# Patient Record
Sex: FEMALE | Race: WHITE | NOT HISPANIC OR LATINO | Employment: OTHER | URBAN - NONMETROPOLITAN AREA
[De-identification: names, ages, dates, MRNs, and addresses within clinical notes are randomized per-mention and may not be internally consistent; named-entity substitution may affect disease eponyms.]

---

## 2018-01-22 ENCOUNTER — OFFICE VISIT CONVERTED (OUTPATIENT)
Dept: FAMILY MEDICINE CLINIC | Age: 68
End: 2018-01-22
Attending: NURSE PRACTITIONER

## 2018-02-20 ENCOUNTER — OFFICE VISIT CONVERTED (OUTPATIENT)
Dept: FAMILY MEDICINE CLINIC | Age: 68
End: 2018-02-20
Attending: NURSE PRACTITIONER

## 2018-02-27 ENCOUNTER — OFFICE VISIT CONVERTED (OUTPATIENT)
Dept: FAMILY MEDICINE CLINIC | Age: 68
End: 2018-02-27
Attending: NURSE PRACTITIONER

## 2018-03-20 ENCOUNTER — OFFICE VISIT CONVERTED (OUTPATIENT)
Dept: FAMILY MEDICINE CLINIC | Age: 68
End: 2018-03-20
Attending: NURSE PRACTITIONER

## 2018-06-11 ENCOUNTER — OFFICE VISIT CONVERTED (OUTPATIENT)
Dept: FAMILY MEDICINE CLINIC | Age: 68
End: 2018-06-11
Attending: FAMILY MEDICINE

## 2018-09-12 ENCOUNTER — OFFICE VISIT CONVERTED (OUTPATIENT)
Dept: FAMILY MEDICINE CLINIC | Age: 68
End: 2018-09-12
Attending: NURSE PRACTITIONER

## 2018-10-01 ENCOUNTER — OFFICE VISIT CONVERTED (OUTPATIENT)
Dept: FAMILY MEDICINE CLINIC | Age: 68
End: 2018-10-01
Attending: NURSE PRACTITIONER

## 2018-10-16 ENCOUNTER — CONVERSION ENCOUNTER (OUTPATIENT)
Dept: CARDIOLOGY | Facility: CLINIC | Age: 68
End: 2018-10-16

## 2018-10-16 ENCOUNTER — OFFICE VISIT CONVERTED (OUTPATIENT)
Dept: CARDIOLOGY | Facility: CLINIC | Age: 68
End: 2018-10-16
Attending: INTERNAL MEDICINE

## 2018-11-05 ENCOUNTER — CONVERSION ENCOUNTER (OUTPATIENT)
Dept: CARDIOLOGY | Facility: CLINIC | Age: 68
End: 2018-11-05
Attending: INTERNAL MEDICINE

## 2018-12-11 ENCOUNTER — CONVERSION ENCOUNTER (OUTPATIENT)
Dept: CARDIOLOGY | Facility: CLINIC | Age: 68
End: 2018-12-11

## 2018-12-11 ENCOUNTER — OFFICE VISIT CONVERTED (OUTPATIENT)
Dept: CARDIOLOGY | Facility: CLINIC | Age: 68
End: 2018-12-11
Attending: INTERNAL MEDICINE

## 2019-03-06 ENCOUNTER — HOSPITAL ENCOUNTER (OUTPATIENT)
Dept: OTHER | Facility: HOSPITAL | Age: 69
Discharge: HOME | End: 2019-03-06
Attending: NURSE PRACTITIONER

## 2019-03-06 LAB
APPEARANCE UR: CLEAR
BACTERIA UR QL AUTO: ABNORMAL
BILIRUB UR QL: NEGATIVE
CASTS URNS QL MICRO: ABNORMAL /[LPF]
COLOR UR: YELLOW
CONV LEUKOCYTE ESTERASE: NEGATIVE
CONV UROBILINOGEN IN URINE BY AUTOMATED TEST STRIP: 0.2 {EHRLICHU}/DL (ref 0.1–1)
EPI CELLS #/AREA URNS HPF: ABNORMAL /[HPF]
ERYTHROCYTE [DISTWIDTH] IN BLOOD BY AUTOMATED COUNT: 13.3 % (ref 11.5–14.5)
GLUCOSE 24H UR-MCNC: NEGATIVE MG/DL
HBA1C MFR BLD: 15.5 G/DL (ref 12–16)
HCT VFR BLD AUTO: 47.4 % (ref 37–47)
HGB UR QL STRIP: ABNORMAL
KETONES UR QL STRIP: NEGATIVE MG/DL
MCH RBC QN AUTO: 28.4 PG (ref 27–31)
MCHC RBC AUTO-ENTMCNC: 32.7 G/DL (ref 33–37)
MCV RBC AUTO: 86.8 FL (ref 81–99)
MUCOUS THREADS URNS QL MICRO: ABNORMAL
NITRITE UR-MCNC: NEGATIVE MG/ML
PH UR STRIP.AUTO: 7 [PH] (ref 5–8)
PLATELET # BLD AUTO: 250 10*3/UL (ref 130–400)
PMV BLD AUTO: 10 FL (ref 7.4–10.4)
PROT UR-MCNC: NEGATIVE MG/DL
RBC # BLD AUTO: 5.46 10*6/UL (ref 4.2–5.4)
RBC # BLD AUTO: ABNORMAL /[HPF]
SP GR UR STRIP: 1.02 (ref 1–1.03)
SPECIMEN SOURCE: ABNORMAL
TSH SERPL-ACNC: 2.84 M[IU]/L (ref 0.27–4.2)
UNIDENT CRYS URNS QL MICRO: ABNORMAL /[HPF]
WBC # BLD AUTO: 10.79 10*3/UL (ref 4.8–10.8)
WBC #/AREA URNS HPF: ABNORMAL /[HPF]

## 2019-03-07 LAB
ALBUMIN SERPL-MCNC: 4.1 G/DL (ref 3.5–5)
ALBUMIN/GLOB SERPL: 1.4 {RATIO} (ref 1.4–2.6)
ALP SERPL-CCNC: 97 U/L (ref 43–160)
ALT SERPL-CCNC: 12 U/L (ref 10–40)
ANION GAP SERPL CALC-SCNC: 18 MMOL/L (ref 8–19)
AST SERPL-CCNC: 10 U/L (ref 15–50)
BILIRUB SERPL-MCNC: 0.22 MG/DL (ref 0.2–1.3)
BUN SERPL-MCNC: 14 MG/DL (ref 5–25)
BUN/CREAT SERPL: 19 {RATIO} (ref 6–20)
CALCIUM SERPL-MCNC: 9.6 MG/DL (ref 8.7–10.4)
CHLORIDE SERPL-SCNC: 106 MMOL/L (ref 99–111)
CHOLEST SERPL-MCNC: 179 MG/DL (ref 107–200)
CHOLEST/HDLC SERPL: 4.4 {RATIO} (ref 3–6)
CONV CO2: 26 MMOL/L (ref 22–32)
CONV CREATININE URINE, RANDOM: 89.4 MG/DL (ref 10–300)
CONV MICROALBUM.,U,RANDOM: 37.9 MG/L (ref 0–20)
CONV TOTAL PROTEIN: 7 G/DL (ref 6.3–8.2)
CREAT UR-MCNC: 0.74 MG/DL (ref 0.5–0.9)
EST. AVERAGE GLUCOSE BLD GHB EST-MCNC: 160 MG/DL
GFR SERPLBLD BASED ON 1.73 SQ M-ARVRAT: >60 ML/MIN/{1.73_M2}
GLOBULIN UR ELPH-MCNC: 2.9 G/DL (ref 2–3.5)
GLUCOSE SERPL-MCNC: 127 MG/DL (ref 65–99)
HBA1C MFR BLD: 7.2 % (ref 3.5–5.7)
HDLC SERPL-MCNC: 41 MG/DL (ref 40–60)
LDLC SERPL CALC-MCNC: 101 MG/DL (ref 70–100)
MICROALBUMIN/CREAT UR: 42.4 MG/G{CRE} (ref 0–35)
OSMOLALITY SERPL CALC.SUM OF ELEC: 302 MOSM/KG (ref 273–304)
POTASSIUM SERPL-SCNC: 4.6 MMOL/L (ref 3.5–5.3)
SODIUM SERPL-SCNC: 145 MMOL/L (ref 135–147)
TRIGL SERPL-MCNC: 187 MG/DL (ref 40–150)
VLDLC SERPL-MCNC: 37 MG/DL (ref 5–37)

## 2019-03-08 ENCOUNTER — OFFICE VISIT CONVERTED (OUTPATIENT)
Dept: FAMILY MEDICINE CLINIC | Age: 69
End: 2019-03-08
Attending: NURSE PRACTITIONER

## 2019-04-01 ENCOUNTER — HOSPITAL ENCOUNTER (OUTPATIENT)
Dept: OTHER | Facility: HOSPITAL | Age: 69
Discharge: HOME | End: 2019-04-01
Attending: NURSE PRACTITIONER

## 2019-04-01 ENCOUNTER — OFFICE VISIT CONVERTED (OUTPATIENT)
Dept: FAMILY MEDICINE CLINIC | Age: 69
End: 2019-04-01
Attending: NURSE PRACTITIONER

## 2019-04-01 LAB
APPEARANCE UR: CLEAR
BACTERIA UR QL AUTO: ABNORMAL
BILIRUB UR QL: NEGATIVE
CASTS URNS QL MICRO: ABNORMAL /[LPF]
COLOR UR: YELLOW
CONV LEUKOCYTE ESTERASE: NEGATIVE
CONV UROBILINOGEN IN URINE BY AUTOMATED TEST STRIP: 0.2 {EHRLICHU}/DL (ref 0.1–1)
EPI CELLS #/AREA URNS HPF: ABNORMAL /[HPF]
GLUCOSE 24H UR-MCNC: NEGATIVE MG/DL
HGB UR QL STRIP: ABNORMAL
KETONES UR QL STRIP: NEGATIVE MG/DL
MUCOUS THREADS URNS QL MICRO: ABNORMAL
NITRITE UR-MCNC: NEGATIVE MG/ML
PH UR STRIP.AUTO: 7 [PH] (ref 5–8)
PROT UR-MCNC: NEGATIVE MG/DL
RBC # BLD AUTO: ABNORMAL /[HPF]
SP GR UR STRIP: 1.02 (ref 1–1.03)
SPECIMEN SOURCE: ABNORMAL
UNIDENT CRYS URNS QL MICRO: ABNORMAL /[HPF]
WBC #/AREA URNS HPF: ABNORMAL /[HPF]

## 2019-04-02 ENCOUNTER — OFFICE VISIT CONVERTED (OUTPATIENT)
Dept: CARDIOLOGY | Facility: CLINIC | Age: 69
End: 2019-04-02
Attending: INTERNAL MEDICINE

## 2019-04-03 LAB — BACTERIA UR CULT: NORMAL

## 2019-04-30 ENCOUNTER — OFFICE VISIT CONVERTED (OUTPATIENT)
Dept: FAMILY MEDICINE CLINIC | Age: 69
End: 2019-04-30
Attending: NURSE PRACTITIONER

## 2019-05-29 ENCOUNTER — OFFICE VISIT CONVERTED (OUTPATIENT)
Dept: FAMILY MEDICINE CLINIC | Age: 69
End: 2019-05-29
Attending: NURSE PRACTITIONER

## 2019-10-15 ENCOUNTER — OFFICE VISIT CONVERTED (OUTPATIENT)
Dept: CARDIOLOGY | Facility: CLINIC | Age: 69
End: 2019-10-15
Attending: INTERNAL MEDICINE

## 2020-03-10 ENCOUNTER — HOSPITAL ENCOUNTER (OUTPATIENT)
Dept: OTHER | Facility: HOSPITAL | Age: 70
Discharge: HOME | End: 2020-03-10
Attending: NURSE PRACTITIONER

## 2020-03-10 ENCOUNTER — OFFICE VISIT CONVERTED (OUTPATIENT)
Dept: FAMILY MEDICINE CLINIC | Age: 70
End: 2020-03-10
Attending: NURSE PRACTITIONER

## 2020-03-10 LAB
ALBUMIN SERPL-MCNC: 4 G/DL (ref 3.5–5)
ALBUMIN/GLOB SERPL: 1.4 {RATIO} (ref 1.4–2.6)
ALP SERPL-CCNC: 99 U/L (ref 43–160)
ALT SERPL-CCNC: 15 U/L (ref 10–40)
ANION GAP SERPL CALC-SCNC: 20 MMOL/L (ref 8–19)
APPEARANCE UR: CLEAR
AST SERPL-CCNC: 12 U/L (ref 15–50)
BACTERIA UR QL AUTO: ABNORMAL
BILIRUB SERPL-MCNC: 0.35 MG/DL (ref 0.2–1.3)
BILIRUB UR QL: NEGATIVE
BUN SERPL-MCNC: 13 MG/DL (ref 5–25)
BUN/CREAT SERPL: 18 {RATIO} (ref 6–20)
CALCIUM SERPL-MCNC: 9.5 MG/DL (ref 8.7–10.4)
CASTS URNS QL MICRO: ABNORMAL /[LPF]
CHLORIDE SERPL-SCNC: 97 MMOL/L (ref 99–111)
CHOLEST SERPL-MCNC: 213 MG/DL (ref 107–200)
CHOLEST/HDLC SERPL: 5.5 {RATIO} (ref 3–6)
COLOR UR: YELLOW
CONV CO2: 24 MMOL/L (ref 22–32)
CONV CREATININE URINE, RANDOM: 82 MG/DL (ref 10–300)
CONV LEUKOCYTE ESTERASE: NEGATIVE
CONV MICROALBUM.,U,RANDOM: 17.3 MG/L (ref 0–20)
CONV TOTAL PROTEIN: 6.8 G/DL (ref 6.3–8.2)
CONV UROBILINOGEN IN URINE BY AUTOMATED TEST STRIP: 0.2 {EHRLICHU}/DL (ref 0.1–1)
CREAT UR-MCNC: 0.73 MG/DL (ref 0.5–0.9)
EPI CELLS #/AREA URNS HPF: ABNORMAL /[HPF]
EST. AVERAGE GLUCOSE BLD GHB EST-MCNC: 309 MG/DL
GFR SERPLBLD BASED ON 1.73 SQ M-ARVRAT: >60 ML/MIN/{1.73_M2}
GLOBULIN UR ELPH-MCNC: 2.8 G/DL (ref 2–3.5)
GLUCOSE 24H UR-MCNC: 500 MG/DL
GLUCOSE SERPL-MCNC: 349 MG/DL (ref 65–99)
HBA1C MFR BLD: 12.4 % (ref 3.5–5.7)
HDLC SERPL-MCNC: 39 MG/DL (ref 40–60)
HGB UR QL STRIP: ABNORMAL
KETONES UR QL STRIP: NEGATIVE MG/DL
LDLC SERPL CALC-MCNC: 109 MG/DL (ref 70–100)
MICROALBUMIN/CREAT UR: 21.1 MG/G{CRE} (ref 0–35)
MUCOUS THREADS URNS QL MICRO: ABNORMAL
NITRITE UR-MCNC: NEGATIVE MG/ML
OSMOLALITY SERPL CALC.SUM OF ELEC: 298 MOSM/KG (ref 273–304)
PH UR STRIP.AUTO: 5.5 [PH] (ref 5–8)
POTASSIUM SERPL-SCNC: 3.9 MMOL/L (ref 3.5–5.3)
PROT UR-MCNC: NEGATIVE MG/DL
RBC # BLD AUTO: ABNORMAL /[HPF]
SODIUM SERPL-SCNC: 137 MMOL/L (ref 135–147)
SP GR UR STRIP: 1.02 (ref 1–1.03)
SPECIMEN SOURCE: ABNORMAL
TRIGL SERPL-MCNC: 323 MG/DL (ref 40–150)
UNIDENT CRYS URNS QL MICRO: ABNORMAL /[HPF]
VLDLC SERPL-MCNC: 65 MG/DL (ref 5–37)
WBC #/AREA URNS HPF: ABNORMAL /[HPF]

## 2020-03-24 ENCOUNTER — OFFICE VISIT CONVERTED (OUTPATIENT)
Dept: FAMILY MEDICINE CLINIC | Age: 70
End: 2020-03-24
Attending: NURSE PRACTITIONER

## 2020-04-27 ENCOUNTER — OFFICE VISIT CONVERTED (OUTPATIENT)
Dept: FAMILY MEDICINE CLINIC | Age: 70
End: 2020-04-27
Attending: NURSE PRACTITIONER

## 2020-07-08 ENCOUNTER — HOSPITAL ENCOUNTER (OUTPATIENT)
Dept: OTHER | Facility: HOSPITAL | Age: 70
Discharge: HOME | End: 2020-07-08
Attending: NURSE PRACTITIONER

## 2020-07-08 LAB
ALBUMIN SERPL-MCNC: 3.9 G/DL (ref 3.5–5)
ALBUMIN/GLOB SERPL: 1.4 {RATIO} (ref 1.4–2.6)
ALP SERPL-CCNC: 101 U/L (ref 43–160)
ALT SERPL-CCNC: 15 U/L (ref 10–40)
ANION GAP SERPL CALC-SCNC: 17 MMOL/L (ref 8–19)
APPEARANCE UR: CLEAR
AST SERPL-CCNC: 18 U/L (ref 15–50)
BACTERIA UR QL AUTO: ABNORMAL
BILIRUB SERPL-MCNC: 0.49 MG/DL (ref 0.2–1.3)
BILIRUB UR QL: ABNORMAL
BUN SERPL-MCNC: 17 MG/DL (ref 5–25)
BUN/CREAT SERPL: 21 {RATIO} (ref 6–20)
CALCIUM SERPL-MCNC: 9.2 MG/DL (ref 8.7–10.4)
CASTS URNS QL MICRO: ABNORMAL /[LPF]
CHLORIDE SERPL-SCNC: 98 MMOL/L (ref 99–111)
CHOLEST SERPL-MCNC: 134 MG/DL (ref 107–200)
CHOLEST/HDLC SERPL: 4.1 {RATIO} (ref 3–6)
COLOR UR: YELLOW
CONV CO2: 23 MMOL/L (ref 22–32)
CONV LEUKOCYTE ESTERASE: NEGATIVE
CONV TOTAL PROTEIN: 6.6 G/DL (ref 6.3–8.2)
CONV UROBILINOGEN IN URINE BY AUTOMATED TEST STRIP: 0.2 {EHRLICHU}/DL (ref 0.1–1)
CREAT UR-MCNC: 0.81 MG/DL (ref 0.5–0.9)
EPI CELLS #/AREA URNS HPF: ABNORMAL /[HPF]
EST. AVERAGE GLUCOSE BLD GHB EST-MCNC: 280 MG/DL
GFR SERPLBLD BASED ON 1.73 SQ M-ARVRAT: >60 ML/MIN/{1.73_M2}
GLOBULIN UR ELPH-MCNC: 2.7 G/DL (ref 2–3.5)
GLUCOSE 24H UR-MCNC: 500 MG/DL
GLUCOSE SERPL-MCNC: 284 MG/DL (ref 65–99)
HBA1C MFR BLD: 11.4 % (ref 3.5–5.7)
HDLC SERPL-MCNC: 33 MG/DL (ref 40–60)
HGB UR QL STRIP: ABNORMAL
KETONES UR QL STRIP: NEGATIVE MG/DL
LDLC SERPL CALC-MCNC: 50 MG/DL (ref 70–100)
MUCOUS THREADS URNS QL MICRO: ABNORMAL
NITRITE UR-MCNC: NEGATIVE MG/ML
OSMOLALITY SERPL CALC.SUM OF ELEC: 290 MOSM/KG (ref 273–304)
PH UR STRIP.AUTO: 5.5 [PH] (ref 5–8)
POTASSIUM SERPL-SCNC: 3.8 MMOL/L (ref 3.5–5.3)
PROT UR-MCNC: NEGATIVE MG/DL
RBC # BLD AUTO: ABNORMAL /[HPF]
SODIUM SERPL-SCNC: 134 MMOL/L (ref 135–147)
SP GR UR STRIP: 1.02 (ref 1–1.03)
SPECIMEN SOURCE: ABNORMAL
TRIGL SERPL-MCNC: 257 MG/DL (ref 40–150)
UNIDENT CRYS URNS QL MICRO: ABNORMAL /[HPF]
VLDLC SERPL-MCNC: 51 MG/DL (ref 5–37)
WBC #/AREA URNS HPF: ABNORMAL /[HPF]

## 2020-09-09 ENCOUNTER — OFFICE VISIT CONVERTED (OUTPATIENT)
Dept: CARDIOLOGY | Facility: CLINIC | Age: 70
End: 2020-09-09
Attending: INTERNAL MEDICINE

## 2020-09-30 ENCOUNTER — OFFICE VISIT CONVERTED (OUTPATIENT)
Dept: FAMILY MEDICINE CLINIC | Age: 70
End: 2020-09-30
Attending: NURSE PRACTITIONER

## 2020-09-30 ENCOUNTER — HOSPITAL ENCOUNTER (OUTPATIENT)
Dept: OTHER | Facility: HOSPITAL | Age: 70
Discharge: HOME | End: 2020-09-30
Attending: NURSE PRACTITIONER

## 2020-09-30 LAB
ALBUMIN SERPL-MCNC: 4.3 G/DL (ref 3.5–5)
ALBUMIN/GLOB SERPL: 1.4 {RATIO} (ref 1.4–2.6)
ALP SERPL-CCNC: 104 U/L (ref 43–160)
ALT SERPL-CCNC: 13 U/L (ref 10–40)
ANION GAP SERPL CALC-SCNC: 17 MMOL/L (ref 8–19)
APPEARANCE UR: ABNORMAL
AST SERPL-CCNC: 11 U/L (ref 15–50)
BACTERIA UR QL AUTO: ABNORMAL
BILIRUB SERPL-MCNC: 0.44 MG/DL (ref 0.2–1.3)
BILIRUB UR QL: ABNORMAL
BUN SERPL-MCNC: 17 MG/DL (ref 5–25)
BUN/CREAT SERPL: 20 {RATIO} (ref 6–20)
CALCIUM SERPL-MCNC: 9.6 MG/DL (ref 8.7–10.4)
CASTS URNS QL MICRO: ABNORMAL /[LPF]
CHLORIDE SERPL-SCNC: 95 MMOL/L (ref 99–111)
COLOR UR: ABNORMAL
CONV CO2: 28 MMOL/L (ref 22–32)
CONV LEUKOCYTE ESTERASE: NEGATIVE
CONV TOTAL PROTEIN: 7.3 G/DL (ref 6.3–8.2)
CONV UROBILINOGEN IN URINE BY AUTOMATED TEST STRIP: 0.2 {EHRLICHU}/DL (ref 0.1–1)
CREAT UR-MCNC: 0.83 MG/DL (ref 0.5–0.9)
EPI CELLS #/AREA URNS HPF: ABNORMAL /[HPF]
ERYTHROCYTE [DISTWIDTH] IN BLOOD BY AUTOMATED COUNT: 13.3 % (ref 11.7–14.4)
GFR SERPLBLD BASED ON 1.73 SQ M-ARVRAT: >60 ML/MIN/{1.73_M2}
GLOBULIN UR ELPH-MCNC: 3 G/DL (ref 2–3.5)
GLUCOSE 24H UR-MCNC: NEGATIVE MG/DL
GLUCOSE SERPL-MCNC: 216 MG/DL (ref 65–99)
HCT VFR BLD AUTO: 49.8 % (ref 37–47)
HGB BLD-MCNC: 16.4 G/DL (ref 12–16)
HGB UR QL STRIP: ABNORMAL
KETONES UR QL STRIP: NEGATIVE MG/DL
MCH RBC QN AUTO: 28.3 PG (ref 27–31)
MCHC RBC AUTO-ENTMCNC: 32.9 G/DL (ref 33–37)
MCV RBC AUTO: 86 FL (ref 81–99)
MUCOUS THREADS URNS QL MICRO: ABNORMAL
NITRITE UR-MCNC: NEGATIVE MG/ML
OSMOLALITY SERPL CALC.SUM OF ELEC: 290 MOSM/KG (ref 273–304)
PH UR STRIP.AUTO: 5.5 [PH] (ref 5–8)
PLATELET # BLD AUTO: 228 10*3/UL (ref 130–400)
PMV BLD AUTO: 11.9 FL (ref 9.4–12.3)
POTASSIUM SERPL-SCNC: 3.7 MMOL/L (ref 3.5–5.3)
PROT UR-MCNC: 15 MG/DL
RBC # BLD AUTO: 5.79 10*6/UL (ref 4.2–5.4)
RBC # BLD AUTO: ABNORMAL /[HPF]
SODIUM SERPL-SCNC: 136 MMOL/L (ref 135–147)
SP GR UR STRIP: >=1.03 (ref 1–1.03)
SPECIMEN SOURCE: ABNORMAL
UNIDENT CRYS URNS QL MICRO: ABNORMAL /[HPF]
WBC # BLD AUTO: 12.34 10*3/UL (ref 4.8–10.8)
WBC #/AREA URNS HPF: ABNORMAL /[HPF]

## 2020-10-02 LAB — BACTERIA UR CULT: NORMAL

## 2020-11-03 ENCOUNTER — HOSPITAL ENCOUNTER (OUTPATIENT)
Dept: OTHER | Facility: HOSPITAL | Age: 70
Discharge: HOME | End: 2020-11-03
Attending: NURSE PRACTITIONER

## 2020-12-01 ENCOUNTER — OFFICE VISIT CONVERTED (OUTPATIENT)
Dept: FAMILY MEDICINE CLINIC | Age: 70
End: 2020-12-01
Attending: NURSE PRACTITIONER

## 2020-12-01 ENCOUNTER — HOSPITAL ENCOUNTER (OUTPATIENT)
Dept: OTHER | Facility: HOSPITAL | Age: 70
Discharge: HOME | End: 2020-12-01
Attending: NURSE PRACTITIONER

## 2020-12-01 LAB
ALBUMIN SERPL-MCNC: 4.1 G/DL (ref 3.5–5)
ALBUMIN/GLOB SERPL: 1.3 {RATIO} (ref 1.4–2.6)
ALP SERPL-CCNC: 107 U/L (ref 43–160)
ALT SERPL-CCNC: 10 U/L (ref 10–40)
ANION GAP SERPL CALC-SCNC: 18 MMOL/L (ref 8–19)
APPEARANCE UR: CLEAR
AST SERPL-CCNC: 12 U/L (ref 15–50)
BACTERIA UR QL AUTO: ABNORMAL
BILIRUB SERPL-MCNC: 0.34 MG/DL (ref 0.2–1.3)
BILIRUB UR QL: NEGATIVE
BUN SERPL-MCNC: 19 MG/DL (ref 5–25)
BUN/CREAT SERPL: 23 {RATIO} (ref 6–20)
CALCIUM SERPL-MCNC: 9.8 MG/DL (ref 8.7–10.4)
CASTS URNS QL MICRO: ABNORMAL /[LPF]
CHLORIDE SERPL-SCNC: 98 MMOL/L (ref 99–111)
CHOLEST SERPL-MCNC: 140 MG/DL (ref 107–200)
CHOLEST/HDLC SERPL: 3.5 {RATIO} (ref 3–6)
COLOR UR: YELLOW
CONV CO2: 24 MMOL/L (ref 22–32)
CONV CREATININE URINE, RANDOM: 102.7 MG/DL (ref 10–300)
CONV LEUKOCYTE ESTERASE: NEGATIVE
CONV MICROALBUM.,U,RANDOM: <12 MG/L (ref 0–20)
CONV TOTAL PROTEIN: 7.2 G/DL (ref 6.3–8.2)
CONV UROBILINOGEN IN URINE BY AUTOMATED TEST STRIP: 0.2 {EHRLICHU}/DL (ref 0.1–1)
CREAT UR-MCNC: 0.82 MG/DL (ref 0.5–0.9)
EPI CELLS #/AREA URNS HPF: ABNORMAL /[HPF]
EST. AVERAGE GLUCOSE BLD GHB EST-MCNC: 223 MG/DL
GFR SERPLBLD BASED ON 1.73 SQ M-ARVRAT: >60 ML/MIN/{1.73_M2}
GLOBULIN UR ELPH-MCNC: 3.1 G/DL (ref 2–3.5)
GLUCOSE 24H UR-MCNC: NEGATIVE MG/DL
GLUCOSE SERPL-MCNC: 209 MG/DL (ref 65–99)
HBA1C MFR BLD: 9.4 % (ref 3.5–5.7)
HDLC SERPL-MCNC: 40 MG/DL (ref 40–60)
HGB UR QL STRIP: ABNORMAL
KETONES UR QL STRIP: NEGATIVE MG/DL
LDLC SERPL CALC-MCNC: 67 MG/DL (ref 70–100)
MICROALBUMIN/CREAT UR: 11.7 MG/G{CRE} (ref 0–35)
MUCOUS THREADS URNS QL MICRO: ABNORMAL
NITRITE UR-MCNC: NEGATIVE MG/ML
OSMOLALITY SERPL CALC.SUM OF ELEC: 290 MOSM/KG (ref 273–304)
PH UR STRIP.AUTO: 6 [PH] (ref 5–8)
POTASSIUM SERPL-SCNC: 3.9 MMOL/L (ref 3.5–5.3)
PROT UR-MCNC: NEGATIVE MG/DL
RBC # BLD AUTO: ABNORMAL /[HPF]
SODIUM SERPL-SCNC: 136 MMOL/L (ref 135–147)
SP GR UR STRIP: >=1.03 (ref 1–1.03)
SPECIMEN SOURCE: ABNORMAL
TRIGL SERPL-MCNC: 167 MG/DL (ref 40–150)
UNIDENT CRYS URNS QL MICRO: ABNORMAL /[HPF]
VLDLC SERPL-MCNC: 33 MG/DL (ref 5–37)
WBC #/AREA URNS HPF: ABNORMAL /[HPF]

## 2021-03-12 ENCOUNTER — OFFICE VISIT CONVERTED (OUTPATIENT)
Dept: FAMILY MEDICINE CLINIC | Age: 71
End: 2021-03-12
Attending: NURSE PRACTITIONER

## 2021-03-12 ENCOUNTER — HOSPITAL ENCOUNTER (OUTPATIENT)
Dept: OTHER | Facility: HOSPITAL | Age: 71
Discharge: HOME | End: 2021-03-12
Attending: NURSE PRACTITIONER

## 2021-03-12 LAB
ALBUMIN SERPL-MCNC: 4.2 G/DL (ref 3.5–5)
ALBUMIN/GLOB SERPL: 1.8 {RATIO} (ref 1.4–2.6)
ALP SERPL-CCNC: 88 U/L (ref 43–160)
ALT SERPL-CCNC: 9 U/L (ref 10–40)
ANION GAP SERPL CALC-SCNC: 16 MMOL/L (ref 8–19)
APPEARANCE UR: CLEAR
AST SERPL-CCNC: 11 U/L (ref 15–50)
BACTERIA UR QL AUTO: ABNORMAL
BILIRUB SERPL-MCNC: 0.54 MG/DL (ref 0.2–1.3)
BILIRUB UR QL: NEGATIVE
BUN SERPL-MCNC: 19 MG/DL (ref 5–25)
BUN/CREAT SERPL: 26 {RATIO} (ref 6–20)
CALCIUM SERPL-MCNC: 9.5 MG/DL (ref 8.7–10.4)
CASTS URNS QL MICRO: ABNORMAL /[LPF]
CHLORIDE SERPL-SCNC: 98 MMOL/L (ref 99–111)
CHOLEST SERPL-MCNC: 128 MG/DL (ref 107–200)
CHOLEST/HDLC SERPL: 3.2 {RATIO} (ref 3–6)
COLOR UR: YELLOW
CONV CO2: 27 MMOL/L (ref 22–32)
CONV CREATININE URINE, RANDOM: 145.1 MG/DL (ref 10–300)
CONV LEUKOCYTE ESTERASE: NEGATIVE
CONV MICROALBUM.,U,RANDOM: <12 MG/L (ref 0–20)
CONV TOTAL PROTEIN: 6.6 G/DL (ref 6.3–8.2)
CONV UROBILINOGEN IN URINE BY AUTOMATED TEST STRIP: 0.2 {EHRLICHU}/DL (ref 0.1–1)
CREAT UR-MCNC: 0.73 MG/DL (ref 0.5–0.9)
EPI CELLS #/AREA URNS HPF: ABNORMAL /[HPF]
ERYTHROCYTE [DISTWIDTH] IN BLOOD BY AUTOMATED COUNT: 13.3 % (ref 11.5–14.5)
EST. AVERAGE GLUCOSE BLD GHB EST-MCNC: 166 MG/DL
GFR SERPLBLD BASED ON 1.73 SQ M-ARVRAT: >60 ML/MIN/{1.73_M2}
GLOBULIN UR ELPH-MCNC: 2.4 G/DL (ref 2–3.5)
GLUCOSE 24H UR-MCNC: NEGATIVE MG/DL
GLUCOSE SERPL-MCNC: 149 MG/DL (ref 65–99)
HBA1C MFR BLD: 15.8 G/DL (ref 12–16)
HBA1C MFR BLD: 7.4 % (ref 3.5–5.7)
HCT VFR BLD AUTO: 47.8 % (ref 37–47)
HDLC SERPL-MCNC: 40 MG/DL (ref 40–60)
HGB UR QL STRIP: ABNORMAL
KETONES UR QL STRIP: NEGATIVE MG/DL
LDLC SERPL CALC-MCNC: 53 MG/DL (ref 70–100)
MCH RBC QN AUTO: 28.2 PG (ref 27–31)
MCHC RBC AUTO-ENTMCNC: 33.1 G/DL (ref 33–37)
MCV RBC AUTO: 85.4 FL (ref 81–99)
MICROALBUMIN/CREAT UR: 8.3 MG/G{CRE} (ref 0–35)
MUCOUS THREADS URNS QL MICRO: ABNORMAL
NITRITE UR-MCNC: NEGATIVE MG/ML
OSMOLALITY SERPL CALC.SUM OF ELEC: 289 MOSM/KG (ref 273–304)
PH UR STRIP.AUTO: 5.5 [PH] (ref 5–8)
PLATELET # BLD AUTO: 276 10*3/UL (ref 130–400)
PMV BLD AUTO: 9.8 FL (ref 7.4–10.4)
POTASSIUM SERPL-SCNC: 3.9 MMOL/L (ref 3.5–5.3)
PROT UR-MCNC: NEGATIVE MG/DL
RBC # BLD AUTO: 5.6 10*6/UL (ref 4.2–5.4)
RBC # BLD AUTO: ABNORMAL /[HPF]
SODIUM SERPL-SCNC: 137 MMOL/L (ref 135–147)
SP GR UR STRIP: 1.02 (ref 1–1.03)
SPECIMEN SOURCE: ABNORMAL
TRIGL SERPL-MCNC: 174 MG/DL (ref 40–150)
TSH SERPL-ACNC: 2.82 M[IU]/L (ref 0.27–4.2)
UNIDENT CRYS URNS QL MICRO: ABNORMAL /[HPF]
VLDLC SERPL-MCNC: 35 MG/DL (ref 5–37)
WBC # BLD AUTO: 12.95 10*3/UL (ref 4.8–10.8)
WBC #/AREA URNS HPF: ABNORMAL /[HPF]

## 2021-04-23 ENCOUNTER — OFFICE VISIT CONVERTED (OUTPATIENT)
Dept: FAMILY MEDICINE CLINIC | Age: 71
End: 2021-04-23
Attending: NURSE PRACTITIONER

## 2021-05-13 NOTE — PROGRESS NOTES
Progress Note      Patient Name: Zaria Howard   Patient ID: [id number]   Sex: Female   YOB: 1950    Primary Care Provider: Alex BERNAL   Referring Provider: Alex BERNAL    Visit Date: September 9, 2020    Provider: Jose Elias Calderon MD   Location: [facility]   Location Address: [address] [place] [address]  [address] [place] [address]  [id number]   Location Phone: [phone]          Chief Complaint  · Follow-up visit for chest pain and hypertension       History Of Present Illness  REFERRING CARE PROVIDER: Alex BERNAL   Zaria Howard is a 70-year-old female with hypertension, who was previously seen and evaluated for chest pain. Subsequent SPECT study showed a small apical infarct, makenna-infarct ischemia. Cardiac catheterization was recommended, but patient opted for medical management. Isosorbide Mononitrate and Metoprolol were added. Today the patient reports feeling fine. She denies having any chest pain episodes in the past one year. She denies having any shortness of breath, palpitations or dizziness. She was diagnosed with diabetes mellitus recently and was started on Metformin. Thyroid hormone supplementation was also started.   PAST MEDICAL HISTORY: 1) Hypertension; 2) Chest pain with a stress test showing small apical ischemia, opted for medical management; 3) Diabetes mellitus, on oral agents; 4) Hypothyroidism.   PSYCHOSOCIAL HISTORY: No history of mood changes or depression. She never used alcohol. She smokes 1 pack of cigarettes per day.   CURRENT MEDICATIONS: include ASA 81 mg daily; Metoprolol Succinate 25 mg daily; Isosorbide 30 mg daily; HCTZ 25 mg daily; Metformin 500 mg b.i.d.; Xyzal p.r.n.. The dosage and frequency of the medications were reviewed with the patient.       Review of Systems  · Cardiovascular  o Admits  o : swelling (feet, ankles, hands)  o Denies  o : palpitations (fast, fluttering, or skipping beats), shortness of breath  "while walking or lying flat, chest pain or angina pectoris   · Respiratory  o Denies  o : chronic or frequent cough, asthma or wheezing      Vitals  Date Time BP Position Site L\R Cuff Size HR RR TEMP (F) WT  HT  BMI kg/m2 BSA m2 O2 Sat HC       09/09/2020 09:29 /89 Sitting    85 - R   159lbs 0oz 5'  3\" 28.17 1.79           Physical Examination  · Respiratory  o Auscultation of Lungs  o : Clear to auscultation bilaterally. No crackles or rhonchi.  · Cardiovascular  o Heart  o : S1, S2 is normally heard. No S3. No murmur, rubs, or gallops.  · Gastrointestinal  o Abdominal Examination  o : Soft, nontender, nondistended. No free fluid. Bowel sounds heard in all four quadrants.  · Extremities  o Extremities  o : Warm and well perfused. No pitting pedal edema. Distal pulses present.     Labs done on 07/08/2020 show sodium 134, potassium 3.8, bicarb 23, BUN 17, creatinine 0.81.  Hemoglobin A1c is 11.4.  Calcium 9.2, bilirubin 0.49.  AST 18, ALT 15.  Alkaline phosphatase is 101.  Total protein 6.6, albumin 3.9, globulin 2.7.  , , LDL 50, HDL 33.           Assessment     ASSESSMENT AND PLAN:    1.  Hypertension:  Blood pressure very well controlled.  Continue Metoprolol and HCTZ at the current dose.  2.  Chest pain:  Currently asymptomatic and no chest pain spells for the past one year.  Left ventricular function       is preserved.  No further cardiac workup is needed at this point.  Continue Isosorbide Mononitrate and       aspirin at the current dose.  3.  Follow up in 9 months.  Patient was advised to call our office earlier for any worsening symptoms, especially       chest tightness or pain.    MD KIKE Zuniga/chente           This note was transcribed by Payton Mosquera.  chente/KIKE  The above service was transcribed by Pyaton Mosquera, and I attest to the accuracy of the note.  JV               Electronically Signed by: Payton Mosquera-, -Author on September 14, 2020 08:14:28 " AM  Electronically Co-signed by: Jose Elias Calderon MD -Reviewer on September 14, 2020 08:15:13 AM

## 2021-05-14 VITALS
SYSTOLIC BLOOD PRESSURE: 137 MMHG | DIASTOLIC BLOOD PRESSURE: 89 MMHG | HEART RATE: 85 BPM | WEIGHT: 159 LBS | HEIGHT: 63 IN | BODY MASS INDEX: 28.17 KG/M2

## 2021-05-15 VITALS
HEART RATE: 73 BPM | DIASTOLIC BLOOD PRESSURE: 63 MMHG | BODY MASS INDEX: 30.65 KG/M2 | HEIGHT: 63 IN | SYSTOLIC BLOOD PRESSURE: 147 MMHG | WEIGHT: 173 LBS

## 2021-05-15 VITALS
SYSTOLIC BLOOD PRESSURE: 143 MMHG | BODY MASS INDEX: 31.89 KG/M2 | DIASTOLIC BLOOD PRESSURE: 67 MMHG | HEIGHT: 63 IN | HEART RATE: 69 BPM | WEIGHT: 180 LBS

## 2021-05-15 VITALS
DIASTOLIC BLOOD PRESSURE: 71 MMHG | HEIGHT: 63 IN | WEIGHT: 180 LBS | SYSTOLIC BLOOD PRESSURE: 153 MMHG | BODY MASS INDEX: 31.89 KG/M2 | HEART RATE: 86 BPM

## 2021-05-16 VITALS
WEIGHT: 173 LBS | DIASTOLIC BLOOD PRESSURE: 60 MMHG | SYSTOLIC BLOOD PRESSURE: 146 MMHG | HEIGHT: 63 IN | HEART RATE: 66 BPM | BODY MASS INDEX: 30.65 KG/M2

## 2021-05-18 NOTE — PROGRESS NOTES
Zaria HowardBecca 1950     Office/Outpatient Visit    Visit Date: Tue, Feb 27, 2018 08:44 am    Provider: Alex Girard N.P. (Assistant: Kajal Hoover MA)    Location: [facility]        Electronically signed by Alex Girard N.P. on  02/27/2018 05:33:37 PM                             SUBJECTIVE:        CC:     DEANNE is a 67 year old White female.  DISCUSS LABS;         HPI:         Patient presents with type 2 diabetes.  It was first diagnosed 1 week ago.  This diagnosis was made during an office visit with another health care provider.  Treatment started at that time includes an 1800 calorie ADA diet.  Diabetes education was initiated and has included handouts and oral instruction from a doctor (regarding a definition of the disease, diabetic diet, treatments, and glycosolated hemoglobin measurement ).  Initial lab results include Estimated Avg Glucose:  154 (mg/dL) (02/20/2018), Hemoglobin A1c:  7.0 (%) (02/20/2018).  She denies experiencing any diabetes related symptoms.  Pertinent medical history includes obesity.      ROS:     CONSTITUTIONAL:  Negative for chills, fatigue, fever, and weight change.      CARDIOVASCULAR:  Negative for chest pain, orthopnea, paroxysmal nocturnal dyspnea and pedal edema.      RESPIRATORY:  Positive for current smoker, gets patches filled on Friday and is determined to quit smoking.   Negative for dyspnea.      GASTROINTESTINAL:  Negative for abdominal pain, constipation, diarrhea, nausea and vomiting.      ENDOCRINE:  Positive for New dx of DM, A1C 7.0, declines meds, wants to try 3mo of diet.      PSYCHIATRIC:  Positive for improved with trazodone.   Negative for anxiety or depression.          PMH/FMH/SH:     Last Reviewed on 2/27/2018 11:20 AM by Alex Girard    Past Medical History:             PREVENTIVE HEALTH MAINTENANCE             BONE DENSITY: has never been done will think about it in the future     COLORECTAL CANCER SCREENING:  declines colorectal cancer screening, understands reason for testing; never     MAMMOGRAM: declines, understands reason for testing has never been done     PAP SMEAR: was last done S/P OFE with BSO 2016 No longer indicated due to age and history         PAST MEDICAL HISTORY             ADVANCE DIRECTIVE Living will and Durable Power of  [facility]         Family History:     Father: Cerebrovascular Accident ( death in his fifties )     Mother: ;  Myocardial Infarction ( 70's )     Son(s): at [facility], is to be placed in nursing home         Social History:     Occupation:    . Retired     Marital Status:      Children: 3 children (ages 3 living 1 passed away )         Tobacco/Alcohol/Supplements:     Last Reviewed on 2018 11:20 AM by Alex Girard    Tobacco: Current Smoker: She currently smokes every day, 1/2 pack per day; (start age 31, smoked 2-3 ppd for many years pack-year history).          Alcohol:  Does not drink alcohol and never has.      Caffeine:  She admits to consuming caffeine via coffee ( 2 servings per day ).          Substance Abuse History:     Last Reviewed on 2018 11:20 AM by Alex Girard        Mental Health History:     Last Reviewed on 2018 11:20 AM by Alex Girard        Communicable Diseases (eg STDs):     Last Reviewed on 2018 11:20 AM by Alex Girard            Current Problems:     Last Reviewed on 2018 11:20 AM by Alex Girard    Type 2 diabetes     Dysthymic disorder     Hypertension     Cigarette smoking     Screening for depression     Insomnia     ETD     R otitis media     Pneumonia, NEC     Acute bronchitis         Immunizations:     None        Allergies:     Last Reviewed on 2018 11:20 AM by Alex Girard    Sudafed:        Current Medications:     Last Reviewed on 2018 11:20 AM by Alex Girard    [facility]     Albuterol  90mcg/1actuation Oral Inhaler 2 puff  QID  PRN     Fluticasone Propionate 50mcg/1actuation Nasal Spray 1 spray each nostil daily     Nicotine 14mg Transdermal Patch Apply 1 patch to hairless area on upper arm once daily, change every 24 hours for 6 weeks.     Nicotine 21mg Transdermal Patch Apply 1 patch(es) to hairless area on upper arms daily Remove after 24 hours for 6 weeks     Nicotine 7mg Transdermal Patch apply 1 patch to hairless area on under arms daily. Remove after 24 hours for 2 weeks.     Zyrtec 10mg Tablet Take 1 tablet(s) by mouth daily         OBJECTIVE:        Vitals:         Current: 2/27/2018 8:46:11 AM    Ht:  5 ft, 2.75 in;  Wt: 183.8 lbs;  BMI: 32.8    T: 98.5 F (oral);  BP: 145/71 mm Hg (left arm, sitting);  P: 81 bpm (left arm (BP Cuff), sitting);  sCr: 0.73 mg/dL;  GFR: 81.13        Repeat:     8:49:49 AM     BP:   150/82mm Hg (right arm, sitting)         Exams:     PHYSICAL EXAM:     GENERAL: vital signs recorded - well developed, well nourished;  no apparent distress;     E/N/T:  normal EACs, TMs, nasal/oral mucosa, teeth, gingiva, and oropharynx;     RESPIRATORY: normal respiratory rate and pattern with no distress; normal breath sounds with no rales, rhonchi, wheezes or rubs;     CARDIOVASCULAR: normal rate; rhythm is regular;  no systolic murmur; no edema;     PSYCHIATRIC:  appropriate affect and demeanor; normal speech pattern; grossly normal memory;         ASSESSMENT:           250.00   E11.9  Type 2 diabetes              DDx:     305.1   F17.200  Cigarette smoking              DDx:     780.52   G47.00  Insomnia              DDx:         ORDERS:         Other Orders:       4004F  Pt scrnd tobacco use rcvd tobacco cessation talk  (In-House)                   PLAN:          Type 2 diabetes         FOLLOW-UP: Schedule a follow-up visit in 3 months..  Nutrition Handouts Given: Portion control/Size matters, Dietary Guidelines, Healthy Snacks, Breakfast foods, Label reading, Healthy Fats,      FOLLOW-UP: Schedule a follow-up visit in 3 months.            Prescriptions: declines any meds at present dmt           Patient Education Handouts:       Diabetes (Foot and Skin Problems)        Non-Insulin Dependent Diabetes Mellitus (NIDDM)           Cigarette smoking         RECOMMENDATIONS given include: Counseled on smoking cessation and advised of the benefits to patient's health if she were to stop smoking. Counseling for less than 3 minutes and starting patches this week!!!.            Orders:       4004F  Pt scrnd tobacco use rcvd tobacco cessation talk  (In-House)            Insomnia           Prescriptions: Continue Trazodone             Patient Recommendations:        For  Type 2 diabetes:     Schedule a follow-up visit in 3 months.                APPOINTMENT INFORMATION:        Monday Tuesday Wednesday Thursday Friday Saturday Sunday            Time:___________________AM  PM   Date:_____________________ Schedule a follow-up visit in 3 months.          For  Cigarette smoking:         Stop smoking.              CHARGE CAPTURE:           Primary Diagnosis:     250.00 Type 2 diabetes            E11.9    Type 2 diabetes mellitus without complications              Orders:          02026   Office/outpatient visit; established patient, level 4  (In-House)           305.1 Cigarette smoking            F17.200    Nicotine dependence, unspecified, uncomplicated              Orders:          4004F   Pt scrnd tobacco use rcvd tobacco cessation talk  (In-House)           780.52 Insomnia            G47.00    Insomnia, unspecified

## 2021-05-18 NOTE — PROGRESS NOTES
"Zaria Howard  1950     Office/Outpatient Visit    Visit Date: Wed, Sep 30, 2020 09:26 am    Provider: Alex Girard N.P. (Assistant: Lakesha Cruz, )    Location: [facility]        Electronically signed by Alex Girard N.P. on  09/30/2020 09:52:52 AM                             Subjective:        CC: Noris is a 70 year old White female.  \"thinking im trying to pass a kidney stone\" DOXIMITY [phone];         HPI:       Telehealth visit for left flank pain x 4 days, some nausea no vomiting , no fever, has previous hx of kidney stones x15 different times, last episode was a few years ago . No fever, able to keep water down. Taking MOtrin without control of pain . No diarrhea, or constipation.    ROS:     CONSTITUTIONAL:  Negative for fatigue and fever.      CARDIOVASCULAR:  Negative for chest pain, palpitations, tachycardia, orthopnea, and edema.      RESPIRATORY:  Negative for recent cough, dyspnea and frequent wheezing.      GASTROINTESTINAL:  Positive for nausea and left flank pain around to left kidney area.   Negative for constipation, diarrhea or vomiting.      PSYCHIATRIC:  Negative for anxiety, depression, and sleep disturbances.          Past Medical History / Family History / Social History:         Last Reviewed on 9/30/2020 09:50 AM by Alex Girard    Past Medical History:             PREVENTIVE HEALTH MAINTENANCE             BONE DENSITY: has never been done will think about it in the future     COLORECTAL CANCER SCREENING: declines colorectal cancer screening, understands reason for testing; never     EYE EXAM: Diabetic Eye Exam during this calendar year and results are in chart was last done 8/2019     MAMMOGRAM: declines, understands reason for testing has never been done     PAP SMEAR: was last done S/P FOE with BSO 12/28/2016 No longer indicated due to age and history         PAST MEDICAL HISTORY             ADVANCE DIRECTIVE Living will " and Durable Power of  [facility]             PAST MEDICAL HISTORY         ECHO- 2019-ECHO , borderline hypertrophy, mild mitral regurgitation, possible mitral valve prolapse.          Surgical History:         Hysterectomy: ; and bladder repair;         Family History:     Father: Cerebrovascular Accident ( death in his fifties )     Mother: ;  Myocardial Infarction ( 70's )     Son(s): at [facility], is to be placed in nursing home         Social History:     Occupation:    . Retired     Marital Status:      Children: 3 children (ages 3 living 1 passed away )         Tobacco/Alcohol/Supplements:     Last Reviewed on 2020 09:50 AM by Alex Girard    Tobacco: Current Smoker: She currently smokes every day, 1-1/2 packs per day.          Alcohol:  Does not drink alcohol and never has.      Caffeine:  She admits to consuming caffeine via coffee ( 2 servings per day ).          Mental Health History:     Last Reviewed on 2020 09:50 AM by Alex Girard        Current Problems:     Last Reviewed on 2020 09:50 AM by Alex Girard    Nicotine dependence, unspecified, uncomplicated    Dysthymic disorder    Other persistent mood [affective] disorders    Essential (primary) hypertension    Insomnia, unspecified    Type 2 diabetes mellitus without complications    Personal history of nicotine dependence    Overweight    Chronic obstructive pulmonary disease, unspecified    Encounter for general adult medical examination without abnormal findings    Encounter for screening for depression    ST elevation (STEMI) myocardial infarction involving other sites    Intercostal pain        Immunizations:     None        Allergies:     Last Reviewed on 2020 09:50 AM by Alex Girard    metFORMIN:   (Adverse Reaction)    Sudafed:          Current Medications:     Last Reviewed on 2020 09:50 AM by Alex Girard    hydroCHLOROthiazide 25 mg oral  tablet [1 tab daily]    Xyzal 5 mg oral tablet [Take 1 tablet(s) by mouth each evening]    aspirin 81 mg oral tablet,chewable [1 a day]    nitroglycerin 0.4 mg Sublingual Tablet, Sublingual [AS DIRECTED PRN]    metoprolol succinate 25 mg oral Tablet, Extended Release 24 hr [1T PO QD]    isosorbide mononitrate 30 mg oral Tablet, Extended Release 24 hr [1 tab daily]    Anoro Ellipta 62.5-25 mcg/actuation Inhalation Blister, With Inhalation Device [Take 1 inhalation(s) by mouth daily]    meclizine 12.5 mg oral tablet [TAKE 2 TABLETS  BY ORAL ROUTE 1 HOUR BEFORE EXPOSURE TO MOTION]    Ventolin HFA 90 mcg/actuation Inhalation HFA Aerosol Inhaler [1 puff inhaled every 4-6 hours prn SOB/wheezing or insurance covered]    atorvastatin 10 mg oral tablet [take 1 tablet (10 mg) by oral route once daily]    glipiZIDE 2.5 mg oral Tablet, Extended Release 24 hr [TAKE 1 TABLET BY MOUTH EVERY DAY WITH BREAKFAST]    blood-glucose meter kit  [use to check blood sugar bid for E11.9]    lancets  [use to check blood sugar bid for E11.9]    blood sugar diagnostic strips  [use to check blood sugar bid for E11.9, ]    Januvia 25 mg oral tablet [take 1 tablet (25 mg) by oral route once daily]        Objective:        Exams:     PHYSICAL EXAM:     GENERAL: seems to be in moderate pain;     NEUROLOGIC: GROSSLY INTACT     PSYCHIATRIC: appropriate affect and demeanor; normal psychomotor function; normal speech pattern;         Assessment:         R07.82   Intercostal pain           ORDERS:         Meds Prescribed:       [New Rx] tamsulosin 0.4 mg oral capsule [take 1 capsule (0.4 mg) by oral route once daily], #14 (fourteen) capsules, Refills: 0 (zero)       [New Rx] Norco 7.5-325 mg oral tablet [take 1 tablet by mouth  bid prn pain not improved with Motrin], #15 (fifteen) tablets, Refills: 0 (zero)       [New Rx] Zofran 4 mg oral tablet [1 tablet every 6 hrs prn nausea], #30 (thirty) tablets, Refills: 0 (zero)         Lab Orders:       45045   BDCB2 - [facility] CBC w/o diff  (Send-Out)            32446  [facility] Comp. Metabolic Panel  (Send-Out)            13271  BDUA - [facility] Urinalysis, automated, with micro  (Send-Out)              Other Orders:       09512  CT Renal Stone Protocol (CT abdomen and pelvis w/o IV contrast) no oral prep  (Send-Out; Stat)                      Plan:         Intercostal painTake prn pain meds, if not improving will need to rto or go to ER. antonio call with CT results. dmt    LABORATORY:  Labs ordered to be performed today include CBC W/O DIFF, Comprehensive metabolic panel, and urinalysis with micro.      RADIOLOGY:  I have ordered CT Renal Stone protocol abdomen and pelvis w/o contrast; no oral prep to be done today.  Telehealth: Verbal consent obtained for visit to occur via phone call; Staff, other than provider, present during telephone visit include Zaria Howard , pt Juan Jose Girard APRN; Total time spent was 12 minutes; 41561--Jehbtqqbj E/M 11-20 minutes           Prescriptions:       [New Rx] tamsulosin 0.4 mg oral capsule [take 1 capsule (0.4 mg) by oral route once daily], #14 (fourteen) capsules, Refills: 0 (zero)       [New Rx] Norco 7.5-325 mg oral tablet [take 1 tablet by mouth  bid prn pain not improved with Motrin], #15 (fifteen) tablets, Refills: 0 (zero)       [New Rx] Zofran 4 mg oral tablet [1 tablet every 6 hrs prn nausea], #30 (thirty) tablets, Refills: 0 (zero)           Orders:       [id number]  CB2 - [facility] CBC w/o diff  (Send-Out)            66329  [facility] Comp. Metabolic Panel  (Send-Out)            49017  BDUA - [facility] Urinalysis, automated, with micro  (Send-Out)            03007  CT Renal Stone Protocol (CT abdomen and pelvis w/o IV contrast) no oral prep  (Send-Out; Stat)                  Charge Capture:         Primary Diagnosis:     R07.82  Intercostal pain           Orders:      [id number]  Phys/QHP telephone evaluation 11-20 minutes  (In-House)

## 2021-05-18 NOTE — PROGRESS NOTES
Zaria Howard  1950     Office/Outpatient Visit    Visit Date: Tue, Mar 24, 2020 04:37 pm    Provider: Alex Girard N.P. (Assistant: Ruth Ann Campa, )    Location: [facility]        Electronically signed by Alex Girard N.P. on  03/24/2020 04:54:56 PM                             Subjective:        CC: TELEHEALTH visit to discuss labs. DM    HPI:       TELEHEALTH visit to discuss recent labs 3/10/2020. States she has never taken DM meds and that despite many test , she does not really have DM. Recent A1c 12.4 discussed this with pt that her BG was over 300 at time of OV and she is at high risk related to elevated sugar and elevated cholesterol on OV for either a Heart attack (again) or a Stroke. Patient will try metformin     ROS:     CONSTITUTIONAL:  Negative for fatigue and fever.      CARDIOVASCULAR:  Negative for chest pain, palpitations, tachycardia, orthopnea, and edema.      RESPIRATORY:  Positive for intermittent SOB , wants inhaler , current smoker , hx COPD.   Negative for recent cough, dyspnea or frequent wheezing.      GASTROINTESTINAL:  Negative for abdominal pain, constipation, diarrhea, nausea and vomiting.      GENITOURINARY:  Negative for dysuria and hematuria.      ENDOCRINE:  Positive for Hx DM, but has declined all meds in the past , stated she was not a Diabetic.      PSYCHIATRIC:  Negative for anxiety, depression, and sleep disturbances.          Past Medical History / Family History / Social History:         Last Reviewed on 3/24/2020 04:47 PM by Alex Girard    Past Medical History:             PREVENTIVE HEALTH MAINTENANCE             BONE DENSITY: has never been done will think about it in the future     COLORECTAL CANCER SCREENING: declines colorectal cancer screening, understands reason for testing; never     EYE EXAM: Diabetic Eye Exam during this calendar year and results are in chart was last done 8/2019     MAMMOGRAM: declines,  understands reason for testing has never been done     PAP SMEAR: was last done S/P OFE with BSO 2016 No longer indicated due to age and history         PAST MEDICAL HISTORY             ADVANCE DIRECTIVE Living will and Durable Power of  [facility]             PAST MEDICAL HISTORY         ECHO- 2019-ECHO , borderline hypertrophy, mild mitral regurgitation, possible mitral valve prolapse.          Surgical History:         Hysterectomy: ; and bladder repair;         Family History:     Father: Cerebrovascular Accident ( death in his fifties )     Mother: ;  Myocardial Infarction ( 70's )     Son(s): at [facility], is to be placed in nursing home         Social History:     Occupation:    . Retired     Marital Status:      Children: 3 children (ages 3 living 1 passed away )         Tobacco/Alcohol/Supplements:     Last Reviewed on 3/24/2020 04:47 PM by Alex Girard    Tobacco: Current Smoker: She currently smokes every day, 1-5 cigarettes per day.          Alcohol:  Does not drink alcohol and never has.      Caffeine:  She admits to consuming caffeine via coffee ( 2 servings per day ).          Substance Abuse History:     Last Reviewed on 3/24/2020 04:47 PM by Alex Girard        Mental Health History:     Last Reviewed on 3/24/2020 04:47 PM by Alex Girard        Communicable Diseases (eg STDs):     Last Reviewed on 3/24/2020 04:47 PM by Alex Girard        Current Problems:     Last Reviewed on 3/24/2020 04:47 PM by Alex Girard    Nicotine dependence, unspecified, uncomplicated    Dysthymic disorder    Other persistent mood [affective] disorders    Essential (primary) hypertension    Insomnia, unspecified    Type 2 diabetes mellitus without complications    Personal history of nicotine dependence    Overweight    Chronic obstructive pulmonary disease, unspecified    Encounter for general adult medical examination without abnormal  findings    Encounter for screening for depression    ST elevation (STEMI) myocardial infarction involving other sites        Immunizations:     None        Allergies:     Last Reviewed on 3/24/2020 04:47 PM by Alex Girardd:          Current Medications:     Last Reviewed on 3/24/2020 04:47 PM by Alex Girard    hydroCHLOROthiazide 25 mg oral tablet [1 tab daily]    Xyzal 5 mg oral tablet [Take 1 tablet(s) by mouth each evening]    aspirin 81 mg oral tablet,chewable [1 a day]    Nitroglycerin 0.4 mg Sublingual Tablet, Sublingual [AS DIRECTED PRN]    metoprolol succinate 25 mg oral Tablet, Extended Release 24 hr [TAKE 1 TABLET BY MOUTH EVERY DAY]    Isosorbide Mononitrate 30 mg oral Tablet, Extended Release 24 hr [1 tab daily]    Anoro Ellipta 62.5mcg/25mcg per 1blister Inhalation Powder [Take 1 inhalation(s) by mouth daily]    meclizine 12.5 mg oral tablet [take 2 tablets (25 mg) by oral route 1 hour before exposure to motion]        Objective:        Lab/Test Results:         Estimated Avg Glucose: 309 (mg/dL) (03/10/2020),     Hemoglobin A1c: 12.4 (%) (03/10/2020),     Total Cholesterol: 213 (mg/dL) (03/10/2020),     HDL: 39 (mg/dL) (03/10/2020),     Triglycerides: 323 (mg/dL) (03/10/2020),     LDL: 109 (mg/dL) (03/10/2020),             Assessment:         E11.9   Type 2 diabetes mellitus without complications           ORDERS:         Meds Prescribed:       [New Rx] metFORMIN 750 mg oral Tablet, Extended Release 24 hr [take 1 tablet (750 mg) by oral route once daily ], #90 (ninety) tablets, Refills: 3 (three)       [New Rx] atorvastatin 10 mg oral tablet [take 1 tablet (10 mg) by oral route once daily], #90 (ninety) tablets, Refills: 3 (three)       [New Rx] Ventolin HFA 90 mcg/actuation Inhalation HFA Aerosol Inhaler [1 puff inhaled every 4-6 hours prn SOB/wheezing or insurance covered], #3 (three) blisters, Refills: 4 (four)                 Plan:         Type 2 diabetes mellitus without  complicationsRepeat A1C , CMP , ua and Lipid in 3mo. dmt    Telehealth: Verbal consent obtained for visit to occur via phone call; Staff, other than provider, present during telephone visit include DOLORES Chaidez, cecilio Ayala, and her daughter; Total time spent was 9 minutes; [id number]--Ecgvtdivi E/M 5-10 minutes           Prescriptions:       [New Rx] metFORMIN 750 mg oral Tablet, Extended Release 24 hr [take 1 tablet (750 mg) by oral route once daily ], #90 (ninety) tablets, Refills: 3 (three)       [New Rx] atorvastatin 10 mg oral tablet [take 1 tablet (10 mg) by oral route once daily], #90 (ninety) tablets, Refills: 3 (three)       [New Rx] Ventolin HFA 90 mcg/actuation Inhalation HFA Aerosol Inhaler [1 puff inhaled every 4-6 hours prn SOB/wheezing or insurance covered], #3 (three) blisters, Refills: 4 (four)             Charge Capture:         Primary Diagnosis:     E11.9  Type 2 diabetes mellitus without complications           Orders:      [id number]  Phys/QHP telephone evaluation 5-10 min  (In-House)

## 2021-05-18 NOTE — PROGRESS NOTES
Zaria Howard RE 1950     Office/Outpatient Visit    Visit Date: Tue, Apr 30, 2019 09:26 am    Provider: Alex Girard N.P. (Assistant: Bari Fuentes)    Location: [facility]        Electronically signed by Alex Girard N.P. on  04/30/2019 11:11:53 AM                             SUBJECTIVE:        CC:     Noris is a 68 year old White female.  sinuses and ears bothering her,went to urgent care for this last week, needs tick removed;         HPI:         Patient complains of uRI.  These have been present for the past one week.  The symptoms include ear pain and congestion,  nasal congestion, sinus pain/pressure and clear sputum production.  She has already tried to relieve the symptoms with was on Amoxicillin until 4/29/19 and recieved a pain shot from urgent care.  Medical history is significant for L.ear infection and busted TM.  Now the patients R. ear is bothering her as well.     ROS:     CONSTITUTIONAL:  Negative for chills, fatigue, fever, and weight change.      E/N/T:  Positive for ear pain ( bilateral ), diminished hearing ( bilaterally; the L.ear is worse, but the R. ear feels congested ) and sinus pressure.   Negative for sore throat.      CARDIOVASCULAR:  Negative for chest pain, orthopnea, paroxysmal nocturnal dyspnea and pedal edema.      RESPIRATORY:  Negative for dyspnea.      NEUROLOGICAL:  Negative for dizziness, headaches, paresthesias, and weakness.      PSYCHIATRIC:  Negative for anxiety, depression, and sleep disturbances.          PMH/FMH/SH:     Last Reviewed on 4/30/2019 09:40 AM by Alex Girard    Past Medical History:             PREVENTIVE HEALTH MAINTENANCE             BONE DENSITY: has never been done will think about it in the future     COLORECTAL CANCER SCREENING: declines colorectal cancer screening, understands reason for testing; never     EYE EXAM: Diabetic Eye Exam during this calendar year and results are in chart was last done 6/2018      MAMMOGRAM: declines, understands reason for testing has never been done     PAP SMEAR: was last done S/P OFE with BSO 2016 No longer indicated due to age and history         PAST MEDICAL HISTORY             ADVANCE DIRECTIVE Living will and Durable Power of  [facility]         Surgical History:         Hysterectomy: ; and bladder repair;         Family History:     Father: Cerebrovascular Accident ( death in his fifties )     Mother: ;  Myocardial Infarction ( 70's )     Son(s): at [facility], is to be placed in nursing home         Social History:     Occupation:    . Retired     Marital Status:      Children: 3 children (ages 3 living 1 passed away )         Tobacco/Alcohol/Supplements:     Last Reviewed on 2019 09:40 AM by Alex Girard    Tobacco: Current Smoker: She currently smokes every day, 1-5 cigarettes per day.          Alcohol:  Does not drink alcohol and never has.      Caffeine:  She admits to consuming caffeine via coffee ( 2 servings per day ).              Current Problems:     Last Reviewed on 3/08/2019 10:57 AM by Alex Girard    Microscopic hematuria     Overweight     History of tobacco abuse     Type 2 diabetes     Dysthymic disorder     Hypertension     Allergies     Screening for depression     Insomnia         Immunizations:     None        Allergies:     Last Reviewed on 2019 09:40 AM by Alex Girard    Sudafed:        Current Medications:     Last Reviewed on 2019 09:40 AM by Alex Girard    Metoprolol Succinate 25mg Tablets, Extended Release 1 tab PO daily     Aspirin (ASA) 81mg Chewable Tablet 1 a day     Nitroglycerin 0.4mg Tablets, Sublingual AS DIRECTED PRN     Xyzal Allergy 24hr 5mg Tablet Take 1 tablet(s) by mouth each evening         OBJECTIVE:        Vitals:         Current: 2019 9:32:52 AM    Ht:  5 ft, 2.75 in;  Wt: 180.2 lbs;  BMI: 32.2    T: 97.2 F (oral);  BP: 164/79 mm Hg (right arm,  sitting);  P: 66 bpm (right arm (BP Cuff), sitting);  sCr: 0.74 mg/dL;  GFR: 78.33        Repeat:     9:34:00 AM     BP:   163/88mm Hg (right arm, sitting)         Exams:     PHYSICAL EXAM:     GENERAL: vital signs recorded - well developed, well nourished;  no apparent distress;     E/N/T: EARS: external auditory canal erythematous bilaterally;  the left TM is perforated and dried blood aroun TM and the right TM is has fluid behind it;  OROPHARYNX: posterior pharynx, including tonsils, tongue, and uvula are normal;     NECK: carotid exam reveals no bruits;     RESPIRATORY: normal respiratory rate and pattern with no distress; normal breath sounds with no rales, rhonchi, wheezes or rubs;     CARDIOVASCULAR: normal rate; rhythm is regular;  no systolic murmur; no edema;     NEUROLOGIC: GROSSLY INTACT     PSYCHIATRIC:  appropriate affect and demeanor; normal speech pattern; grossly normal memory;         ASSESSMENT:           382.01   H66.012   H65.01  Acute suppurative otitis media with TM perforation              DDx:         ORDERS:         Meds Prescribed:       Doxycycline Monohydrate 100mg Capsules Take 1 capsule(s) by mouth bid x10 days  #20 (Twenty) capsule(s) Refills: 0       Hydrocortisone/Neomycin/Polymyxin B  1%/0.35%/10,000U Otic Suspension 2 GTTS BID IN AFFECTED EAR  #10 (Ten) ml Refills: 0                 PLAN:          Acute suppurative otitis media with TM perforation         RECOMMENDATIONS given include: to keep ears dry during treatment.      FOLLOW-UP: Advised to call if there is no improvement in 3-4 day(s).   Schedule follow-up appointments on a p.r.n. basis.            Prescriptions:       Doxycycline Monohydrate 100mg Capsules Take 1 capsule(s) by mouth bid x10 days  #20 (Twenty) capsule(s) Refills: 0       Hydrocortisone/Neomycin/Polymyxin B  1%/0.35%/10,000U Otic Suspension 2 GTTS BID IN AFFECTED EAR  #10 (Ten) ml Refills: 0             Patient Recommendations:        For  Acute suppurative  otitis media with TM perforation:     Schedule follow-up appointments as needed.              CHARGE CAPTURE:           Primary Diagnosis:     382.01 Acute suppurative otitis media with TM perforation            H66.012    Acute suppurative otitis media with spontaneous rupture of ear drum, left ear           H65.01    Acute serous otitis media, right ear              Orders:          [id number]   Office/outpatient visit; established patient, level 3  (In-House)

## 2021-05-18 NOTE — PROGRESS NOTES
Zaria Howard  1950     Office/Outpatient Visit    Visit Date: Mon, Apr 27, 2020 08:34 am    Provider: Alex Girard N.P. (Assistant: Stefany Tomas MA)    Location: [facility]        Electronically signed by Alex Girard N.P. on  04/27/2020 09:19:22 AM                             Subjective:        CC: Noris is a 69 year old White female.  This is a follow-up visit.  med refill; [phone] // [employer]        HPI:       Telehealth video visit for follow up on DM. States she has been taking the Metformin and doing well , no nausea, vomiting, diarrhea or any side effects. Does feel gibbons faster but has not noticed a lot of weight loss over past one month. Taking all other meds as directed.     ROS:     CONSTITUTIONAL:  Negative for fatigue and fever.      CARDIOVASCULAR:  Negative for chest pain, palpitations, tachycardia, orthopnea, and edema.      RESPIRATORY:  Negative for recent cough, dyspnea and frequent wheezing.      GASTROINTESTINAL:  Negative for abdominal pain, constipation, diarrhea, nausea and vomiting.      NEUROLOGICAL:  Negative for dizziness, memory loss, paresthesias, tremor and weakness.      PSYCHIATRIC:  Negative for anxiety, depression, and sleep disturbances.          Past Medical History / Family History / Social History:         Last Reviewed on 4/27/2020 09:03 AM by Alex Girard    Past Medical History:             PREVENTIVE HEALTH MAINTENANCE             BONE DENSITY: has never been done will think about it in the future     COLORECTAL CANCER SCREENING: declines colorectal cancer screening, understands reason for testing; never     EYE EXAM: Diabetic Eye Exam during this calendar year and results are in chart was last done 8/2019     MAMMOGRAM: declines, understands reason for testing has never been done     PAP SMEAR: was last done S/P OFE with BSO 12/28/2016 No longer indicated due to age and history         PAST MEDICAL HISTORY              ADVANCE DIRECTIVE Living will and Durable Power of  [facility]             PAST MEDICAL HISTORY         ECHO- 2019-ECHO , borderline hypertrophy, mild mitral regurgitation, possible mitral valve prolapse.          Surgical History:         Hysterectomy: ; and bladder repair;         Family History:     Father: Cerebrovascular Accident ( death in his fifties )     Mother: ;  Myocardial Infarction ( 70's )     Son(s): at [facility], is to be placed in nursing home         Social History:     Occupation:    . Retired     Marital Status:      Children: 3 children (ages 3 living 1 passed away )         Tobacco/Alcohol/Supplements:     Last Reviewed on 2020 09:03 AM by Alex Girard    Tobacco: Current Smoker: She currently smokes every day, 1-5 cigarettes per day.          Alcohol:  Does not drink alcohol and never has.      Caffeine:  She admits to consuming caffeine via coffee ( 2 servings per day ).          Mental Health History:     Last Reviewed on 2020 09:03 AM by Alex Girard        Current Problems:     Last Reviewed on 2020 09:03 AM by Alex Girard    Nicotine dependence, unspecified, uncomplicated    Dysthymic disorder    Other persistent mood [affective] disorders    Essential (primary) hypertension    Insomnia, unspecified    Type 2 diabetes mellitus without complications    Personal history of nicotine dependence    Overweight    Chronic obstructive pulmonary disease, unspecified    Encounter for general adult medical examination without abnormal findings    Encounter for screening for depression    ST elevation (STEMI) myocardial infarction involving other sites        Immunizations:     None        Allergies:     Last Reviewed on 2020 09:03 AM by Alex Girard    Sudafed:          Current Medications:     Last Reviewed on 2020 09:03 AM by Alex Girard    hydroCHLOROthiazide 25 mg oral tablet [1 tab daily]     Xyzal 5 mg oral tablet [Take 1 tablet(s) by mouth each evening]    aspirin 81 mg oral tablet,chewable [1 a day]    Nitroglycerin 0.4 mg Sublingual Tablet, Sublingual [AS DIRECTED PRN]    metoprolol succinate 25 mg oral Tablet, Extended Release 24 hr [1T PO QD]    Isosorbide Mononitrate 30 mg oral Tablet, Extended Release 24 hr [1 tab daily]    Anoro Ellipta 62.5mcg/25mcg per 1blister Inhalation Powder [Take 1 inhalation(s) by mouth daily]    meclizine 12.5 mg oral tablet [TAKE 2 TABLETS  BY ORAL ROUTE 1 HOUR BEFORE EXPOSURE TO MOTION]    metFORMIN 750 mg oral Tablet, Extended Release 24 hr [take 1 tablet (750 mg) by oral route once daily ]    atorvastatin 10 mg oral tablet [take 1 tablet (10 mg) by oral route once daily]    Ventolin HFA 90 mcg/actuation Inhalation HFA Aerosol Inhaler [1 puff inhaled every 4-6 hours prn SOB/wheezing or insurance covered]        Objective:        Exams:     PHYSICAL EXAM:     GENERAL: vital signs recorded - well developed, well nourished;  well groomed;  no apparent distress;     EYES: conjunctiva and cornea are normal;     NEUROLOGIC: GROSSLY INTACT     PSYCHIATRIC:  appropriate affect and demeanor; normal speech pattern; grossly normal memory;         Assessment:         E11.9   Type 2 diabetes mellitus without complications           ORDERS:         Meds Prescribed:       [Refilled] nitroglycerin 0.4 mg Sublingual Tablet, Sublingual [AS DIRECTED PRN], #25 (twenty five) tablets, Refills: 1 (one)       [Refilled] isosorbide mononitrate 30 mg oral Tablet, Extended Release 24 hr [1 tab daily], #90 (ninety) tablets, Refills: 1 (one)       [Refilled] Anoro Ellipta 62.5-25 mcg/actuation Inhalation Blister, With Inhalation Device [Take 1 inhalation(s) by mouth daily], #1 (one) each, Refills: 3 (three)       [Refilled] hydroCHLOROthiazide 25 mg oral tablet [1 tab daily], #90 (ninety) tablets, Refills: 1 (one)       [Refilled] metFORMIN 750 mg oral Tablet, Extended Release 24 hr [take 1  tablet (750 mg) by oral route once daily ], #90 (ninety) tablets, Refills: 1 (one)       [Refilled] atorvastatin 10 mg oral tablet [take 1 tablet (10 mg) by oral route once daily], #90 (ninety) tablets, Refills: 1 (one)       [Refilled] Ventolin HFA 90 mcg/actuation Inhalation HFA Aerosol Inhaler [1 puff inhaled every 4-6 hours prn SOB/wheezing or insurance covered], #3 (three) blisters, Refills: 4 (four)       [Refilled] metoprolol succinate 25 mg oral Tablet, Extended Release 24 hr [1T PO QD], #90 (ninety) tablets, Refills: 1 (one)                 Plan:         Type 2 diabetes mellitus without complications    Telehealth: Verbal consent obtained for visit to occur via televideo conferencing; Staff, other than provider, present during telephone visit include Noris Howard pt, Juan Jose Girard APRN     FOLLOW-UP: Schedule follow-up appointments on a p.r.n. basis.:.:for labs in 3 mo           Prescriptions:       [Refilled] nitroglycerin 0.4 mg Sublingual Tablet, Sublingual [AS DIRECTED PRN], #25 (twenty five) tablets, Refills: 1 (one)       [Refilled] isosorbide mononitrate 30 mg oral Tablet, Extended Release 24 hr [1 tab daily], #90 (ninety) tablets, Refills: 1 (one)       [Refilled] Anoro Ellipta 62.5-25 mcg/actuation Inhalation Blister, With Inhalation Device [Take 1 inhalation(s) by mouth daily], #1 (one) each, Refills: 3 (three)       [Refilled] hydroCHLOROthiazide 25 mg oral tablet [1 tab daily], #90 (ninety) tablets, Refills: 1 (one)       [Refilled] metFORMIN 750 mg oral Tablet, Extended Release 24 hr [take 1 tablet (750 mg) by oral route once daily ], #90 (ninety) tablets, Refills: 1 (one)       [Refilled] atorvastatin 10 mg oral tablet [take 1 tablet (10 mg) by oral route once daily], #90 (ninety) tablets, Refills: 1 (one)       [Refilled] Ventolin HFA 90 mcg/actuation Inhalation HFA Aerosol Inhaler [1 puff inhaled every 4-6 hours prn SOB/wheezing or insurance covered], #3 (three) blisters, Refills: 4  (four)       [Refilled] metoprolol succinate 25 mg oral Tablet, Extended Release 24 hr [1T PO QD], #90 (ninety) tablets, Refills: 1 (one)             Patient Recommendations:        For  Type 2 diabetes mellitus without complications:    Schedule follow-up appointments as needed.                APPOINTMENT INFORMATION:        Monday Tuesday Wednesday Thursday Friday Saturday Sunday            Time:___________________AM  PM   Date:_____________________             Charge Capture:         Primary Diagnosis:     E11.9  Type 2 diabetes mellitus without complications           Orders:      [id number]  Office/outpatient visit; established patient, level 3  (In-House)

## 2021-05-18 NOTE — PROGRESS NOTES
Zaria Howard  1950     Office/Outpatient Visit    Visit Date: Fri, Apr 23, 2021 08:47 am    Provider: Rachna Hernandez N.P. (Assistant: Karen Cesar MA)    Location: [facility]        Electronically signed by Rachna Hernandez N.P. on  04/23/2021 09:55:16 AM                             Subjective:        CC: Noris is a 70 year old White female.  to establish care;         HPI: 69 y/o female presenting to office wishing to establish care. She was previously seeing DOLORES Dennis. Pt has hx of DM II with a ha1c of 7.4 (improved from initial 11). She has hx of HTN and STEMI. She has anxiety/depression but declines medication. She is currently smoking 1/2 ppd (cut back from 2 ppd). Pt has been dx with COPD, but does not complain of soa. Is not any daily inhalers, but does have albuterol inhaler prn. She does not need any glucometer supplies at this time. Declines preventive testing such as mammogram, colonoscopy and pelvic exam. (pap no longer indicated d/t total hyst.). Medical, sx, family and social hx reviewed and updated in chart.    ROS:     CONSTITUTIONAL:  Negative for fatigue, fever and night sweats.      EYES:  Negative for blurred vision.      CARDIOVASCULAR:  Negative for chest pain, palpitations and pedal edema.      RESPIRATORY:  Negative for recent cough and dyspnea.      GASTROINTESTINAL:  Negative for abdominal pain, constipation, diarrhea, nausea and vomiting.      MUSCULOSKELETAL:  Negative for arthralgias and myalgias.      INTEGUMENTARY/BREAST:  Negative for rash.      NEUROLOGICAL:  Negative for dizziness, paresthesias and weakness.      PSYCHIATRIC:  Negative for anxiety, depression, sleep disturbance and suicidal thoughts.          Past Medical History / Family History / Social History:         Last Reviewed on 4/23/2021 09:47 AM by Rachna Hernandez    Past Medical History:             PREVENTIVE HEALTH MAINTENANCE             BONE DENSITY: has never  been done will think about it in the future     COLORECTAL CANCER SCREENING: declines colorectal cancer screening, understands reason for testing; never     EYE EXAM: Diabetic Eye Exam during this calendar year and results are in chart was last done 2020 The next one is due  next one due 2021     MAMMOGRAM: declines, understands reason for testing has never been done     PAP SMEAR: was last done S/P OFE with BSO 2016 No longer indicated due to age and history         PAST MEDICAL HISTORY             ADVANCE DIRECTIVE Living will and Durable Power of  [facility]             PAST MEDICAL HISTORY         ECHO- 2019-ECHO , borderline hypertrophy, mild mitral regurgitation, possible mitral valve prolapse.          Surgical History:         Appendectomy    Cholecystectomy    Hysterectomy: ; and bladder repair; Other Surgeries    Cataract Removal: bilateral; ;         Family History:     Father: Cerebrovascular Accident ( death in his fifties )     Mother: ;  Myocardial Infarction ( 70's )     Sister(s): Endocrine Type 2 Diabetes         Social History:     Occupation:    . Retired     Marital Status:      Children: 3 children (ages 3 living 1 passed away )         Tobacco/Alcohol/Supplements:     Last Reviewed on 2021 09:47 AM by Rachna Hernandez    Tobacco: Current Smoker: She currently smokes every day, 1/2 pack per day.          Alcohol:  Does not drink alcohol and never has.      Caffeine:  She admits to consuming caffeine via coffee ( 2 servings per day ).          Substance Abuse History:     Last Reviewed on 2021 09:47 AM by Rachna Hernandez    None         Mental Health History:     Last Reviewed on 2021 09:47 AM by Rachna Hernandez    NEGATIVE         Communicable Diseases (eg STDs):     Last Reviewed on 2021 09:47 AM by Rachna Hernandez        Immunizations:     SARS-COV-2 (COVID-19) vaccine, UNSPECIFIED 3/4/2021    SARS-COV-2  (COVID-19) vaccine, UNSPECIFIED 4/1/2021        Allergies:     Last Reviewed on 4/23/2021 09:47 AM by Rachna Hernandez    metFORMIN:   (Adverse Reaction)    Sudafed:          Current Medications:     Last Reviewed on 4/23/2021 09:47 AM by Rachna Hernandez    hydroCHLOROthiazide 25 mg oral tablet [TAKE 1 TABLET BY MOUTH DAILY]    Xyzal 5 mg oral tablet [Take 1 tablet(s) by mouth each evening]    aspirin 81 mg oral tablet,chewable [1 a day]    metoprolol succinate 25 mg oral Tablet, Extended Release 24 hr [TAKE 1 TABLET BY MOUTH EVERY DAY]    nitroglycerin 0.4 mg Sublingual Tablet, Sublingual [AS DIRECTED PRN]    isosorbide mononitrate 30 mg oral Tablet, Extended Release 24 hr [TAKE 1 TABLET BY MOUTH DAILY]    meclizine 12.5 mg oral tablet [TAKE 2 TABLETS  BY ORAL ROUTE 1 HOUR BEFORE EXPOSURE TO MOTION]    atorvastatin 10 mg oral tablet [take 1 tablet (10 mg) by oral route once daily]    Ventolin HFA 90 mcg/actuation Inhalation HFA Aerosol Inhaler [1 puff inhaled every 4-6 hours prn SOB/wheezing or insurance covered]    Contour Test Strips  [blood glucose test strips, to check BG tid and prn , Dx E11.9, substitute per insurance requirements ]    lancets  [use to check blood sugar bid for E11.9]    glipiZIDE 2.5 mg oral Tablet, Extended Release 24 hr [TAKE 1 TABLET BY MOUTH EVERY DAY WITH BREAKFAST]    blood-glucose meter kit  [use to check blood sugar bid for E11.9]    Zofran 4 mg oral tablet [1 tablet every 6 hrs prn nausea]    Trulicity 0.75 mg/0.5 mL subcutaneous Pen Injector [inject 0.5 milliliter (0.75 mg) by subcutaneous route every 7 days inthe abdomen, thigh, or upper arm rotating injection sites]    blood sugar diagnostic strips  [Acucheck test strips check blood glucose three times per day. DX E11.9]        Objective:        Vitals:         Current: 4/23/2021 8:52:50 AM    Ht:  5 ft, 2.75 in;  Wt: 164.6 lbs;  BMI: 29.4T: 97 F (temporal);  BP: 139/60 mm Hg (left arm, sitting);  P: 73 bpm (left arm (BP  Cuff), sitting);  sCr: 0.73 mg/dL;  GFR: 74.38        Exams:     PHYSICAL EXAM:     GENERAL: vital signs recorded - well developed, well nourished;  well groomed;  no apparent distress;     EYES: extraocular movements intact; conjunctiva and cornea are normal; PERRLA;     NECK: range of motion is normal; thyroid exam reveals not enlarged;  carotid exam is normal with good upstroke and no bruits;     RESPIRATORY: normal respiratory rate and pattern with no distress; normal breath sounds with no rales, rhonchi, wheezes or rubs;     CARDIOVASCULAR: normal rate; rhythm is regular;  no systolic murmur; no edema;     LYMPHATIC: no enlargement of cervical or facial nodes;     BREAST/INTEGUMENT: No rash/jaundice;     MUSCULOSKELETAL: normal gait; normal overall tone     NEUROLOGIC: mental status: alert and oriented x 3;     PSYCHIATRIC:  appropriate affect and demeanor; normal speech pattern; grossly normal memory;         Lab/Test Results:         Glucose, Serum: 149 (mg/dL) (03/12/2021),     BUN: 19 (mg/dl) (03/12/2021),     Creatinine, Serum: 0.73 (mg/dl) (03/12/2021),     BUN/Creatinine Ratio: 26 (Ratio) (03/12/2021),     Glom Filt Rate, Est: >60 (ml/min/1.73m2) (03/12/2021),     Sodium, Serum: 137 (mmol/L) (03/12/2021),     Potassium, Serum: 3.9 (mmol/L) (03/12/2021),     Chloride, Serum: 98 (mmol/L) (03/12/2021),     Carbon Dioxide, Total: 27 (mmol/L) (03/12/2021),     Osmolality, Serum: 289 (mOsm/kg) (03/12/2021),     Protein, Total: 6.6 (g/dl) (03/12/2021),     Albumin, Serum: 4.2 (g/dl) (03/12/2021),     A/G Ratio: 1.8 (RATIO) (03/12/2021),     Calcium, Total: 9.5 (mg/dl) (03/12/2021),     Alkaline Phosphatase, Serum: 88 (U/L) (03/12/2021),     ALT (SGPT): 9 (U/L) (03/12/2021),     AST (SGOT): 11 (U/L) (03/12/2021),     Bilirubin, Total: 0.54 (mg/dL) (03/12/2021),     Anion gap: 16 (mmol/l) (03/12/2021),     Globulin, Total: 2.4 (g/dl) (03/12/2021),     Estimated Avg Glucose: 166 (mg/dL) (03/12/2021),      Hemoglobin A1c: 7.4 (%) (03/12/2021),             Assessment:         F17.200   Nicotine dependence, unspecified, uncomplicated       I10   Essential (primary) hypertension       E11.9   Type 2 diabetes mellitus without complications           Plan:         Nicotine dependence, unspecified, uncomplicatedContinue smoking cessation efforts. Pt declines medication. F/u as needed.         Essential (primary) hypertensionContinue medications as rx. Will continue to monitor.         Type 2 diabetes mellitus without complicationsContinue medications as rx. Start routine exercise and encouraged diet modifcations. Pt to f/u as needed. Pt v/u and had no further questions upon d/c.             Charge Capture:         Primary Diagnosis:     F17.200  Nicotine dependence, unspecified, uncomplicated           Orders:      [id number]  Office/outpatient visit; established patient, level 4  (In-House)              I10  Essential (primary) hypertension     E11.9  Type 2 diabetes mellitus without complications         ADDENDUMS:      ____________________________________    Addendum: 05/14/2021 01:[address] [id number]    add [id number]

## 2021-05-18 NOTE — PROGRESS NOTES
"Zaria HowardBecca 1950     Office/Outpatient Visit    Visit Date: Wed, May 29, 2019 01:01 pm    Provider: Alex Girard N.P. (Assistant: Abida Koehler MA)    Location: [facility]        Electronically signed by Alex Girard N.P. on  06/01/2019 05:00:45 PM                             SUBJECTIVE:        CC:     Noris is a 69 year old White female.  She is here today following a transition of care from an inpatient hospital: [facility] for COPD. The patient was admitted on 5-22-19 and discharged on 5-22-19 for 24 hour observation. Our office called the patient within 48 hours of discharge and scheduled the follow-up appointment.. During the patient's hospital stay the patient was treated by Dr. Kelley.  The patient is accompanied into the exam room by her family member.          HPI:     Here for [facility] from [facility]. Admit 5/22 with new onset COPD and possible CHF and elevated glucose. She told them she does not have DM at [facility] but her glucose was up from taking steroids. She declines DM meds, declines statins from Cardiology and from us. Discussed her diagnosis and she states, \" I was not dx with COPD but was dx with Bronchitis\" , denies dx of CHF. Was treated with antibiotics and inhalers and Lasix. Just seen by Cardiology and had ECHO in last few weeks, discussed her ECHO results.      ROS:     CONSTITUTIONAL:  Negative for fatigue and fever.      E/N/T:  Positive for diminished hearing ( bilaterally; muffled ) and nasal congestion.      CARDIOVASCULAR:  Positive for Denies swelling , SOB with lying down or bending over..      RESPIRATORY:  Positive for Recent cough and SOB  and wheezing improved.   Negative for recent cough, dyspnea or frequent wheezing.      GASTROINTESTINAL:  Negative for abdominal pain, constipation, diarrhea, nausea and vomiting.      GENITOURINARY:  Negative for dysuria and hematuria.      NEUROLOGICAL:  Positive for dizziness ( worse with head " turning ).      PSYCHIATRIC:  Negative for anxiety, depression, and sleep disturbances.          PMH/FMH/SH:     Last Reviewed on 2019 04:59 PM by Alex Girard    Past Medical History:             PREVENTIVE HEALTH MAINTENANCE             BONE DENSITY: has never been done will think about it in the future     COLORECTAL CANCER SCREENING: declines colorectal cancer screening, understands reason for testing; never     EYE EXAM: Diabetic Eye Exam during this calendar year and results are in chart was last done 2018     MAMMOGRAM: declines, understands reason for testing has never been done     PAP SMEAR: was last done S/P OFE with BSO 2016 No longer indicated due to age and history         PAST MEDICAL HISTORY             ADVANCE DIRECTIVE Living will and Durable Power of  [facility]             PAST MEDICAL HISTORY         ECHO- 2019-ECHO , borderline hypertrophy, mild mitral regurgitation, possible mitral valve prolapse.          Surgical History:         Hysterectomy: ; and bladder repair;         Family History:     Father: Cerebrovascular Accident ( death in his fifties )     Mother: ;  Myocardial Infarction ( 70's )     Son(s): at [facility], is to be placed in nursing home         Social History:     Occupation:    . Retired     Marital Status:      Children: 3 children (ages 3 living 1 passed away )         Tobacco/Alcohol/Supplements:     Last Reviewed on 2019 04:59 PM by Alex Girard    Tobacco: Current Smoker: She currently smokes every day, 1-5 cigarettes per day.          Alcohol:  Does not drink alcohol and never has.      Caffeine:  She admits to consuming caffeine via coffee ( 2 servings per day ).              Current Problems:     Last Reviewed on 2019 04:59 PM by Alex Girard    COPD     Microscopic hematuria     Overweight     History of tobacco abuse     Type 2 diabetes     Dysthymic disorder     Hypertension     ETD      Follow-up examination     Acute suppurative otitis media with TM perforation     Insomnia         Immunizations:     None        Allergies:     Last Reviewed on 6/01/2019 04:59 PM by Alex Girard    Sudafed:        Current Medications:     Last Reviewed on 6/01/2019 04:59 PM by Alex Girard    Metoprolol Succinate 25mg Tablets, Extended Release 1 tab PO daily     Isosorbide Mononitrate 30mg Tablets, Extended Release 1 tab daily     Aspirin (ASA) 81mg Chewable Tablet 1 a day     Nitroglycerin 0.4mg Tablets, Sublingual AS DIRECTED PRN     Xyzal Allergy 24hr 5mg Tablet Take 1 tablet(s) by mouth each evening         OBJECTIVE:        Vitals:         Current: 5/29/2019 1:07:32 PM    Ht:  5 ft, 2.75 in;  Wt: 180 lbs;  BMI: 32.1    T: 98.3 F (oral);  BP: 146/66 mm Hg (left arm, sitting);  P: 82 bpm (left arm (BP Cuff), sitting);  sCr: 0.74 mg/dL;  GFR: 77.26        Exams:     PHYSICAL EXAM:     GENERAL: vital signs recorded - well developed,  mildly obese;  well groomed;  no apparent distress;     E/N/T: EARS: both TMs are have fluid behind them;     RESPIRATORY: normal respiratory rate and pattern with no distress; normal breath sounds with no rales, rhonchi, wheezes or rubs;     CARDIOVASCULAR: normal rate; rhythm is regular;  no systolic murmur; no edema;     GASTROINTESTINAL: nontender, nondistended; no hepatosplenomegaly or masses; no bruits;     NEUROLOGIC: GROSSLY INTACT     PSYCHIATRIC:  appropriate affect and demeanor; normal speech pattern; grossly normal memory;         ASSESSMENT           496   J44.9  COPD              DDx:     401.1   I10  Hypertension              DDx:     381.81   H69.81  ETD              DDx:         ORDERS:         Meds Prescribed:       Refill of: Metoprolol Succinate 25mg Tablets, Extended Release 1 tab PO daily  #90 (Ninety) tablet(s) Refills: 0       Anoro Ellipta (Umeclidinium/Vilanterol) Inhalation Powder Take 1 inhalation(s) by mouth daily  #1 (One) inhaler(s)  Refills: 3       Hydrochlorothiazide (HCTZ) 25mg Tablet 1 tab daily  #30 (Thirty) tablet(s) Refills: 2       Fluticasone Propionate 50mcg/1actuation Nasal Spray 1 spray each nostil daily  #3 (Three) gm Refills: 1         Lab Orders:       FUTURE  Future order to be done at patients convenience  (Send-Out)         [id number]  [facility] Basic Metabolic Panel  (Send-Out)         APPTO  Appointment need  (In-House)                   PLAN:          COPD If Anoro is not covered or is expensive co pay, we can try alternate medication. We also can change to Mail order pharmacy to get better pricing for your meds. dmt         FOLLOW-UP: Schedule a follow-up visit in 3 months..            Prescriptions:       Anoro Ellipta (Umeclidinium/Vilanterol) Inhalation Powder Take 1 inhalation(s) by mouth daily  #1 (One) inhaler(s) Refills: 3           Orders:       APPTO  Appointment need  (In-House)            Hypertension         FOLLOW-UP TESTING #1: FOLLOW-UP LABORATORY:  Labs to be scheduled in the future include BMP.            Prescriptions:       Refill of: Metoprolol Succinate 25mg Tablets, Extended Release 1 tab PO daily  #90 (Ninety) tablet(s) Refills: 0       Hydrochlorothiazide (HCTZ) 25mg Tablet 1 tab daily  #30 (Thirty) tablet(s) Refills: 2           Orders:       FUTURE  Future order to be done at patients convenience  (Send-Out)         [id number]  [facility] Basic Metabolic Panel  (Send-Out)            ETD           Prescriptions:       Fluticasone Propionate 50mcg/1actuation Nasal Spray 1 spray each nostil daily  #3 (Three) gm Refills: 1             Patient Recommendations:        For  COPD:     Schedule a follow-up visit in 3 months.                APPOINTMENT INFORMATION:        Monday Tuesday Wednesday Thursday Friday Saturday Sunday            Time:___________________AM  PM   Date:_____________________         For  Hypertension:             The following laboratory testing has been ordered: metabolic panel, basic              CHARGE CAPTURE           **Please note: ICD descriptions below are intended for billing purposes only and may not represent clinical diagnoses**        Primary Diagnosis:         496 COPD            J44.9    Chronic obstructive pulmonary disease, unspecified              Orders:          01315   Transitional care manage service 7 day discharge  (In-House)             APPTO   Appointment need  (In-House)           401.1 Hypertension            I10    Essential (primary) hypertension    381.81 ETD            H69.81    Other specified disorders of Eustachian tube, right ear

## 2021-05-18 NOTE — PROGRESS NOTES
Zaria Howard  1950     Office/Outpatient Visit    Visit Date: Fri, Mar 12, 2021 09:24 am    Provider: Alex Girard N.P. (Assistant: Ana Alexis MA)    Location: [facility]        Electronically signed by Alex Girard N.P. on  03/12/2021 10:16:39 AM                             Subjective:        CC: Noris is a 70 year old White female.  MCW;         HPI:           Noris is here for a Medicare wellness visit.  The required HRA questions are integrated within this visit note. Family medical history and individual medical/surgical history were reviewed and updated.  A current height, weight, BMI, blood pressure, and pulse were recorded in the vitals section of the note and have been reviewed. Patient's medications, including supplements, were recorded in the chart and reviewed.  Current providers and suppliers were reviewed and updated.          Self-Assessment of Health: She rates her health as very good. She rates her confidence of being able to control/manage most of her health problems as very confident. Her physical/emotional health has limited her social activites not at all.  A review of possible cognitive impairment was performed and the following was noted: she is having difficulty driving;  not experiencing changes in memory;  she is not having trouble with her finances;  Memory Impairment Screen is normal.  A review of functional ability, including bathing, dressing, walking, and urine/bowel continence as well as level of safety was performed and was found to be negative.  Falls Risk: Has not had any falls or only one fall without injury in the past year.  She denies having trouble hearing the TV/radio when others do not, having to strain to hear or understand conversations and wearing hearing aid(s).  Concerning home safety, she reports that at home she DOES have adequate lighting, a skid resistant shower/tub, functioning smoke alarms and absence  of throw rugs, but not grab bars in the bath or handrails on stairs.          Immunization Status: ** >10 years since last Td booster; ** Has not received pneumococcal vaccination; ** Has not received influenza vaccine for this season; ** Has not received Prevnar 13 vaccination; Age>60, no shingles vaccination; Physical Activity: She exercises for at least 20 minutes 3 or more days/week.; Type of diet patient normally eats is described as diabetic diet Preventative Health updated today           PHQ-9 Depression Screening: Completed form scanned and in chart; Total Score 1     ROS:     CONSTITUTIONAL:  Negative for fatigue and fever.      E/N/T:  Negative for diminished hearing and nasal congestion.      CARDIOVASCULAR:  Negative for chest pain, palpitations, tachycardia, orthopnea, and edema.      RESPIRATORY:  Positive for smoking but down to 1/2 pack a day.   Negative for recent cough, dyspnea or frequent wheezing.      GASTROINTESTINAL:  Negative for abdominal pain, constipation, diarrhea, nausea and vomiting.      GENITOURINARY:  Negative for dysuria and hematuria.      MUSCULOSKELETAL:  Positive for arthralgias.   Negative for myalgias.      INTEGUMENTARY/BREAST:  Negative for atypical mole(s) and rash.      NEUROLOGICAL:  Negative for dizziness, memory loss, paresthesias, tremor and weakness.      ENDOCRINE:  Positive for Checking BG bid, normally 110-130s , this am was 117.      PSYCHIATRIC:  Negative for anxiety, depression, and sleep disturbances.          Past Medical History / Family History / Social History:         Last Reviewed on 3/12/2021 09:55 AM by Alex Girard    Past Medical History:             PREVENTIVE HEALTH MAINTENANCE             BONE DENSITY: has never been done will think about it in the future     COLORECTAL CANCER SCREENING: declines colorectal cancer screening, understands reason for testing; never     EYE EXAM: Diabetic Eye Exam during this calendar year and results are in  chart was last done 2020 The next one is due  next one due 2021     MAMMOGRAM: declines, understands reason for testing has never been done     PAP SMEAR: was last done S/P OFE with BSO 2016 No longer indicated due to age and history         PAST MEDICAL HISTORY             ADVANCE DIRECTIVE Living will and Durable Power of  [facility]             PAST MEDICAL HISTORY         ECHO- 2019-ECHO , borderline hypertrophy, mild mitral regurgitation, possible mitral valve prolapse.          Surgical History:         Hysterectomy: ; and bladder repair; Other Surgeries    Cataract Removal: bilateral; ;         Family History:     Father: Cerebrovascular Accident ( death in his fifties )     Mother: ;  Myocardial Infarction ( 70's )     Son(s): at [facility], is to be placed in nursing home         Social History:     Occupation:    . Retired     Marital Status:      Children: 3 children (ages 3 living 1 passed away )         Tobacco/Alcohol/Supplements:     Last Reviewed on 3/12/2021 09:55 AM by Alex Girard    Tobacco: Current Smoker: She currently smokes every day, 1/2 to 1 pack per day.          Alcohol:  Does not drink alcohol and never has.      Caffeine:  She admits to consuming caffeine via coffee ( 2 servings per day ).          Mental Health History:     Last Reviewed on 3/12/2021 09:55 AM by Alex Girard        Current Problems:     Last Reviewed on 3/12/2021 09:55 AM by Alex Girard    Nicotine dependence, unspecified, uncomplicated    Dysthymic disorder    Other persistent mood [affective] disorders    Essential (primary) hypertension    Insomnia, unspecified    Type 2 diabetes mellitus without complications    Personal history of nicotine dependence    Overweight    Chronic obstructive pulmonary disease, unspecified    Encounter for general adult medical examination without abnormal findings    Encounter for screening for depression     ST elevation (STEMI) myocardial infarction involving other sites    Intercostal pain    Solitary pulmonary nodule        Immunizations:     COVID-19, mRNA, LNP-S, PF, 100 mcg/0.5 mL dose 3/4/2021        Allergies:     Last Reviewed on 3/12/2021 09:55 AM by Alex Girard    metFORMIN:   (Adverse Reaction)    Sudafed:          Current Medications:     Last Reviewed on 3/12/2021 09:55 AM by Alex Girard    hydroCHLOROthiazide 25 mg oral tablet [TAKE 1 TABLET BY MOUTH DAILY]    Xyzal 5 mg oral tablet [Take 1 tablet(s) by mouth each evening]    aspirin 81 mg oral tablet,chewable [1 a day]    nitroglycerin 0.4 mg Sublingual Tablet, Sublingual [AS DIRECTED PRN]    metoprolol succinate 25 mg oral Tablet, Extended Release 24 hr [TAKE 1 TABLET BY MOUTH EVERY DAY]    isosorbide mononitrate 30 mg oral Tablet, Extended Release 24 hr [TAKE 1 TABLET BY MOUTH DAILY]    meclizine 12.5 mg oral tablet [TAKE 2 TABLETS  BY ORAL ROUTE 1 HOUR BEFORE EXPOSURE TO MOTION]    Ventolin HFA 90 mcg/actuation Inhalation HFA Aerosol Inhaler [1 puff inhaled every 4-6 hours prn SOB/wheezing or insurance covered]    atorvastatin 10 mg oral tablet [take 1 tablet (10 mg) by oral route once daily]    lancets  [use to check blood sugar bid for E11.9]    glipiZIDE 2.5 mg oral Tablet, Extended Release 24 hr [TAKE 1 TABLET BY MOUTH EVERY DAY WITH BREAKFAST]    blood-glucose meter kit  [use to check blood sugar bid for E11.9]    Januvia 25 mg oral tablet [take 1 tablet (25 mg) by oral route once daily]    Zofran 4 mg oral tablet [1 tablet every 6 hrs prn nausea]    Trulicity 0.75 mg/0.5 mL subcutaneous Pen Injector [inject 0.5 milliliter (0.75 mg) by subcutaneous route every 7 days inthe abdomen, thigh, or upper arm rotating injection sites]    Contour Test Strips [blood glucose test strips, to check BG tid and prn , Dx E11.9, substitute per insurance requirements ]        Objective:        Vitals:         Current: 3/12/2021 9:35:16 AM    Ht:  Heri  ft, 2.75 in;  Wt: 159.2 lbs;  BMI: 28.4T: 96.8 F (temporal);  BP: 119/63 mm Hg (left arm, sitting);  P: 70 bpm (left arm (BP Cuff), sitting);  sCr: 0.82 mg/dL;  GFR: 65.28        Exams:     PHYSICAL EXAM:     GENERAL: vital signs recorded - well developed, well nourished;  well groomed;  no apparent distress;     E/N/T:  normal EACs, TMs, nasal/oral mucosa, teeth, gingiva, and oropharynx;     NECK: thyroid is non-palpable; carotid exam is normal with good upstroke and no bruits;     RESPIRATORY: normal respiratory rate and pattern with no distress; normal breath sounds with no rales, rhonchi, wheezes or rubs;     CARDIOVASCULAR: normal rate; rhythm is regular;  no systolic murmur; no edema;     GASTROINTESTINAL: nontender, nondistended; no hepatosplenomegaly or masses; no bruits;     MUSCULOSKELETAL:  Normal range of motion, strength and tone;     NEUROLOGIC: GROSSLY INTACT     PSYCHIATRIC:  appropriate affect and demeanor; normal speech pattern; grossly normal memory;     Left foot exam    Protective sensation using Monofilament test: NORMAL sensation. Patient detects .07 grams of force which is considered normal.    Vascular status: normal peripheral vascular exam with palpable dorsal pedal and posterior tibal pulses and brisk digital capillary refill    Skin is intact without sores or ulcers    Right foot exam    Protective sensation using Monofilament test: NORMAL sensation. Patient detects .07 grams of force which is considered normal.    Vascular status: normal peripheral vascular exam with palpable dorsal pedal and posterior tibal pulses and brisk digital capillary refill    Skin is intact without sores or ulcers         Assessment:         Z00.00   Encounter for general adult medical examination without abnormal findings       Z13.31   Encounter for screening for depression       F17.200   Nicotine dependence, unspecified, uncomplicated       I10   Essential (primary) hypertension       E11.9   Type 2  diabetes mellitus without complications           ORDERS:         Meds Prescribed:       [Refilled] metoprolol succinate 25 mg oral Tablet, Extended Release 24 hr [TAKE 1 TABLET BY MOUTH EVERY DAY], #90 (ninety) tablets, Refills: 1 (one)       [Refilled] isosorbide mononitrate 30 mg oral Tablet, Extended Release 24 hr [TAKE 1 TABLET BY MOUTH DAILY], #90 (ninety) tablets, Refills: 1 (one)       [Refilled] atorvastatin 10 mg oral tablet [take 1 tablet (10 mg) by oral route once daily], #90 (ninety) tablets, Refills: 1 (one)       [Refilled] hydroCHLOROthiazide 25 mg oral tablet [TAKE 1 TABLET BY MOUTH DAILY], #90 (ninety) tablets, Refills: 1 (one)         Radiology/Test Orders:       2022F  Dilated retinal eye exam w/interpret by ophthalmologist/optometrist documented/reviewed (DM)4  (In-House)              Lab Orders:       09962  BDCB2 - [facility] CBC w/o diff  (Send-Out)            28347  TSH - [facility] TSH  (Send-Out)            04093  DIAB2 - [facility] CMP A1C LIPID AND MICRO ALBUM CR RATIO: [id number]  (Send-Out)            32202  BDUAM - [facility] Urinalysis, automated, with micro  (Send-Out)            APPTO  Appointment need  (In-House)              Procedures Ordered:         Annual wellness visit, includes a PPPS, subsequent visit  (In-House)              Other Orders:       2028F  Foot examination performed (includes examination through visual inspection, sensory exam with monofilament, and pulse exam - report when any of the three components are completed) (DM)4  (In-House)              Depression screen negative  (In-House)            1101F  Pt screen for fall risk; document no falls in past year or only 1 fall w/o injury in past year (NISHA)  (In-House)                      Plan:         Encounter for general adult medical examination without abnormal findings        RECOMMENDATIONS given include: Further recommendation to be given after test results are complete.  MIPS Has had no falls or only one  fall without injury in the past year Diabetic Eye Exam during this calendar year and results are in chart ADVANCED DIRECTIVES: None         FOLLOW-UP: Schedule a follow-up visit in 6 months.:.            Orders:       1101F  Pt screen for fall risk; document no falls in past year or only 1 fall w/o injury in past year (NISHA)  (In-House)            2022F  Dilated retinal eye exam w/interpret by ophthalmologist/optometrist documented/reviewed (DM)4  (In-House)            APPTO  Appointment need  (In-House)              Annual wellness visit, includes a PPPS, subsequent visit  (In-House)              Encounter for screening for depression    MIPS PHQ-9 Depression Screening: Completed form scanned and in chart; Total Score 1; Negative Depression Screen           Orders:         Depression screen negative  (In-House)              Nicotine dependence, unspecified, uncomplicated    MIPS Smoking cessation encouraged. Counseling for less than 3 minutes.          Essential (primary) hypertension    LABORATORY:  Labs ordered to be performed today include CBC W/O DIFF and TSH.            Prescriptions:       [Refilled] metoprolol succinate 25 mg oral Tablet, Extended Release 24 hr [TAKE 1 TABLET BY MOUTH EVERY DAY], #90 (ninety) tablets, Refills: 1 (one)       [Refilled] isosorbide mononitrate 30 mg oral Tablet, Extended Release 24 hr [TAKE 1 TABLET BY MOUTH DAILY], #90 (ninety) tablets, Refills: 1 (one)       [Refilled] atorvastatin 10 mg oral tablet [take 1 tablet (10 mg) by oral route once daily], #90 (ninety) tablets, Refills: 1 (one)       [Refilled] hydroCHLOROthiazide 25 mg oral tablet [TAKE 1 TABLET BY MOUTH DAILY], #90 (ninety) tablets, Refills: 1 (one)           Orders:       83250  [facility] - [facility] CBC w/o diff  (Send-Out)            09520  TSH - [facility] TSH  (Send-Out)            51983  BDUAM - [facility] Urinalysis, automated, with micro  (Send-Out)              Type 2 diabetes mellitus without complicationsWill send meds  once labs return    LABORATORY:  Labs ordered to be performed today include Diabetes Panel 2;CMP, A1C, Lipid, Microalbumin:Creatinine Ratio.            Orders:       04267  DIAB2 - [facility] CMP A1C LIPID AND MICRO ALBUM CR RATIO: [id number]  (Send-Out)            2028F  Foot examination performed (includes examination through visual inspection, sensory exam with monofilament, and pulse exam - report when any of the three components are completed) (DM)4  (In-House)                  Patient Recommendations:        For  Encounter for general adult medical examination without abnormal findings:    Schedule a follow-up visit in 6 months.                APPOINTMENT INFORMATION:        Monday Tuesday Wednesday Thursday Friday Saturday Sunday            Time:___________________AM  PM   Date:_____________________             Charge Capture:         Primary Diagnosis:     Z00.00  Encounter for general adult medical examination without abnormal findings           Orders:      61497  Preventive medicine, established patient, age 65+ years  (In-House)            1101F  Pt screen for fall risk; document no falls in past year or only 1 fall w/o injury in past year (NISHA)  (In-House)            2022F  Dilated retinal eye exam w/interpret by ophthalmologist/optometrist documented/reviewed (DM)4  (In-House)            APPTO  Appointment need  (In-House)              Annual wellness visit, includes a PPPS, subsequent visit  (In-House)              Z13.31  Encounter for screening for depression           Orders:        Depression screen negative  (In-House)              F17.200  Nicotine dependence, unspecified, uncomplicated     I10  Essential (primary) hypertension     E11.9  Type 2 diabetes mellitus without complications           Orders:      2028F  Foot examination performed (includes examination through visual inspection, sensory exam with monofilament, and pulse exam - report when any of the three  components are completed) (DM)4  (In-House)

## 2021-05-18 NOTE — PROGRESS NOTES
Zaria HowardBecca 1950     Office/Outpatient Visit    Visit Date: Mon, Oct 1, 2018 01:29 pm    Provider: Alex Girard N.P. (Assistant: Abida Koehler MA)    Location: [facility]        Electronically signed by Alex Girard N.P. on  10/01/2018 04:22:33 PM                             SUBJECTIVE:        CC:     DEANNE is a 68 year old White female.  She is here today following a transition of care from the emergency department ( [facility] on 9-29-18 for chest pain ).          HPI:     Was sitting doing bills x 2 days ago, started having chest pain, and pain in her left arm and pain was going up into her left neck. Pain lasted 3-4 minutes , by time got to ER pain was going away. ECG was normal, labs  was normal. Had a little bit SOB with it and , no vomiting, no change in LOC or vision. Was given 3 baby Asa in ER but did not change the pain, pain had went away on its own.     ROS:     CONSTITUTIONAL:  Negative for chills, fatigue, fever, and weight change.      CARDIOVASCULAR:  Positive for chest pain ( none since trip to ER, feeling fine to day ).   Negative for orthopnea, paroxysmal nocturnal dyspnea or pedal edema.      RESPIRATORY:  Negative for dyspnea.      GASTROINTESTINAL:  Negative for abdominal pain, constipation, diarrhea, nausea and vomiting.      NEUROLOGICAL:  Negative for dizziness, headaches, paresthesias, and weakness.      PSYCHIATRIC:  Negative for anxiety, depression, and sleep disturbances.          PMH/FMH/SH:     Last Reviewed on 10/01/2018 04:20 PM by Alex Girard    Past Medical History:             PREVENTIVE HEALTH MAINTENANCE             BONE DENSITY: has never been done will think about it in the future     COLORECTAL CANCER SCREENING: declines colorectal cancer screening, understands reason for testing; never     MAMMOGRAM: declines, understands reason for testing has never been done     PAP SMEAR: was last done S/P OFE with BSO 12/28/2016 No  longer indicated due to age and history         PAST MEDICAL HISTORY             ADVANCE DIRECTIVE Living will and Durable Power of  [facility]         Family History:     Father: Cerebrovascular Accident ( death in his fifties )     Mother: ;  Myocardial Infarction ( 70's )     Son(s): at [facility], is to be placed in nursing home         Social History:     Occupation:    . Retired     Marital Status:      Children: 3 children (ages 3 living 1 passed away )         Tobacco/Alcohol/Supplements:     Last Reviewed on 10/01/2018 04:20 PM by Alex Girard    Tobacco: Current Smoker: She currently smokes every day, 1/2 pack per day.          Alcohol:  Does not drink alcohol and never has.      Caffeine:  She admits to consuming caffeine via coffee ( 2 servings per day ).          Substance Abuse History:     Last Reviewed on 10/01/2018 04:20 PM by Alex Girard        Mental Health History:     Last Reviewed on 10/01/2018 04:20 PM by Alex Girard        Communicable Diseases (eg STDs):     Last Reviewed on 10/01/2018 04:20 PM by Alex Girard            Current Problems:     Last Reviewed on 10/01/2018 04:20 PM by Alex Girard    Overweight     History of tobacco abuse     Type 2 diabetes     Dysthymic disorder     Hypertension     Chest pain     Insomnia         Immunizations:     None        Allergies:     Last Reviewed on 10/01/2018 04:20 PM by Alex Girard    Sudafed:        Current Medications:     Last Reviewed on 10/01/2018 04:20 PM by Alex Girard    Albuterol 90mcg/1actuation Oral Inhaler 2 puff  QID  PRN     Triamcinolone Acetonide 55mcg/1actuation Nasal Spray 1 spray(s) in each nostril BID     Xyzal Allergy 24hr 5mg Tablet Take 1 tablet(s) by mouth each evening         OBJECTIVE:        Vitals:         Current: 10/1/2018 1:33:11 PM    Ht:  5 ft, 2.75 in;  Wt: 171.6 lbs;  BMI: 30.6    T: 98.7 F (oral);  BP: 145/59 mm Hg (left arm,  sitting);  P: 75 bpm (left arm (BP Cuff), sitting);  sCr: 0.77 mg/dL;  GFR: 73.73        Repeat:     2:11:49 PM     BP:   140/74mm Hg (left arm, sitting)         Exams:     PHYSICAL EXAM:     GENERAL: vital signs recorded - well developed, well nourished;  no apparent distress;     EYES: extraocular movements intact; conjunctiva and cornea are normal; PERRLA;     E/N/T:  normal EACs, TMs, nasal/oral mucosa, teeth, gingiva, and oropharynx;     NECK: range of motion is normal; thyroid is non-palpable;     RESPIRATORY: normal respiratory rate and pattern with no distress; normal breath sounds with no rales, rhonchi, wheezes or rubs;     CARDIOVASCULAR: normal rate; rhythm is regular;  no systolic murmur; no edema;     GASTROINTESTINAL: nontender; normal bowel sounds; no organomegaly;     NEUROLOGICAL:  cranial nerves, motor and sensory function, reflexes, gait and coordination are all intact;     PSYCHIATRIC:  appropriate affect and demeanor; normal speech pattern; grossly normal memory;         ASSESSMENT:           786.51   R07.9  Chest pain              DDx:         ORDERS:         Meds Prescribed:       Metoprolol Succinate 25mg Tablets, Extended Release 1 tab PO daily  #30 (Thirty) tablet(s) Refills: 2       Nitroglycerin 0.4mg Tablets, Sublingual SL PRN chest pain May take one X 5 min up to 3, if still with pain go to the ER  #25 (Twenty Five) tablet(s) Refills: 1       Aspirin (ASA) 81mg Chewable Tablet 1 tAB DAILY  #90 (Ninety) tablet(s) Refills: 0         Procedures Ordered:       REFER  Referral to Specialist or Other Facility  (Send-Out)                   PLAN:          Chest pain ECG , labs from ER reviewed. Send copy to Dr. Calderon dmt         REFERRALS:  Referral initiated to a cardiologist ( Dr. Jose Elias Calderon, [facility] ).  CARE PLAN:  This plan was designed by your provider to assist in improving your health and well being.      REMINDERS:    Take your medications and take them  as directed.    Report any new or worsening symptoms to your provider. For any new or recurrent CP go to ER           Prescriptions:       Metoprolol Succinate 25mg Tablets, Extended Release 1 tab PO daily  #30 (Thirty) tablet(s) Refills: 2       Nitroglycerin 0.4mg Tablets, Sublingual SL PRN chest pain May take one X 5 min up to 3, if still with pain go to the ER  #25 (Twenty Five) tablet(s) Refills: 1       Aspirin (ASA) 81mg Chewable Tablet 1 tAB DAILY  #90 (Ninety) tablet(s) Refills: 0           Orders:       REFER  Referral to Specialist or Other Facility  (Send-Out)               CHARGE CAPTURE:           Primary Diagnosis:     786.51 Chest pain            R07.9    Chest pain, unspecified              Orders:          09283   Office/outpatient visit; established patient, level 3  (In-House)               ADDENDUMS:      ____________________________________    Addendum: 10/01/2018 04:39 PM - Glenny Yanes         Visit Note Faxed to:        Jose Elias Calderon  (Cardiology); Number [phone]

## 2021-05-18 NOTE — PROGRESS NOTES
Zaria HowardBecca 1950     Office/Outpatient Visit    Visit Date: Wed, Sep 12, 2018 08:27 am    Provider: Alex Girard N.P. (Assistant: Abida Koehler MA)    Location: [facility]        Electronically signed by Alex Girard N.P. on  09/12/2018 11:24:56 AM                             SUBJECTIVE:        CC:     DEANNE is a 68 year old White female.  This is a follow-up visit.  check up;         HPI:     Patient presents with type 2 diabetes.  It was first diagnosed 6mo ago .  This diagnosis was made during an office visit with another health care provider.  Treatment started at that time includes an 1800 calorie ADA diet.  Diabetes education was initiated and has included handouts and oral instruction from a doctor (regarding a definition of the disease, diabetic diet, treatments, and glycosolated hemoglobin measurement ), last A1C 6.8 down from 7.0,  She denies experiencing any diabetes related symptoms.  Pertinent medical history includes obesity. Watching diet and exercising, has lost about 20pds     ROS:     CONSTITUTIONAL:  Positive for weight loss of about 20 pds.      CARDIOVASCULAR:  Negative for chest pain, orthopnea, paroxysmal nocturnal dyspnea and pedal edema.      RESPIRATORY:  Negative for dyspnea.      GASTROINTESTINAL:  Negative for abdominal pain, constipation, diarrhea, nausea and vomiting.      NEUROLOGICAL:  Negative for dizziness, headaches, paresthesias, and weakness.      ENDOCRINE:  Positive for DM, watching diet and exercising.      PSYCHIATRIC:  Negative for anxiety, depression, and sleep disturbances.          PMH/FMH/SH:     Last Reviewed on 9/12/2018 11:24 AM by Alex Girard    Past Medical History:             PREVENTIVE HEALTH MAINTENANCE             BONE DENSITY: has never been done will think about it in the future     COLORECTAL CANCER SCREENING: declines colorectal cancer screening, understands reason for testing; never     MAMMOGRAM: declines,  understands reason for testing has never been done     PAP SMEAR: was last done S/P OFE with BSO 2016 No longer indicated due to age and history         PAST MEDICAL HISTORY             ADVANCE DIRECTIVE Living will and Durable Power of  [facility]         Family History:     Father: Cerebrovascular Accident ( death in his fifties )     Mother: ;  Myocardial Infarction ( 70's )     Son(s): at [facility], is to be placed in nursing home         Social History:     Occupation:    . Retired     Marital Status:      Children: 3 children (ages 3 living 1 passed away )         Tobacco/Alcohol/Supplements:     Last Reviewed on 2018 11:24 AM by Alex Girard    Tobacco: Current Smoker: She currently smokes every day, 1/2 pack per day.          Alcohol:  Does not drink alcohol and never has.      Caffeine:  She admits to consuming caffeine via coffee ( 2 servings per day ).              Current Problems:     Last Reviewed on 2018 11:24 AM by Alex Girard    Overweight     History of tobacco abuse     Type 2 diabetes     Dysthymic disorder     Hypertension     Insomnia         Immunizations:     None        Allergies:     Last Reviewed on 2018 11:24 AM by Alex Girard    Sudafed:        Current Medications:     Last Reviewed on 2018 11:24 AM by Alex Girard    Albuterol 90mcg/1actuation Oral Inhaler 2 puff  QID  PRN     Triamcinolone Acetonide 55mcg/1actuation Nasal Spray 1 spray(s) in each nostril BID     Xyzal Allergy 24hr 5mg Tablet Take 1 tablet(s) by mouth each evening         OBJECTIVE:        Vitals:         Current: 2018 8:35:53 AM    Ht:  5 ft, 2.75 in;  Wt: 174.6 lbs;  BMI: 31.2    T: 98.3 F (oral);  BP: 137/70 mm Hg (left arm, sitting);  P: 83 bpm (left arm (BP Cuff), sitting);  sCr: 0.78 mg/dL;  GFR: 73.32        Exams:     PHYSICAL EXAM:     GENERAL: vital signs recorded - mildly obese;  no apparent distress;     RESPIRATORY:  normal respiratory rate and pattern with no distress; normal breath sounds with no rales, rhonchi, wheezes or rubs;     CARDIOVASCULAR: normal rate; rhythm is regular;  no systolic murmur; no edema;     GASTROINTESTINAL: nontender; normal bowel sounds; no organomegaly;     NEUROLOGICAL:  cranial nerves, motor and sensory function, reflexes, gait and coordination are all intact;     PSYCHIATRIC:  appropriate affect and demeanor; normal speech pattern; grossly normal memory;         Lab/Test Results:             Hemoglobin A1c:  7.0 (%) (02/20/2018),  6.8 (%) (06/11/2018),             ASSESSMENT:           250.00   E11.9  Type 2 diabetes              DDx:     V15.82   Z87.891  History of tobacco abuse              DDx:     278.02   E66.3  Overweight              DDx:         ORDERS:         Lab Orders:       [id number]  DIAB1 - [facility] LIPID,CMP, A1C: 07780, 78657, 79870  (Send-Out)           Other Orders:       4004F  Pt scrnd tobacco use rcvd tobacco cessation talk  (In-House)           Calculated BMI above the upper parameter and a follow-up plan was documented in the medical record  (In-House)                   PLAN:          Type 2 diabetes     LABORATORY:  Labs ordered to be performed today include Diabetes Panel 1; CMP, Lipid, A1C.      RECOMMENDATIONS given include: Further recommendation to be given after test results are complete.  CARE PLAN:  This plan was designed by your provider to assist in improving your health and well being.      EXERCISE Your provider has outlined the following exercise regiment:    CARDIO at medium intensity (exercise that raises your heart rate to a point where you sweat and feel you're working, yet you're able to carry on a conversation) for 30-45 minutes   three times a week     NUTRITION Your provider advises limiting foods that taste sweet including: soda, candy, cake, pie, jam, canned fruit in syrup and cookies, limiting foods high in carbs like baked goods, low fat packaged  foods, potatoes, pizza, and sugary cereals, Target blood sugars, and staying hydrated: the [place] [employer] determined men need roughly 3 liters (about 13 cups) of total beverages a day. Women need about 2.2 liters (about 9 cups) of total beverages a day.            Orders:       00911  DIAB - [facility] LIPID,CMP, A1C: 12514, 15104, 58633  (Send-Out)             Patient Education Handouts:       Diabetes - Create Your Own Plate - PTC           History of tobacco abuse     MIPS Smoking cessation encouraged. Counseling for less than 3 minutes.            Orders:       4004F  Pt scrnd tobacco use rcvd tobacco cessation talk  (In-House)            Overweight     MIPS     BMI Elevated - Follow-Up Plan: She was provided education on weight loss strategies           Orders:         Calculated BMI above the upper parameter and a follow-up plan was documented in the medical record  (In-House)               CHARGE CAPTURE:           Primary Diagnosis:     250.00 Type 2 diabetes            E11.9    Type 2 diabetes mellitus without complications              Orders:          69193   Office/outpatient visit; established patient, level 4  (In-House)           V15.82 History of tobacco abuse            Z87.891    Personal history of nicotine dependence              Orders:          4004F   Pt scrnd tobacco use rcvd tobacco cessation talk  (In-House)           278.02 Overweight            E66.3    Overweight              Orders:             Calculated BMI above the upper parameter and a follow-up plan was documented in the medical record  (In-House)

## 2021-05-18 NOTE — PROGRESS NOTES
Zaria HowardBecca 1950     Office/Outpatient Visit    Visit Date: Fri, Mar 8, 2019 09:30 am    Provider: Alex Girard N.P. (Assistant: Eliana Reynolds MA)    Location: [facility]        Electronically signed by Alex Girard N.P. on  03/08/2019 10:59:31 AM                             SUBJECTIVE:        CC:     Noris is a 68 year old White female.  Patient is here for right ear ache and blood in her mucus. She also compalins of fluid in both hands and feet.;         HPI:         Noris is here for a Medicare wellness visit.  ADVANCE DIRECTIVES (Please update in PMH): patient has no intrest in creating one ./mlb Returning to annual exam, the required HRA questions are integrated within this visit note. Family medical history and individual medical/surgical history were reviewed and updated.  A current height, weight, BMI, blood pressure, and pulse were recorded in the vitals section of the note and have been reviewed. Patient's medications, including supplements, were recorded in the chart and reviewed.  Current providers and suppliers were reviewed and updated.          Self-Assessment of Health: She rates her health as fair. She rates her confidence of being able to control/manage most of her health problems as very confident. Her physical/emotional health has limited her social activites not at all.  A review of cognitive impairment was performed, including ability to drive a car, manage finances, and any memory changes, and was found to be negative.  A review of functional ability, including bathing, dressing, walking, and urine/bowel continence as well as level of safety was performed and was found to be negative.  Falls Risk: Has not had any falls or only one fall without injury in the past year.  She denies having trouble hearing the TV/radio when others do not, having to strain to hear or understand conversations and wearing hearing aid(s).  Concerning home safety, she  reports that at home she DOES have adequate lighting, a skid resistant shower/tub, functioning smoke alarms and absence of throw rugs, but not grab bars in the bath or handrails on stairs.          Immunization Status: patient declines all vaccinations ./mlb; Physical Activity: She never excercises.; Type of diet patient normally eats is described as diabetic diet Tobacco: Current Smoker: She currently smokes every day, 1-5 cigarettes per day.  Preventative Health updated today         PHQ-9 Depression Screening: Completed form scanned and in chart; Total Score 8 Alcohol Consumption Screening: Completed form scanned and in chart; Total Score 0     ROS:     CONSTITUTIONAL:  Negative for chills, fatigue, fever, and weight change.      EYES:  Positive for recent eye exam wnl.      E/N/T:  Positive for ear pain ( bilateral; stuffy , thinks she may have ear wax stuck in them ).      CARDIOVASCULAR:  Negative for chest pain, orthopnea, paroxysmal nocturnal dyspnea and pedal edema.      RESPIRATORY:  Negative for dyspnea.      GASTROINTESTINAL:  Negative for abdominal pain, constipation, diarrhea, nausea and vomiting.      MUSCULOSKELETAL:  Positive for has occasional swelling in her hands and her feet with stiffness but goes away on its own, discussed arthritis and use of Parafin bath from [place].      NEUROLOGICAL:  Negative for dizziness, headaches, paresthesias, and weakness.      ENDOCRINE:  Positive for DM , not controlled but has not been watching diet, declines DM meds and Chol meds , wants to control with diet.      PSYCHIATRIC:  Negative for anxiety, depression, and sleep disturbances.          PMH/FMH/SH:     Last Reviewed on 3/08/2019 10:57 AM by Alex Girard    Past Medical History:             PREVENTIVE HEALTH MAINTENANCE             BONE DENSITY: has never been done will think about it in the future     COLORECTAL CANCER SCREENING: declines colorectal cancer screening, understands reason for testing;  never     EYE EXAM: Diabetic Eye Exam during this calendar year and results are in chart was last done 2018     MAMMOGRAM: declines, understands reason for testing has never been done     PAP SMEAR: was last done S/P OFE with BSO 2016 No longer indicated due to age and history         PAST MEDICAL HISTORY             ADVANCE DIRECTIVE Living will and Durable Power of  [facility]         Family History:     Father: Cerebrovascular Accident ( death in his fifties )     Mother: ;  Myocardial Infarction ( 70's )     Son(s): at [facility], is to be placed in nursing home         Social History:     Occupation:    . Retired     Marital Status:      Children: 3 children (ages 3 living 1 passed away )         Tobacco/Alcohol/Supplements:     Last Reviewed on 3/08/2019 10:57 AM by Alex Girard    Tobacco: Current Smoker: She currently smokes every day, 1-5 cigarettes per day.          Alcohol:  Does not drink alcohol and never has.      Caffeine:  She admits to consuming caffeine via coffee ( 2 servings per day ).              Current Problems:     Last Reviewed on 3/08/2019 10:57 AM by Alex Girard    Overweight     History of tobacco abuse     Type 2 diabetes     Dysthymic disorder     Hypertension     Screening for depression     Insomnia         Immunizations:     None        Allergies:     Last Reviewed on 3/08/2019 10:57 AM by Alex Girard    Sudafed:        Current Medications:     Last Reviewed on 3/08/2019 10:57 AM by Alex Girard    Metoprolol Succinate 25mg Tablets, Extended Release 1 tab PO daily     Isosorbide Mononitrate 30mg Tablets, Extended Release 1 tab daily     Xyzal Allergy 24hr 5mg Tablet Take 1 tablet(s) by mouth each evening         OBJECTIVE:        Vitals:         Current: 3/8/2019 9:34:25 AM    Ht:  5 ft, 2.75 in;  Wt: 180.6 lbs;  BMI: 32.2    T: 98.6 F (oral);  BP: 151/66 mm Hg (left arm, sitting);  P: 67 bpm (left arm (BP Cuff),  sitting);  sCr: 0.74 mg/dL;  GFR: 78.40    VA: 20/25 OD, 20/30 OS (near, with correction)        Repeat:     10:00:54 AM     BP:   142/85mm Hg (left arm, sitting)         Exams:     PHYSICAL EXAM:     GENERAL: vital signs recorded - well developed, well nourished;  no apparent distress;     E/N/T:  normal EACs, TMs, nasal/oral mucosa, teeth, gingiva, and oropharynx;     NECK: range of motion is normal; thyroid is non-palpable;     RESPIRATORY: normal respiratory rate and pattern with no distress; normal breath sounds with no rales, rhonchi, wheezes or rubs;     CARDIOVASCULAR: normal rate; rhythm is regular;  no systolic murmur; no edema;     GASTROINTESTINAL: nontender; normal bowel sounds; no organomegaly;     NEUROLOGICAL:  cranial nerves, motor and sensory function, reflexes, gait and coordination are all intact;     PSYCHIATRIC:  appropriate affect and demeanor; normal speech pattern; grossly normal memory;         ASSESSMENT:           V70.0   Z00.00  Annual exam              DDx:     V79.0   Z13.89  Screening for depression              DDx:         ORDERS:         Radiology/Test Orders:       2022F  Dilated retinal eye exam w/interpret by ophthalmologist/optometrist documented/reviewed (DM)4  (In-House)           Procedures Ordered:         Annual wellness visit, includes a PPPS, subsequent visit  (In-House)           Other Orders:         Depression screen negative  (In-House)         1101F  Pt screen for fall risk; document no falls in past year or only 1 fall w/o injury in past year (NISAH)  (In-House)         4004F  Pt scrnd tobacco use rcvd tobacco cessation talk  (In-House)           Negative EtOH screen  (In-House)           Calculated BMI above the upper parameter and a follow-up plan was documented in the medical record  (In-House)                   PLAN:          Annual exam    MIPS Has had no falls in the past year Vaccines Flu and Pneumonia updated in Shot record    DIABETIC EYE  EXAM: Diabetic Eye Exam during this calendar year and results are in chart   Smoking cessation encouraged. Counseling for less than 3 minutes.  Negative alcohol screen     BMI Elevated - Follow-Up Plan: She was provided education on weight loss strategies           Orders:       1101F  Pt screen for fall risk; document no falls in past year or only 1 fall w/o injury in past year (NISHA)  (In-House)         4004F  Pt scrnd tobacco use rcvd tobacco cessation talk  (In-House)           Negative EtOH screen  (In-House)           Calculated BMI above the upper parameter and a follow-up plan was documented in the medical record  (In-House)         2022F  Dilated retinal eye exam w/interpret by ophthalmologist/optometrist documented/reviewed (DM)4  (In-House)           Annual wellness visit, includes a PPPS, subsequent visit  (In-House)            Screening for depression     MIPS PHQ-9 Depression Screening Completed form scanned and in chart; Total Score 8           Orders:         Depression screen negative  (In-House)               CHARGE CAPTURE:           Primary Diagnosis:     V70.0 Annual exam            Z00.00    Encounter for general adult medical examination without abnormal findings              Orders:          10987   Preventive medicine, established patient, age 65+ years  (In-House)             1101F   Pt screen for fall risk; document no falls in past year or only 1 fall w/o injury in past year (NISHA)  (In-House)             4004F   Pt scrnd tobacco use rcvd tobacco cessation talk  (In-House)                Negative EtOH screen  (In-House)                Calculated BMI above the upper parameter and a follow-up plan was documented in the medical record  (In-House)             2022F   Dilated retinal eye exam w/interpret by ophthalmologist/optometrist documented/reviewed (DM)4  (In-House)                Annual wellness visit, includes a PPPS, subsequent visit  (In-House)            V79.0 Screening for depression            Z13.89    Encounter for screening for other disorder              Orders:             Depression screen negative  (In-House)

## 2021-05-18 NOTE — PROGRESS NOTES
Zaria Howard RE 1950     Office/Outpatient Visit    Visit Date:  08:39 am    Provider: Kath Reynolds N.P. (Assistant: Maureen Garces MA)    Location: [facility]        Electronically signed by Kath Reynolds N.P. on  2019 11:52:05 AM                             SUBJECTIVE:        CC:     Noris is a 68 year old White female.  This is a follow-up visit.  She presents with sneezing, cough  Started 2 weeks ago. Pt has H/O allergies.          HPI:     microscopic hematuria     The symptom began >3 weeks ago.  There are no associated symptoms.  Prior work-up has included urinalysis with 6-10 RBC's.  She is taking 81 mg ASA daily         Dx with uRI; these have been present for the past 2 weeks.  The symptoms include dry cough and sneezing.      ROS:     CONSTITUTIONAL:  Negative for fever.      E/N/T:  Positive for ear pain ( right ).  out of xyzal     GENITOURINARY:  Negative for dysuria.      MUSCULOSKELETAL:  Negative for back pain.          PMH/FMH/SH:     Last Reviewed on 2019 08:53 AM by Kath Reynolds    Past Medical History:             PREVENTIVE HEALTH MAINTENANCE             BONE DENSITY: has never been done will think about it in the future     COLORECTAL CANCER SCREENING: declines colorectal cancer screening, understands reason for testing; never     EYE EXAM: Diabetic Eye Exam during this calendar year and results are in chart was last done 2018     MAMMOGRAM: declines, understands reason for testing has never been done     PAP SMEAR: was last done S/P OFE with BSO 2016 No longer indicated due to age and history         PAST MEDICAL HISTORY             ADVANCE DIRECTIVE Living will and Durable Power of  [facility]         Surgical History:         Hysterectomy: ; and bladder repair;         Family History:     Father: Cerebrovascular Accident ( death in his fifties )     Mother: ;  Myocardial Infarction ( 70's )      Son(s): at [facility], is to be placed in nursing home         Social History:     Occupation:    . Retired     Marital Status:      Children: 3 children (ages 3 living 1 passed away )         Tobacco/Alcohol/Supplements:     Last Reviewed on 4/01/2019 08:42 AM by Maureen Garces    Tobacco: Current Smoker: She currently smokes every day, 1-5 cigarettes per day.          Alcohol:  Does not drink alcohol and never has.      Caffeine:  She admits to consuming caffeine via coffee ( 2 servings per day ).              Immunizations:     None        Allergies:     Last Reviewed on 4/01/2019 08:42 AM by Maureen Garces    Sudafed:        Current Medications:     Last Reviewed on 4/01/2019 08:43 AM by Maureen Garces    Metoprolol Succinate 25mg Tablets, Extended Release 1 tab PO daily     Isosorbide Mononitrate 30mg Tablets, Extended Release 1 tab daily     Xyzal Allergy 24hr 5mg Tablet Take 1 tablet(s) by mouth each evening /out of her rx     Aspirin (ASA) 81mg Chewable Tablet 1 a day     Nitroglycerin 0.4mg Tablets, Sublingual AS DIRECTED PRN         OBJECTIVE:        Vitals:         Current: 4/1/2019 8:47:03 AM    Ht:  5 ft, 2.75 in;  Wt: 180.6 lbs;  BMI: 32.2    T: 97.8 F (oral);  BP: 148/56 mm Hg (right arm, sitting);  P: 69 bpm (right arm (BP Cuff), sitting);  sCr: 0.74 mg/dL;  GFR: 78.40    O2 Sat: 97 % (room air)        Exams:     PHYSICAL EXAM:     GENERAL:  well developed and nourished; appropriately groomed; in no apparent distress;     E/N/T: EARS:  normal external auditory canals and tympanic membranes;  grossly normal hearing; NOSE:  normal nasal mucosa, septum, turbinates, and sinuses; OROPHARYNX:  normal mucosa, dentition, gingiva, and posterior pharynx;     RESPIRATORY: normal respiratory rate and pattern with no distress; normal breath sounds with no rales, rhonchi, wheezes or rubs;     CARDIOVASCULAR: normal rate; rhythm is regular;  no systolic murmur;     LYMPHATIC: no enlargement  of cervical or facial nodes;     MUSCULOSKELETAL: No CVA tenderness         ASSESSMENT           599.72   R31.9  Microscopic hematuria              DDx:     477.0   J30.2  Allergies              DDx:         ORDERS:         Lab Orders:       [id number]  BDUAM - [facility] Urinalysis, automated, with micro  (Send-Out)         69328  URCU - [facility] Urine Culture  (Send-Out)                   PLAN:          Microscopic hematuria now 2-5 RBC's, was 6-10, will check a urine culture     LABORATORY:  Labs ordered to be performed today include urinalysis with micro and Urine culture.            Orders:       12185  BDUAM - [facility] Urinalysis, automated, with micro  (Send-Out)         58857  URCU - [facility] Urine Culture  (Send-Out)            Allergies restart her xyzal         FOLLOW-UP: Advised to call if there is no improvement in 1 week(s).            Patient Education Handouts:       Allergies              CHARGE CAPTURE           **Please note: ICD descriptions below are intended for billing purposes only and may not represent clinical diagnoses**        Primary Diagnosis:         599.72 Microscopic hematuria            R31.9    Hematuria, unspecified              Orders:          34017   Office/outpatient visit; established patient, level 3  (In-House)           477.0 Allergies            J30.2    Other seasonal allergic rhinitis

## 2021-05-18 NOTE — PROGRESS NOTES
Zaria Howard  1950     Office/Outpatient Visit    Visit Date: Tue, Mar 10, 2020 08:21 am    Provider: Alex Girard N.P. (Assistant: Abida Koehler MA)    Location: [facility]        Electronically signed by Alex Girard N.P. on  03/10/2020 08:53:44 AM                             Subjective:        CC: Medicare wellness    HPI:           Noris is here for a Medicare wellness visit.  The required HRA questions are integrated within this visit note. Family medical history and individual medical/surgical history were reviewed and updated.  A current height, weight, BMI, blood pressure, and pulse were recorded in the vitals section of the note and have been reviewed. Patient's medications, including supplements, were recorded in the chart and reviewed.  Current providers and suppliers were reviewed and updated.          Self-Assessment of Health: She rates her health as very good. She rates her confidence of being able to control/manage most of her health problems as somewhat confident. Her physical/emotional health has limited her social activites not at all.  A review of cognitive impairment was performed, including ability to drive a car, manage finances, and any memory changes, and was found to be negative.  A review of functional ability, including bathing, dressing, walking, and urine/bowel continence as well as level of safety was performed and was found to be negative.  Falls Risk: Has not had any falls or only one fall without injury in the past year.  In regard to hearing, she reports having trouble hearing the TV/radio when others do not and having to strain to hear or understand conversations, but not wearing hearing aid(s).  Concerning home safety, she reports that at home she DOES have adequate lighting, a skid resistant shower/tub, functioning smoke alarms and absence of throw rugs, but not grab bars in the bath.          Immunization Status: ** Has not  received pneumococcal vaccination; ** Has not received influenza vaccine for this season; ** Has not received Prevnar 13 vaccination; Physical Activity: She never excercises.; Type of diet patient normally eats is described as well-balanced with fruits and vegetables Tobacco: Current Smoker: Currently smokes cigarettes every day.  Preventative Health updated today           PHQ-9 Depression Screening: Completed form scanned and in chart; Total Score 3     ROS:     CONSTITUTIONAL:  Positive for Refuses all vaccines , all screening test.      EYES:  Negative for blurred vision.      CARDIOVASCULAR:  Negative for chest pain, orthopnea, paroxysmal nocturnal dyspnea and pedal edema.      RESPIRATORY:  Negative for dyspnea.      GASTROINTESTINAL:  Negative for abdominal pain, constipation, diarrhea, nausea and vomiting.      NEUROLOGICAL:  Negative for dizziness, headaches, paresthesias, and weakness.      ENDOCRINE:  Positive for DM, does not check BG at home and does not want to.      PSYCHIATRIC:  Negative for anxiety, depression, and sleep disturbances.          Past Medical History / Family History / Social History:         Last Reviewed on 6/01/2019 04:59 PM by Alex Girard    Past Medical History:             PREVENTIVE HEALTH MAINTENANCE             BONE DENSITY: has never been done will think about it in the future     COLORECTAL CANCER SCREENING: declines colorectal cancer screening, understands reason for testing; never     EYE EXAM: Diabetic Eye Exam during this calendar year and results are in chart was last done 8/2019     MAMMOGRAM: declines, understands reason for testing has never been done     PAP SMEAR: was last done S/P OFE with BSO 12/28/2016 No longer indicated due to age and history         PAST MEDICAL HISTORY             ADVANCE DIRECTIVE Living will and Durable Power of  [facility]             PAST MEDICAL HISTORY         ECHO- 5/2019-ECHO , borderline hypertrophy, mild mitral  regurgitation, possible mitral valve prolapse.          Surgical History:         Hysterectomy: ; and bladder repair;         Family History:     Father: Cerebrovascular Accident ( death in his fifties )     Mother: ;  Myocardial Infarction ( 70's )     Son(s): at [facility], is to be placed in nursing home         Social History:     Occupation:    . Retired     Marital Status:      Children: 3 children (ages 3 living 1 passed away )         Tobacco/Alcohol/Supplements:     Last Reviewed on 2019 04:59 PM by Alex Girard    Tobacco: Current Smoker: She currently smokes every day, 1-5 cigarettes per day.          Alcohol:  Does not drink alcohol and never has.      Caffeine:  She admits to consuming caffeine via coffee ( 2 servings per day ).          Substance Abuse History:     Last Reviewed on 2019 04:59 PM by Alex Girard        Mental Health History:     Last Reviewed on 2019 04:59 PM by Alex Girard        Communicable Diseases (eg STDs):     Last Reviewed on 2019 04:59 PM by Alex Girard        Current Problems:     Last Reviewed on 2019 04:59 PM by Alex Girard    Dysthymic disorder    Dysthymic disorder    Other persistent mood [affective] disorders    Essential (primary) hypertension    Hypertension    Insomnia    Insomnia, unspecified    Type 2 diabetes mellitus without complications    Type 2 diabetes    Personal history of nicotine dependence    History of tobacco abuse    Overweight    Overweight    Microscopic hematuria     Hematuria, unspecified    COPD    Chronic obstructive pulmonary disease, unspecified        Immunizations:     None        Allergies:     Last Reviewed on 2019 04:59 PM by Alex Girard    Sudafed:          Current Medications:     Last Reviewed on 2019 04:59 PM by Alex Girard    hydroCHLOROthiazide 25 mg oral tablet [1 tab daily]    Xyzal 5 mg oral tablet [Take 1 tablet(s) by  mouth each evening]    aspirin 81 mg oral tablet,chewable [1 a day]    Nitroglycerin 0.4 mg Sublingual Tablet, Sublingual [AS DIRECTED PRN]    metoprolol succinate 25 mg oral Tablet, Extended Release 24 hr [TAKE 1 TABLET BY MOUTH EVERY DAY]    Isosorbide Mononitrate 30 mg oral Tablet, Extended Release 24 hr [1 tab daily]    Anoro Ellipta 62.5mcg/25mcg per 1blister Inhalation Powder [Take 1 inhalation(s) by mouth daily]        Objective:        Vitals:         Current: 3/10/2020 8:33:34 AM    Ht:  5 ft, 2.75 in;  Wt: 177 lbs;  BMI: 31.6T: 98.1 F (oral);  BP: 145/60 mm Hg (left arm, sitting);  P: 84 bpm (left arm (BP Cuff), sitting);  sCr: 0.74 mg/dL;  GFR: 76.70VA: 20/30 OD, 20/30 OS (near, with correction)        Exams:     PHYSICAL EXAM:     GENERAL: vital signs recorded - well developed, well nourished;  no apparent distress;     E/N/T:  normal EACs, TMs, nasal/oral mucosa, teeth, gingiva, and oropharynx;     NECK: range of motion is normal; thyroid is non-palpable; carotid exam is normal with good upstroke and no bruits;     RESPIRATORY: normal respiratory rate and pattern with no distress; normal breath sounds with no rales, rhonchi, wheezes or rubs;     CARDIOVASCULAR: normal rate; rhythm is regular;  no systolic murmur; no edema;     GASTROINTESTINAL: nontender; normal bowel sounds; no organomegaly;     NEUROLOGIC: GROSSLY INTACT     PSYCHIATRIC:  appropriate affect and demeanor; normal speech pattern; grossly normal memory;     Left foot exam    Protective sensation using Monofilament test: NORMAL sensation. Patient detects .07 grams of force which is considered normal.    Vascular status: normal peripheral vascular exam with palpable dorsal pedal and posterior tibal pulses and brisk digital capillary refill    Skin is intact without sores or ulcers    Right foot exam    Protective sensation using Monofilament test: NORMAL sensation. Patient detects .07 grams of force which is considered normal.    Vascular  status: normal peripheral vascular exam with palpable dorsal pedal and posterior tibal pulses and brisk digital capillary refill    Skin is intact without sores or ulcers         Assessment:         Z00.00   Encounter for general adult medical examination without abnormal findings       Z13.31   Encounter for screening for depression       E11.9   Type 2 diabetes mellitus without complications       F17.200   Nicotine dependence, unspecified, uncomplicated           ORDERS:         Lab Orders:       [id number]  DIAB2 - [facility] CMP A1C LIPID AND MICRO ALBUM CR RATIO: [id number]  (Send-Out)            73791  BDUAM - [facility] Urinalysis, automated, with micro  (Send-Out)              Procedures Ordered:         Annual wellness visit, includes a PPPS, subsequent visit  (In-House)              Other Orders:       2028F  Foot examination performed (includes examination through visual inspection, sensory exam with monofilament, and pulse exam - report when any of the three components are completed) (DM)4  (In-House)            1101F  Pt screen for fall risk; document no falls in past year or only 1 fall w/o injury in past year (NISHA)  (In-House)              Depression screen negative  (In-House)                      Plan:         Encounter for general adult medical examination without abnormal findingsDeclines being seen but once a year unless she needs something. dmt    MIPS Has had no falls or only one fall without injury in the past year Vaccines Flu and Pneumonia updated in Shot record ADVANCE DIRECTIVES (Please update in PMH): Living will           Orders:       1101F  Pt screen for fall risk; document no falls in past year or only 1 fall w/o injury in past year (NISHA)  (In-House)              Annual wellness visit, includes a PPPS, subsequent visit  (In-House)              Encounter for screening for depression    MIPS PHQ-9 Depression Screening: Completed form scanned and in chart; Total Score 3;  Negative Depression Screen           Orders:         Depression screen negative  (In-House)              Type 2 diabetes mellitus without complications    LABORATORY:  Labs ordered to be performed today include Diabetes Panel 2;CMP, A1C, Lipid, Microalbumin:Creatinine Ratio.            Orders:       [id number]  DIAB2 - [facility] CMP A1C LIPID AND MICRO ALBUM CR RATIO: [id number]  (Send-Out)            28936  BDUAM - [facility] Urinalysis, automated, with micro  (Send-Out)            2028F  Foot examination performed (includes examination through visual inspection, sensory exam with monofilament, and pulse exam - report when any of the three components are completed) (DM)4  (In-House)              Nicotine dependence, unspecified, uncomplicated    MIPS Smoking cessation encouraged. Counseling for less than 3 minutes.              Charge Capture:         Primary Diagnosis:     Z00.00  Encounter for general adult medical examination without abnormal findings           Orders:      88423  Preventive medicine, established patient, age 65+ years  (In-House)            1101F  Pt screen for fall risk; document no falls in past year or only 1 fall w/o injury in past year (NISHA)  (In-House)              Annual wellness visit, includes a PPPS, subsequent visit  (In-House)              Z13.31  Encounter for screening for depression           Orders:        Depression screen negative  (In-House)              E11.9  Type 2 diabetes mellitus without complications           Orders:      2028F  Foot examination performed (includes examination through visual inspection, sensory exam with monofilament, and pulse exam - report when any of the three components are completed) (DM)4  (In-House)              F17.200  Nicotine dependence, unspecified, uncomplicated

## 2021-05-18 NOTE — PROGRESS NOTES
Zaria oHward  1950     Office/Outpatient Visit    Visit Date: Tue, Dec 1, 2020 08:56 am    Provider: Alex Girard N.P. (Assistant: Bari Fuentes, )    Location: [facility]        Electronically signed by Alex Girard N.P. on  12/01/2020 09:38:23 AM                             Subjective:        CC: Noris is a 70 year old White female.  This is a follow-up visit.  (not taking anoro ellipta or tamsulosin or norco)        HPI:       Here for 6 mo check up on DM , could not tolerate Metformin at all, BG am 190 , evening 175 -180s currently, checks bid every day. Trying to watch carbs and sugars. Taking Januvia 25mg and Glipizide 2.5 daily. Discussed starting new med , Elieser , pt agreeable. will see what labs look like today. BP stable , Had eye exam yesterday, checks feet daily , no new symptoms, otherwise feeling well    ROS:     CONSTITUTIONAL:  Negative for chills, fatigue, fever, and weight change.      CARDIOVASCULAR:  Positive for Hx HTN ,CAD, High chol.   Negative for chest pain, orthopnea, paroxysmal nocturnal dyspnea or pedal edema.      RESPIRATORY:  Positive for still smoking but cutting down , down to 1 ppd now.   Negative for dyspnea.      GASTROINTESTINAL:  Negative for abdominal pain, constipation, diarrhea, nausea and vomiting.      NEUROLOGICAL:  Negative for dizziness, headaches, paresthesias, and weakness.      ENDOCRINE:  Positive for Hx Dm.      PSYCHIATRIC:  Negative for anxiety, depression, and sleep disturbances.          Past Medical History / Family History / Social History:         Last Reviewed on 12/01/2020 09:22 AM by Alex Girard    Past Medical History:             PREVENTIVE HEALTH MAINTENANCE             BONE DENSITY: has never been done will think about it in the future     COLORECTAL CANCER SCREENING: declines colorectal cancer screening, understands reason for testing; never     EYE EXAM: Diabetic Eye Exam during this  calendar year and results are in chart was last done 2020     MAMMOGRAM: declines, understands reason for testing has never been done     PAP SMEAR: was last done S/P OFE with BSO 2016 No longer indicated due to age and history         PAST MEDICAL HISTORY             ADVANCE DIRECTIVE Living will and Durable Power of  [facility]             PAST MEDICAL HISTORY         ECHO- 2019-ECHO , borderline hypertrophy, mild mitral regurgitation, possible mitral valve prolapse.          Surgical History:         Hysterectomy: ; and bladder repair;         Family History:     Father: Cerebrovascular Accident ( death in his fifties )     Mother: ;  Myocardial Infarction ( 70's )     Son(s): at [facility], is to be placed in nursing home         Social History:     Occupation:    . Retired     Marital Status:      Children: 3 children (ages 3 living 1 passed away )         Tobacco/Alcohol/Supplements:     Last Reviewed on 2020 09:22 AM by Alex Girard    Tobacco: Current Smoker: She currently smokes every day, 1 pack per day.          Alcohol:  Does not drink alcohol and never has.      Caffeine:  She admits to consuming caffeine via coffee ( 2 servings per day ).          Substance Abuse History:     Last Reviewed on 2020 09:22 AM by Alex Girard        Mental Health History:     Last Reviewed on 2020 09:22 AM by Alex Girard        Communicable Diseases (eg STDs):     Last Reviewed on 2020 09:22 AM by Alex Girard        Current Problems:     Last Reviewed on 2020 09:22 AM by Alex Girard    Nicotine dependence, unspecified, uncomplicated    Dysthymic disorder    Other persistent mood [affective] disorders    Essential (primary) hypertension    Insomnia, unspecified    Type 2 diabetes mellitus without complications    Personal history of nicotine dependence    Overweight    Chronic obstructive pulmonary disease,  unspecified    Encounter for general adult medical examination without abnormal findings    Encounter for screening for depression    ST elevation (STEMI) myocardial infarction involving other sites    Intercostal pain    Solitary pulmonary nodule        Immunizations:     None        Allergies:     Last Reviewed on 12/01/2020 09:22 AM by Alex Girard    metFORMIN:   (Adverse Reaction)    Sudafed:          Current Medications:     Last Reviewed on 12/01/2020 09:22 AM by Alex Girard    hydroCHLOROthiazide 25 mg oral tablet [1 tab daily]    Xyzal 5 mg oral tablet [Take 1 tablet(s) by mouth each evening]    aspirin 81 mg oral tablet,chewable [1 a day]    metoprolol succinate 25 mg oral Tablet, Extended Release 24 hr [1T PO QD]    nitroglycerin 0.4 mg Sublingual Tablet, Sublingual [AS DIRECTED PRN]    isosorbide mononitrate 30 mg oral Tablet, Extended Release 24 hr [TAKE 1 TABLET BY MOUTH DAILY]    meclizine 12.5 mg oral tablet [TAKE 2 TABLETS  BY ORAL ROUTE 1 HOUR BEFORE EXPOSURE TO MOTION]    Ventolin HFA 90 mcg/actuation Inhalation HFA Aerosol Inhaler [1 puff inhaled every 4-6 hours prn SOB/wheezing or insurance covered]    atorvastatin 10 mg oral tablet [take 1 tablet (10 mg) by oral route once daily]    glipiZIDE 2.5 mg oral Tablet, Extended Release 24 hr [TAKE 1 TABLET BY MOUTH EVERY DAY WITH BREAKFAST]    blood-glucose meter kit  [use to check blood sugar bid for E11.9]    lancets  [use to check blood sugar bid for E11.9]    blood sugar diagnostic strips [use to check blood sugar bid for E11.9, or insurance approved]    Januvia 25 mg oral tablet [take 1 tablet (25 mg) by oral route once daily]    Zofran 4 mg oral tablet [1 tablet every 6 hrs prn nausea]        Objective:        Vitals:         Current: 12/1/2020 9:04:18 AM    Ht:  5 ft, 2.75 in;  Wt: 155.6 lbs;  BMI: 27.8T: 96.3 F (temporal);  BP: 138/68 mm Hg (left arm, sitting);  P: 73 bpm (left arm (BP Cuff), sitting);  sCr: 0.83 mg/dL;  GFR:  63.87        Exams:     PHYSICAL EXAM:     GENERAL: vital signs recorded - well developed, well nourished;  no apparent distress;     RESPIRATORY: normal respiratory rate and pattern with no distress; normal breath sounds with no rales, rhonchi, wheezes or rubs;     CARDIOVASCULAR: normal rate; rhythm is regular;  no systolic murmur; no edema;     GASTROINTESTINAL: nontender; normal bowel sounds; no organomegaly;     NEUROLOGIC: GROSSLY INTACT     PSYCHIATRIC:  appropriate affect and demeanor; normal speech pattern; grossly normal memory;         Assessment:         E11.9   Type 2 diabetes mellitus without complications       F17.200   Nicotine dependence, unspecified, uncomplicated           ORDERS:         Lab Orders:       [id number]  DIAB2 - [facility] CMP A1C LIPID AND MICRO ALBUM CR RATIO: [id number]  (Send-Out)            16942  BDUAM - [facility] Urinalysis, automated, with micro  (Send-Out)            APPTO  Appointment need  (In-House)                      Plan:         Type 2 diabetes mellitus without complicationsSend copy labs to Dr Acevedo and to pt home     LABORATORY:  Labs ordered to be performed today include Diabetes Panel 2;CMP, A1C, Lipid, Microalbumin:Creatinine Ratio.      FOLLOW-UP: Schedule a follow-up visit in 6 months.:.:for labs in 3 mo           Orders:       [id number]  DIAB2 - [facility] CMP A1C LIPID AND MICRO ALBUM CR RATIO: [id number]  (Send-Out)            19580  BDUAM - [facility] Urinalysis, automated, with micro  (Send-Out)            APPTO  Appointment need  (In-House)              Nicotine dependence, unspecified, uncomplicated    MIPS Smoking cessation encouraged. Counseling for less than 3 minutes.              Patient Recommendations:        For  Type 2 diabetes mellitus without complications:    Schedule a follow-up visit in 6 months.                APPOINTMENT INFORMATION:        Monday Tuesday Wednesday Thursday Friday Saturday Sunday             Time:___________________AM  PM   Date:_____________________             Charge Capture:         Primary Diagnosis:     E11.9  Type 2 diabetes mellitus without complications           Orders:      [id number]  Office/outpatient visit; established patient, level 3  (In-House)            APPTO  Appointment need  (In-House)              F17.200  Nicotine dependence, unspecified, uncomplicated

## 2021-05-18 NOTE — PROGRESS NOTES
"Zaria HowardBecca 1950     Office/Outpatient Visit    Visit Date: Mon, Jun 11, 2018 08:37 am    Provider: Angie Nava MD (Assistant: Kajal Hoover MA)    Location: [facility]        Electronically signed by Angie Nava MD on  06/11/2018 09:57:59 AM                             SUBJECTIVE:        CC:     NORIS is a 68 year old White female.  This is a follow-up visit.  BOODWORK, PT IS FASTING , PT WANTS HER RIGHT EAR LOOKED AT (PT IS NOT TAKING TRAZODONE);         HPI: Noris is a pt of [facility], here today to follow up on labwork.  She was diagnosed with diabetes back in Feb when she had an A1c come back at 7.0.  She actually adamantly declares today that she does not have diabetes.  She tells me the A1c came back high because prior to having it drawn, she had bronchitis with repeated courses of PO steroids.  She is here today to get a repeat A1c, it sounds like.        She needs a refill on her albuterol.  She has been using these ever since her bronchitis over the winter.        She is also having pain in the R ear for the past 3 days.  No drainage.  NO F/C.  She has \"bad allergies.\"  +Rhinorrhea, congestion.  She is on Xyzal.     ROS:     CONSTITUTIONAL:  Negative for fatigue and fever.      EYES:  Negative for blurred vision.      E/N/T:  Positive for ear pain ( right ), diminished hearing ( right ), nasal congestion and frequent rhinorrhea.   Negative for tinnitus.      CARDIOVASCULAR:  Negative for chest pain and palpitations.      RESPIRATORY:  Negative for recent cough and dyspnea.      MUSCULOSKELETAL:  Negative for arthralgias and myalgias.          PMH/FMH/SH:     Last Reviewed on 6/11/2018 09:43 AM by Angie Nava    Past Medical History:             PREVENTIVE HEALTH MAINTENANCE             BONE DENSITY: has never been done will think about it in the future     COLORECTAL CANCER SCREENING: declines colorectal cancer screening, understands reason for testing; never "     MAMMOGRAM: declines, understands reason for testing has never been done     PAP SMEAR: was last done S/P [facility] with BSO 2016 No longer indicated due to age and history         PAST MEDICAL HISTORY             ADVANCE DIRECTIVE Living will and Durable Power of  [facility]         Family History:     Father: Cerebrovascular Accident ( death in his fifties )     Mother: ;  Myocardial Infarction ( 70's )     Son(s): at [facility], is to be placed in nursing home         Social History:     Occupation:    . Retired     Marital Status:      Children: 3 children (ages 3 living 1 passed away )         Tobacco/Alcohol/Supplements:     Last Reviewed on 2018 09:43 AM by Angie Nava    Tobacco: Current Smoker: She currently smokes every day, 1/2 pack per day.          Alcohol:  Does not drink alcohol and never has.      Caffeine:  She admits to consuming caffeine via coffee ( 2 servings per day ).          Substance Abuse History:     Last Reviewed on 2018 09:43 AM by Angie Nava        Mental Health History:     Last Reviewed on 2018 09:43 AM by Angie Nava        Communicable Diseases (eg STDs):     Last Reviewed on 2018 09:43 AM by Angie Nava            Current Problems:     Last Reviewed on 3/20/2018 09:28 AM by Alex Girard    History of tobacco abuse     Type 2 diabetes     Dysthymic disorder     Hypertension     Insomnia     Pneumonia, NEC     Acute bronchitis         Immunizations:     None        Allergies:     Last Reviewed on 3/20/2018 09:28 AM by Alex Girard    Sudafed:        Current Medications:     Last Reviewed on 3/20/2018 09:28 AM by Alex Girard    Trazodone HCl 50mg Tablet 1 - 2 @[facility]     Albuterol 90mcg/1actuation Oral Inhaler 2 puff  QID  PRN     Zyrtec 10mg Tablet Take 1 tablet(s) by mouth daily         OBJECTIVE:        Vitals:         Current: 2018 8:39:44 AM    Ht:  5 ft, 2.75 in;  Wt: 183.2 lbs;   BMI: 32.7    T: 97.1 F (oral);  BP: 152/76 mm Hg (right arm, sitting);  P: 84 bpm (right arm (BP Cuff), sitting);  sCr: 0.73 mg/dL;  GFR: 79.96        Repeat:     8:46:35 AM     BP:   146/88mm Hg (right arm, sitting)         Exams:     PHYSICAL EXAM:     GENERAL: vital signs recorded - well developed, well nourished;  well groomed;  no apparent distress;     EYES: extraocular movements intact; conjunctiva and cornea are normal; PERRLA;     E/N/T: EARS: external auditory canal normal bilaterally;  left TM is normal and the right TM is has fluid behind it;  OROPHARYNX:  normal mucosa, dentition, gingiva, and posterior pharynx;     RESPIRATORY: normal respiratory rate and pattern with no distress; normal breath sounds with no rales, rhonchi, wheezes or rubs;     CARDIOVASCULAR: normal rate; rhythm is regular;  no systolic murmur; no edema;     LYMPHATIC: no enlargement of cervical or facial nodes;     MUSCULOSKELETAL: normal gait; normal overall tone         ASSESSMENT:           250.00   E11.9  Type 2 diabetes              DDx:     477.9   J30.9  Allergic rhinitis              DDx:         ORDERS:         Meds Prescribed:       Refill of: Albuterol 90mcg/1actuation Oral Inhaler 2 puff  QID  PRN  #1 (One) 8.5 gm inhaler Refills: 1       Triamcinolone Acetonide 55mcg/1actuation Nasal Spray 1 spray(s) in each nostril BID  #1 (One) bottle Refills: 0         Lab Orders:       [id number]  [facility] Comp. Metabolic Panel  (Send-Out)         28[facility]  [facility]  [facility]                   PLAN:          Type 2 diabetes She denies that she has diabetes.  Will repeat A1c today (with a CMP) as it sounds like this was Alex's intention today.  I will have her get back in with Alex if the A1c is persistently high.      LABORATORY:  Labs ordered to be performed today include Comprehensive metabolic panel and HgbA1C.            Orders:       [id number]  [facility] Comp. Metabolic Panel  (Send-Out)         24894  A1[facility] Hemoglobin A1C  (Send-Out)             Patient Education Handouts:       [facility] [place] [facility] according to the [facility] Medication Compliance           Allergic rhinitis Albuterol refilled.  She has fluid on the R ear which it sounds like is typical with her allergies.  She tells me Flonase did not work for her.  Nasocort rx sent to be used BID for the next 2 weeks.  Continue Xyzal.           Prescriptions:       Refill of: Albuterol 90mcg/1actuation Oral Inhaler 2 puff  QID  PRN  #1 (One) 8.5 gm inhaler Refills: 1       Triamcinolone Acetonide 55mcg/1actuation Nasal Spray 1 spray(s) in each nostril BID  #1 (One) bottle Refills: 0             CHARGE CAPTURE:           Primary Diagnosis:     250.00 Type 2 diabetes            E11.9    Type 2 diabetes mellitus without complications              Orders:          [id number]   Office/outpatient visit; established patient, level 4  (In-House)           477.9 Allergic rhinitis            J30.9    Allergic rhinitis, unspecified

## 2021-05-18 NOTE — PROGRESS NOTES
Zaria Howard RE 1950     Office/Outpatient Visit    Visit Date:  01:38 pm    Provider: Karoline Harman N.P. (Assistant: Jossy Amin MA)    Location: [facility]        Electronically signed by Karoline Harman N.P. on  2018 12:55:31 PM                             SUBJECTIVE:        CC:     DEANNE is a 67 year old White female.  This is a follow-up visit.          HPI:         Complaint of pneumonia, NEC..  The symptom began several weeks ago.  The severity is of mild intensity.  was recently diagnosed with pneumonia, is in today for follwo up with continued cough and now with new onset ear pain completed antibiotics a week ago     ROS:     CONSTITUTIONAL:  Negative for chills, fatigue, fever, and weight change.      E/N/T:  Positive for ear pain ( bilateral ).      CARDIOVASCULAR:  Negative for chest pain, orthopnea, paroxysmal nocturnal dyspnea and pedal edema.      RESPIRATORY:  Positive for recent cough.   Negative for dyspnea, pleuritic chest pain or frequent wheezing.      GASTROINTESTINAL:  Negative for abdominal pain, constipation, diarrhea, nausea and vomiting.          PMH/FMH/SH:     Last Reviewed on 2017 11:03 AM by Marilou Collins    Family History:     Father: Cerebrovascular Accident ( death in his fifties )     Mother: ;  Myocardial Infarction ( 70's )     Son(s): at [facility], is to be placed in nursing home         Social History:     Occupation:    . Retired     Marital Status:      Children: 4 children         Tobacco/Alcohol/Supplements:     Last Reviewed on 2017 11:31 AM by Tammy Mcnamara    Tobacco: Current Smoker: She currently smokes every day, 1/2 pack per day.          Alcohol:  Does not drink alcohol and never has.      Caffeine:  She admits to consuming caffeine via coffee ( 2 servings per day ).          Substance Abuse History:     Last Reviewed on 2017 11:03 AM by Marilou Collins        Mental  Health History:     Last Reviewed on 2/03/2017 11:03 AM by Marilou Collins        Communicable Diseases (eg STDs):     Last Reviewed on 2/03/2017 11:03 AM by Marilou Collins            Current Problems:     Last Reviewed on 2/03/2017 11:03 AM by Marilou Collins    Dysthymic disorder     Hypertension     Cigarette smoking     Acute bronchitis         Immunizations:     None        Allergies:     Last Reviewed on 1/22/2018 01:43 PM by Jossy Amin    Sudafed:        Current Medications:     Last Reviewed on 1/22/2018 01:43 PM by Jossy Amin    Albuterol 90mcg/1actuation Oral Inhaler 2 puff  QID  PRN     Guiatuss-DM  10mg/100mg per 5ml Oral Liquid Take 2 teaspoon by mouth q4h prn for cough         OBJECTIVE:        Vitals:         Current: 1/22/2018 1:42:14 PM    Ht:  5 ft, 2.75 in;  Wt: 186.4 lbs;  BMI: 33.3    T: 98.2 F (oral);  BP: 154/67 mm Hg (left arm, sitting);  P: 82 bpm (left arm (BP Cuff), sitting)        Exams:     PHYSICAL EXAM:     GENERAL: vital signs recorded - well developed, well nourished;  no apparent distress;     E/N/T: EARS: the right TM is bulging and red;  OROPHARYNX:  normal mucosa, dentition, gingiva, and posterior pharynx;     NECK: range of motion is normal; thyroid is non-palpable;     RESPIRATORY: normal respiratory rate and pattern with no distress; normal breath sounds with no rales, rhonchi, wheezes or rubs;     CARDIOVASCULAR: normal rate; rhythm is regular;  no systolic murmur; no edema;     LYMPHATIC: no enlargement of cervical or facial nodes;     MUSCULOSKELETAL: normal gait; normal range of motion of all major muscle groups; no limb or joint pain with range of motion;     NEUROLOGICAL:  cranial nerves, motor and sensory function, reflexes, gait and coordination are all intact;     PSYCHIATRIC:  appropriate affect and demeanor; normal speech pattern; grossly normal memory;         ASSESSMENT:           483.8   J16.8  Pneumonia, NEC              DDx:     382.00    H66.91  R otitis media              DDx:         ORDERS:         Meds Prescribed:       Amoxicillin 500mg Capsules 1 tab tid x 10 days  #30 (Thirty) capsule(s) Refills: 0                 PLAN:          Pneumonia, NEC         RECOMMENDATIONS given include: OTC robitussin - follow up with repeat CXR as previously discussed.      FOLLOW-UP: Keep currently scheduled appointment..           R otitis media           Prescriptions:       Amoxicillin 500mg Capsules 1 tab tid x 10 days  #30 (Thirty) capsule(s) Refills: 0             Patient Recommendations:        For  Pneumonia, NEC:     I also recommend OTC robitussin - follow up with repeat CXR as previously discussed.                  APPOINTMENT INFORMATION:        Monday Tuesday Wednesday Thursday Friday Saturday Sunday            Time:___________________AM  PM   Date:_____________________             CHARGE CAPTURE:           Primary Diagnosis:     483.8 Pneumonia, NEC            J16.8    Pneumonia due to other specified infectious organisms              Orders:          [id number]   Office/outpatient visit; established patient, level 3  (In-House)           382.00 R otitis media            H66.91    Otitis media, unspecified, right ear

## 2021-05-18 NOTE — PROGRESS NOTES
Zaria HowardBecca 1950     Office/Outpatient Visit    Visit Date: Tue, Feb 20, 2018 09:20 am    Provider: Alex Girard N.P. (Assistant: Tammy Mcnamara MA)    Location: [facility]        Electronically signed by Alex Girard N.P. on  02/20/2018 10:37:49 AM                             SUBJECTIVE:        CC:     DEANNE is a 67 year old White female.  Medicare wellness , right ear pain;         HPI:         DEANNE presents with annual exam.  Her last physical exam was several years ago.  She is status-post hysterectomy.  She is not currently using any form of contraception.  She performs breast self-exams occasionally.    Her last Pap smear hysterectomy.   She has never had a mammogram. She has never had a dexa scan. She has never had a flexible sigmoidoscopy or colonoscopy. A chest x-ray was done in 2/2018, it was abnormal, and scheduled for CT chest today.   Preventative Health updated today.  She is current with her declines all vaccines.  She is not current with pneumococcal or influenza immunization.  Tobacco: Current Smoker: She currently smokes every day, 1/2 to 1 pack per day.  having CT chest for recurrent Pneumonia and nodule on CXR , sheduled today         DEANNE is here for a Medicare wellness visit.  Individual and family medical history was reviewed and updated A list of current providers and suppliers reviewed and updated A current height, weight, BMI, blood pressure, and pulse were recorded in the vitals section of the note and have been reviewed A review of cognitive impairment was performed and was negative.  A review of functional ability and level of safety was performed and was negative In regard to hearing, she reports having trouble hearing the TV/radio when others do not and having to strain to hear or understand conversations, but not wearing hearing aid(s).  Concerning home safety, she reports that at home she DOES have a slippery bath or shower, but not  throw rugs, poor lighting, grab bars in the bath, handrails on stairs or functioning smoke alarms.      Physical Activity: Exercises at least 3 times per week; Has not had any falls or only one fall without injury in the past year.  Advance Care Directive updated today in PMH Tobacco: Current Smoker: She currently smokes every day, 1/2 pack per day.    Preventative Health updated today     PHQ9 today, total score 16 , treated today     ROS:     CONSTITUTIONAL:  Negative for chills, fatigue, fever, and weight change.      EYES:  Negative for blurred vision.      CARDIOVASCULAR:  Negative for chest pain, orthopnea, paroxysmal nocturnal dyspnea and pedal edema.      RESPIRATORY:  Positive for has had Pneumonia several times in the past but declines the vaccine , having CT chest today.   Negative for dyspnea.      GASTROINTESTINAL:  Negative for abdominal pain, constipation, diarrhea, nausea and vomiting.      NEUROLOGICAL:  Negative for dizziness, headaches, paresthesias, and weakness.      PSYCHIATRIC:  Positive for trouble falling asleep and staying asleep for over 1 year.          PM/[facility]/SH:     Last Reviewed on 2018 10:17 AM by Alex Girard    Past Medical History:             PREVENTIVE HEALTH MAINTENANCE             BONE DENSITY: has never been done will think about it in the future     COLORECTAL CANCER SCREENING: declines colorectal cancer screening, understands reason for testing; never     MAMMOGRAM: declines, understands reason for testing has never been done     PAP SMEAR: was last done S/P [facility] with BSO 2016 No longer indicated due to age and history         PAST MEDICAL HISTORY             ADVANCE DIRECTIVE Living will and Durable Power of  [facility]         Family History:     Father: Cerebrovascular Accident ( death in his fifties )     Mother: ;  Myocardial Infarction ( 70's )     Son(s): at [facility], is to be placed in nursing home         Social History:      Occupation:    . Retired     Marital Status:      Children: 3 children (ages 3 living 1 passed away )         Tobacco/Alcohol/Supplements:     Last Reviewed on 2/20/2018 10:17 AM by Alex Girard    Tobacco: Current Smoker: She currently smokes every day, 1/2 pack per day; (start age 31, smoked 2-3 ppd for many years pack-year history).          Alcohol:  Does not drink alcohol and never has.      Caffeine:  She admits to consuming caffeine via coffee ( 2 servings per day ).          Mental Health History:     Last Reviewed on 2/20/2018 10:17 AM by Alex Girard            Current Problems:     Last Reviewed on 2/20/2018 10:17 AM by Alex Girard    Dysthymic disorder     Hypertension     Cigarette smoking     Screening for depression     Insomnia     ETD     R otitis media     Pneumonia, NEC     Acute bronchitis         Immunizations:     None        Allergies:     Last Reviewed on 2/20/2018 10:17 AM by Alex Girard    Sudafed:        Current Medications:     Last Reviewed on 2/20/2018 10:17 AM by Alex Girard    Albuterol 90mcg/1actuation Oral Inhaler 2 puff  QID  PRN         OBJECTIVE:        Vitals:         Current: 2/20/2018 9:23:27 AM    Ht:  5 ft, 2.75 in;  Wt: 185 lbs;  BMI: 33.0    T: 98.7 F (oral);  BP: 143/70 mm Hg (left arm, sitting);  P: 80 bpm (left arm (BP Cuff), sitting)        Exams:     PHYSICAL EXAM:     GENERAL: vital signs recorded - well developed, well nourished;  no apparent distress;     EYES: extraocular movements intact; conjunctiva and cornea are normal; PERRLA;     E/N/T: EARS: the right TM is has fluid behind it and clear;     NECK: range of motion is normal; thyroid is non-palpable;     RESPIRATORY: normal respiratory rate and pattern with no distress; normal breath sounds with no rales, rhonchi, wheezes or rubs;     CARDIOVASCULAR: normal rate; rhythm is regular;  no systolic murmur; no edema;     GASTROINTESTINAL: nontender; normal bowel sounds;  no organomegaly;     NEUROLOGICAL:  cranial nerves, motor and sensory function, reflexes, gait and coordination are all intact;     PSYCHIATRIC:  appropriate affect and demeanor; normal speech pattern; grossly normal memory;         Lab/Test Results:     AUDIO AND VISUAL SCREENING     Vision Testing: Near Right 20/20 with glasses Left 20/20 with glasses Bilateral 20/20 with glasses;         ASSESSMENT:           Adult annual physical    V70.0   Z00.00  Annual exam              DDx:     381.81   H69.81  ETD              DDx:     305.1   F17.200  Cigarette smoking              DDx:     780.52   G47.00  Insomnia              DDx:     V79.0   Z13.89  Screening for depression              DDx:         ORDERS:         Meds Prescribed:       Fluticasone Propionate 50mcg/1actuation Nasal Spray 1 spray each nostil daily  #1 (One) gm Refills: 2       Zyrtec (Cetirizine HCl) 10mg Tablet Take 1 tablet(s) by mouth daily  #30 (Thirty) tablet(s) Refills: 3       Trazodone HCl 50mg Tablet 1 - 2 @\[facility]\""RN  #60 (Sixty) tablet(s) Refills: 1       Nicotine 21mg Transdermal Patch Apply 1 patch(es) to hairless area on upper arms daily Remove after 24 hours for 6 weeks  #45 (Forty Five) patch(es) Refills: 0       Nicotine 14mg Transdermal Patch Apply 1 patch to hairless area on upper arm once daily, change every 24 hours for 6 weeks.  #45 (Forty Five) patch(es) Refills: 0       Nicotine 7mg Transdermal Patch apply 1 patch to hairless area on under arms daily. Remove after 24 hours for 2 weeks.  #14 (Fourteen) patch(es) Refills: 0         Lab Orders:       [id number]  [facility] PHYSICAL: CMP, CBC, TSH, LIPID: [id number], [id number], [id number], [id number]  (Send-Out)           Procedures Ordered:         Annual wellness visit, includes a PPPS, first visit  (In-House)           Other Orders:         Smoking and Tobacco Cessation 3 to 10 minutes  (In-House)           Depression Screen Annual Medicare  (In-House)         [id number]  Behav assmt w/score &  docd/stand instrument  (In-House)           Depression screen positive and follow up plan documented  (In-House)                   PLAN:          Annual exam Refuses Mammogram, Colonoscopy, Dexa for now, and declines Pnuemonia and Prevnar and Shingles vaccine dmt     LABORATORY:  Labs ordered to be performed today include PHYSICAL PANEL; CMP, CBC, TSH, LIPID.            Orders:       [id number]  [facility] PHYSICAL: CMP, CBC, TSH, LIPID: [id number], [id number], [id number], [id number]  (Send-Out)           Annual wellness visit, includes a PPPS, first visit  (In-House)            ETD           Prescriptions:       Fluticasone Propionate 50mcg/1actuation Nasal Spray 1 spray each nostil daily  #1 (One) gm Refills: 2       Zyrtec (Cetirizine HCl) 10mg Tablet Take 1 tablet(s) by mouth daily  #30 (Thirty) tablet(s) Refills: 3          Cigarette smoking         RECOMMENDATIONS given include: Counseled on smoking cessation and advised of the benefits to patient's health if she were to stop smoking. Counseling for 3 to 10 minutes.            Prescriptions: Is down to 1/2 ppd , and will do try the patches dmt       Nicotine 21mg Transdermal Patch Apply 1 patch(es) to hairless area on upper arms daily Remove after 24 hours for 6 weeks  #45 (Forty Five) patch(es) Refills: 0       Nicotine 14mg Transdermal Patch Apply 1 patch to hairless area on upper arm once daily, change every 24 hours for 6 weeks.  #45 (Forty Five) patch(es) Refills: 0       Nicotine 7mg Transdermal Patch apply 1 patch to hairless area on under arms daily. Remove after 24 hours for 2 weeks.  #14 (Fourteen) patch(es) Refills: 0           Orders:         Smoking and Tobacco Cessation 3 to 10 minutes  (In-House)            Insomnia         FOLLOW-UP: Schedule a follow-up visit in 1 month..            Prescriptions:       [facility]  #60 (Sixty) tablet(s) Refills: 1          Screening for depression     MIPS PHQ-9 Depression Screening:  Completed form scanned and in chart; Total Score 16 Positive Depression Screen: Pharmacologic intervention initiated/modified; 1 mo           Orders:         Depression Screen Annual Medicare  (In-House)         72406  Behav assmt w/score & docd/stand instrument  (In-House)           Depression screen positive and follow up plan documented  (In-House)               Patient Recommendations:        For  Cigarette smoking:         Stop smoking.          For  Insomnia:     Schedule a follow-up visit in 1 month.                APPOINTMENT INFORMATION:        Monday Tuesday Wednesday Thursday Friday Saturday Sunday            Time:___________________AM  PM   Date:_____________________             CHARGE CAPTURE:           Primary Diagnosis:     Adult annual physical    V70.0 Annual exam            Z00.00    Encounter for general adult medical examination without abnormal findings              Orders:             Annual wellness visit, includes a PPPS, first visit  (In-House)           381.81 ETD            H69.81    Other specified disorders of Eustachian tube, right ear    305.1 Cigarette smoking            F17.200    Nicotine dependence, unspecified, uncomplicated              Orders:             Smoking and Tobacco Cessation 3 to 10 minutes  (In-House)           780.52 Insomnia            G47.00    Insomnia, unspecified    V79.0 Screening for depression            Z13.89    Encounter for screening for other disorder              Orders:             Depression Screen Annual Medicare  (In-House)             54453   Behav assmt w/score & docd/stand instrument  (In-House)                Depression screen positive and follow up plan documented  (In-House)

## 2021-05-18 NOTE — PROGRESS NOTES
Zaria Howard RE 1950     Office/Outpatient Visit    Visit Date: Tue, Mar 20, 2018 08:27 am    Provider: Alex Girard N.P. (Assistant: Maureen Garces MA)    Location: [facility]        Electronically signed by Alex Girard N.P. on  2018 09:28:54 AM                             SUBJECTIVE:        CC:     DEANNE is a 67 year old White female.  here for blood work;         HPI:         Patient complains of insomnia.  This has been noted for the past 2 months.  Sleep has been disrupted by a delay in initiation of sleep.  On average, she estimates that she gets 5 hours of sleep per night.  Current medications include Trazodone.  sleep improved with trazodone , sleeping all night now and feels much better     ROS:     CONSTITUTIONAL:  Negative for fatigue and fever.      CARDIOVASCULAR:  Negative for chest pain, palpitations, tachycardia, orthopnea, and edema.      RESPIRATORY:  Positive for quit smoking about 2 weeks ago feeling better.   Negative for recent cough, dyspnea or frequent wheezing.      PSYCHIATRIC:  Positive for insomnia; taking Trazodone nightly with good results.          PMH/FMH/SH:     Last Reviewed on 3/20/2018 09:28 AM by Alex Girard    Past Medical History:             PREVENTIVE HEALTH MAINTENANCE             BONE DENSITY: has never been done will think about it in the future     COLORECTAL CANCER SCREENING: declines colorectal cancer screening, understands reason for testing; never     MAMMOGRAM: declines, understands reason for testing has never been done     PAP SMEAR: was last done S/P [facility] with BSO 2016 No longer indicated due to age and history         PAST MEDICAL HISTORY             ADVANCE DIRECTIVE Living will and Durable Power of  [facility]         Family History:     Father: Cerebrovascular Accident ( death in his fifties )     Mother: ;  Myocardial Infarction ( 70's )     Son(s): at [facility], is to be  placed in nursing home         Social History:     Occupation:    . Retired     Marital Status:      Children: 3 children (ages 3 living 1 passed away )         Tobacco/Alcohol/Supplements:     Last Reviewed on 3/20/2018 09:28 AM by Alex Girard    Tobacco: She has a past history of cigarette smoking; quit date:  3/1/18.          Alcohol:  Does not drink alcohol and never has.      Caffeine:  She admits to consuming caffeine via coffee ( 2 servings per day ).          Substance Abuse History:     Last Reviewed on 3/20/2018 09:28 AM by Alex Girard        Mental Health History:     Last Reviewed on 3/20/2018 09:28 AM by Alex Girard        Communicable Diseases (eg STDs):     Last Reviewed on 3/20/2018 09:28 AM by Alex Girard            Current Problems:     Last Reviewed on 3/20/2018 09:28 AM by Alex Girard    History of tobacco abuse     Type 2 diabetes     Dysthymic disorder     Hypertension     Screening for depression     Insomnia     ETD     R otitis media     Pneumonia, NEC     Acute bronchitis         Immunizations:     None        Allergies:     Last Reviewed on 3/20/2018 09:28 AM by Alex Girard    Sudafed:        Current Medications:     Last Reviewed on 3/20/2018 09:28 AM by Alex Girard    Trazodone HCl 50mg Tablet 1 - 2 @[facility]     Albuterol 90mcg/1actuation Oral Inhaler 2 puff  QID  PRN     Fluticasone Propionate 50mcg/1actuation Nasal Spray 1 spray each nostil daily     Nicotine 14mg Transdermal Patch Apply 1 patch to hairless area on upper arm once daily, change every 24 hours for 6 weeks.     Nicotine 21mg Transdermal Patch Apply 1 patch(es) to hairless area on upper arms daily Remove after 24 hours for 6 weeks     Nicotine 7mg Transdermal Patch apply 1 patch to hairless area on under arms daily. Remove after 24 hours for 2 weeks.     Zyrtec 10mg Tablet Take 1 tablet(s) by mouth daily         OBJECTIVE:        Vitals:         Current: 3/20/2018  8:31:27 AM    Ht:  5 ft, 2.75 in;  Wt: 180.9 lbs;  BMI: 32.3    T: 98.2 F (oral);  BP: 145/67 mm Hg (right arm, sitting);  P: 85 bpm (left arm (BP Cuff), sitting);  sCr: 0.73 mg/dL;  GFR: 80.59        Exams:     PHYSICAL EXAM:     GENERAL: vital signs recorded - well developed, well nourished;  well groomed;  no apparent distress;     RESPIRATORY: normal respiratory rate and pattern with no distress; normal breath sounds with no rales, rhonchi, wheezes or rubs;     CARDIOVASCULAR: normal rate; rhythm is regular;  no systolic murmur; no edema;     PSYCHIATRIC:  appropriate affect and demeanor; normal speech pattern; grossly normal memory;         ASSESSMENT           780.52   G47.00  Insomnia              DDx:     V15.82   Z87.891  History of tobacco abuse              DDx:         ORDERS:         Meds Prescribed:       Refill of: Trazodone HCl 50mg Tablet 1 - 2 @[facility]  #60 (Sixty) tablet(s) Refills: 1                 PLAN:          Insomnia         FOLLOW-UP: Schedule a follow-up visit in 2 months..   for f/u labs for DM around mid May Chronic visit follow up           Prescriptions:       Refill of: Trazodone HCl 50mg Tablet 1 - 2 @[facility]  #60 (Sixty) tablet(s) Refills: 1          History of tobacco abuse Congratulations on quitting smoking , keep up the good work dmt             Patient Recommendations:        For  Insomnia:     Schedule a follow-up visit in 2 months.                APPOINTMENT INFORMATION:        Monday Tuesday Wednesday Thursday Friday Saturday Sunday            Time:___________________AM  PM   Date:_____________________             CHARGE CAPTURE           **Please note: ICD descriptions below are intended for billing purposes only and may not represent clinical diagnoses**        Primary Diagnosis:         780.52 Insomnia            G47.00    Insomnia, unspecified              Orders:          49296   Office/outpatient visit; established patient, level 3  (In-House)           V15.82  History of tobacco abuse            Z87.891    Personal history of nicotine dependence

## 2021-06-03 RX ORDER — ASPIRIN 81 MG/1
81 TABLET ORAL DAILY
COMMUNITY

## 2021-06-03 RX ORDER — ISOSORBIDE MONONITRATE 30 MG/1
30 TABLET, EXTENDED RELEASE ORAL DAILY
COMMUNITY
End: 2021-08-16 | Stop reason: SDUPTHER

## 2021-06-03 RX ORDER — LEVOCETIRIZINE DIHYDROCHLORIDE 5 MG/1
5 TABLET, FILM COATED ORAL AS NEEDED
COMMUNITY
End: 2021-06-16

## 2021-06-03 RX ORDER — METOPROLOL SUCCINATE 25 MG/1
25 TABLET, EXTENDED RELEASE ORAL DAILY
COMMUNITY
End: 2021-08-16 | Stop reason: SDUPTHER

## 2021-06-03 RX ORDER — HYDROCHLOROTHIAZIDE 25 MG/1
25 TABLET ORAL DAILY
COMMUNITY
End: 2021-08-16 | Stop reason: SDUPTHER

## 2021-06-16 ENCOUNTER — OFFICE VISIT (OUTPATIENT)
Dept: CARDIOLOGY | Facility: CLINIC | Age: 71
End: 2021-06-16

## 2021-06-16 VITALS
BODY MASS INDEX: 28.7 KG/M2 | DIASTOLIC BLOOD PRESSURE: 60 MMHG | HEIGHT: 63 IN | HEART RATE: 69 BPM | WEIGHT: 162 LBS | SYSTOLIC BLOOD PRESSURE: 141 MMHG

## 2021-06-16 DIAGNOSIS — E78.2 MIXED HYPERLIPIDEMIA: ICD-10-CM

## 2021-06-16 DIAGNOSIS — I10 ESSENTIAL HYPERTENSION: ICD-10-CM

## 2021-06-16 DIAGNOSIS — R07.9 CHEST PAIN, UNSPECIFIED TYPE: Primary | ICD-10-CM

## 2021-06-16 PROCEDURE — 99213 OFFICE O/P EST LOW 20 MIN: CPT | Performed by: INTERNAL MEDICINE

## 2021-06-16 PROCEDURE — 93000 ELECTROCARDIOGRAM COMPLETE: CPT | Performed by: INTERNAL MEDICINE

## 2021-06-16 RX ORDER — MECLIZINE HCL 12.5 MG/1
12.5 TABLET ORAL 3 TIMES DAILY PRN
COMMUNITY
End: 2022-12-28

## 2021-06-16 RX ORDER — ATORVASTATIN CALCIUM 10 MG/1
10 TABLET, FILM COATED ORAL DAILY
COMMUNITY
End: 2021-08-16 | Stop reason: SDUPTHER

## 2021-06-16 RX ORDER — DULAGLUTIDE 0.75 MG/.5ML
INJECTION, SOLUTION SUBCUTANEOUS
COMMUNITY
End: 2022-04-08

## 2021-06-16 RX ORDER — NITROGLYCERIN 0.4 MG/1
0.4 TABLET SUBLINGUAL
COMMUNITY
End: 2022-08-29

## 2021-06-16 RX ORDER — GLIPIZIDE 2.5 MG/1
2.5 TABLET, EXTENDED RELEASE ORAL DAILY
COMMUNITY
End: 2022-03-14

## 2021-06-16 NOTE — PROGRESS NOTES
CARDIOLOGY FOLLOW-UP PROGRESS NOTE        Chief Complaint  Hypertension (9 months f/u and EKG).  Follow-up visit for hypertension, chest pain, hyperlipidemia.    Subjective            Zaria Howard presents to [facility] CARDIOLOGY  History of Present Illness    This is a 71-year-old female with hypertension, diabetes mellitus, who was previously seen and evaluated for chest pain.  Subsequent SPECT stress test showed small apical infarct with makenna-infarct ischemia.  Cardiac authorization was recommended however patient preferred to continue medical management.  Long-acting nitrates were added to his regimen.  Patient reports that he she has not had any chest pain episodes over the past 1 year.  Still taking the isosorbide without problems.  Denies any major shortness of breath or palpitations. She is on Trulicity for diabetes at this time.      Past History:    1) Hypertension; 2) Chest pain with a stress test showing small apical ischemia, opted for medical management; 3) Diabetes mellitus, on oral agents; 4) Hypothyroidism.     Medical History: has a past medical history of Diabetes mellitus (CMS/[facility]), Hyperlipidemia, and Hypertension.     Surgical History: has no past surgical history on file.     Family History: No family history of premature coronary artery disease.    Social History: reports that she has been smoking cigarettes. She has a 10.00 pack-year smoking history. She has never used smokeless tobacco. She reports that she does not drink alcohol and does not use drugs.    Allergies: Sudafed [pseudoephedrine hcl]    Current Outpatient Medications on File Prior to Visit   Medication Sig   • aspirin 81 MG EC tablet Take 81 mg by mouth Daily.   • atorvastatin (LIPITOR) 10 MG tablet Take 10 mg by mouth Daily.   • Dulaglutide (Trulicity) 0.75 MG/0.5ML solution pen-injector Inject  under the skin into the appropriate area as directed.   • glipizide (GLUCOTROL XL) 2.5 MG 24 hr tablet Take  "2.5 mg by mouth Daily.   • hydroCHLOROthiazide (HYDRODIURIL) 25 MG tablet Take 25 mg by mouth Daily.   • isosorbide mononitrate (IMDUR) 30 MG 24 hr tablet Take 30 mg by mouth Daily.   • meclizine (ANTIVERT) 12.5 MG tablet Take 12.5 mg by mouth 3 (Three) Times a Day As Needed for Dizziness.   • metoprolol succinate XL (TOPROL-XL) 25 MG 24 hr tablet Take 25 mg by mouth Daily.   • nitroglycerin (NITROSTAT) 0.4 MG SL tablet Place 0.4 mg under the tongue Every 5 (Five) Minutes As Needed for Chest Pain. Take no more than 3 doses in 15 minutes.   • [DISCONTINUED] levocetirizine (XYZAL) 5 MG tablet Take 5 mg by mouth As Needed.   • [DISCONTINUED] metFORMIN (GLUCOPHAGE) 500 MG tablet Take 500 mg by mouth 2 (Two) Times a Day With Meals.     No current facility-administered medications on file prior to visit.          Review of Systems   Respiratory: Negative for cough, shortness of breath and wheezing.    Cardiovascular: Negative for chest pain, palpitations and leg swelling.   Gastrointestinal: Negative for nausea and vomiting.   Neurological: Negative for dizziness and syncope.        Objective     /60 (BP Location: Left arm, Patient Position: Sitting, Cuff Size: Adult)   Pulse 69   Ht 160 cm (63\")   Wt 73.5 kg (162 lb)   BMI 28.70 kg/m²       Physical Exam  General : Alert, awake, no acute distress  CVS : Regular rate and rhythm, no murmur, rubs or gallops  Lungs: Clear to auscultation bilaterally, no crackles or rhonchi  Abdomen: Soft, nontender, bowel sounds heard in all 4 quadrants  Extremities: Warm, well-perfused, no pedal edema    Result Review :     The following data was reviewed by: Jose Elias Calderon MD on 06/16/2021:    CMP    CMP 9/30/20 12/1/20 3/12/21   Glucose 216 (A) 209 (A) 149 (A)   BUN 17 19 19   Creatinine 0.83 0.82 0.73   Sodium 136 136 137   Potassium 3.7 3.9 3.9   Chloride 95 (A) 98 (A) 98 (A)   Calcium 9.6 9.8 9.5   Albumin 4.3 4.1 4.2   Total Bilirubin 0.44 0.34 0.54   Alkaline Phosphatase " 104 107 88   AST (SGOT) 11 (A) 12 (A) 11 (A)   ALT (SGPT) 13 10 9 (A)   (A) Abnormal value       Comments are available for some flowsheets but are not being displayed.           CBC    CBC 9/30/20 9/30/20 12/1/20 3/12/21 3/12/21    1134 1209  0916 1001   WBC 12.34 (A)   12.95 (A)    RBC 5.79 (A) OCC (2-5) (A) OCC (2-5) (A) 5.60 (A) OCC (2-5) (A)   Hemoglobin 16.4 (A)   15.80    Hematocrit 49.8 (A)   47.8 (A)    MCV 86.0   85.4    MCH 28.3   28.2    MCHC 32.9 (A)   33.1    RDW 13.3   13.3    Platelets 228   276.00    (A) Abnormal value            TSH    TSH 3/12/21   TSH 2.820           Lipid Panel    Lipid Panel 7/8/20 12/1/20 3/12/21   Total Cholesterol 134 140 128   Triglycerides 257 (A) 167 (A) 174 (A)   HDL Cholesterol 33 (A) 40 40   VLDL Cholesterol 51 (A) 33 35   LDL Cholesterol  50 (A) 67 (A) 53 (A)   (A) Abnormal value       Comments are available for some flowsheets but are not being displayed.              Data reviewed: Cardiology studies       ECG 12 Lead    Date/Time: 6/16/2021 10:21 AM  Performed by: Jose Elias Calderon MD  Authorized by: Jose Elias Calderon MD   Comparison: compared with previous ECG from 9/29/2018  Similar to previous ECG  Rhythm: sinus rhythm  Rate: normal  Conduction: conduction normal  Q waves: V1 and V2    ST Segments: ST segments normal  T Waves: T waves normal  QRS axis: normal  Clinical impression comment: Borderline normal EKG, unchanged from before                  Assessment and Plan        Diagnoses and all orders for this visit:    1. Chest pain, unspecified type (Primary)    2. Essential hypertension    3. Mixed hyperlipidemia        Chest pain : Symptoms resolved.  Previous SPECT stress test showed small apical infarct with makenna-infarct ischemia but patient opted for medical management.  Today's EKG is unchanged from 2018.  Continue isosorbide mononitrate 30 mg daily.    Hypertension: Well-controlled, continue current regimen    Hyperlipidemia: LDL at goal, continue  atorvastatin      Follow Up     Return in about 1 year (around 6/16/2022) for Next scheduled follow up.    Patient was given instructions and counseling regarding her condition or for health maintenance advice. Please see specific information pulled into the AVS if appropriate.

## 2021-07-01 VITALS
WEIGHT: 174.6 LBS | DIASTOLIC BLOOD PRESSURE: 70 MMHG | SYSTOLIC BLOOD PRESSURE: 137 MMHG | BODY MASS INDEX: 30.94 KG/M2 | HEART RATE: 83 BPM | TEMPERATURE: 98.3 F | HEIGHT: 63 IN

## 2021-07-01 VITALS
WEIGHT: 180.6 LBS | HEIGHT: 63 IN | BODY MASS INDEX: 32 KG/M2 | TEMPERATURE: 98.6 F | DIASTOLIC BLOOD PRESSURE: 85 MMHG | SYSTOLIC BLOOD PRESSURE: 142 MMHG | HEART RATE: 67 BPM

## 2021-07-01 VITALS
SYSTOLIC BLOOD PRESSURE: 145 MMHG | HEART RATE: 85 BPM | BODY MASS INDEX: 32.05 KG/M2 | DIASTOLIC BLOOD PRESSURE: 67 MMHG | HEIGHT: 63 IN | WEIGHT: 180.9 LBS | TEMPERATURE: 98.2 F

## 2021-07-01 VITALS
DIASTOLIC BLOOD PRESSURE: 66 MMHG | SYSTOLIC BLOOD PRESSURE: 146 MMHG | TEMPERATURE: 98.3 F | BODY MASS INDEX: 31.89 KG/M2 | WEIGHT: 180 LBS | HEIGHT: 63 IN | HEART RATE: 82 BPM

## 2021-07-01 VITALS
HEIGHT: 63 IN | TEMPERATURE: 98.5 F | HEART RATE: 81 BPM | SYSTOLIC BLOOD PRESSURE: 150 MMHG | DIASTOLIC BLOOD PRESSURE: 82 MMHG | BODY MASS INDEX: 32.57 KG/M2 | WEIGHT: 183.8 LBS

## 2021-07-01 VITALS
SYSTOLIC BLOOD PRESSURE: 140 MMHG | WEIGHT: 171.6 LBS | HEIGHT: 63 IN | HEART RATE: 75 BPM | TEMPERATURE: 98.7 F | BODY MASS INDEX: 30.41 KG/M2 | DIASTOLIC BLOOD PRESSURE: 74 MMHG

## 2021-07-01 VITALS
TEMPERATURE: 97.1 F | HEIGHT: 63 IN | WEIGHT: 183.2 LBS | SYSTOLIC BLOOD PRESSURE: 146 MMHG | DIASTOLIC BLOOD PRESSURE: 88 MMHG | HEART RATE: 84 BPM | BODY MASS INDEX: 32.46 KG/M2

## 2021-07-01 VITALS
BODY MASS INDEX: 33.03 KG/M2 | HEART RATE: 82 BPM | DIASTOLIC BLOOD PRESSURE: 67 MMHG | TEMPERATURE: 98.2 F | HEIGHT: 63 IN | WEIGHT: 186.4 LBS | SYSTOLIC BLOOD PRESSURE: 154 MMHG

## 2021-07-01 VITALS
WEIGHT: 185 LBS | DIASTOLIC BLOOD PRESSURE: 70 MMHG | HEART RATE: 80 BPM | TEMPERATURE: 98.7 F | HEIGHT: 63 IN | SYSTOLIC BLOOD PRESSURE: 143 MMHG | BODY MASS INDEX: 32.78 KG/M2

## 2021-07-01 VITALS
BODY MASS INDEX: 31.93 KG/M2 | HEIGHT: 63 IN | SYSTOLIC BLOOD PRESSURE: 163 MMHG | TEMPERATURE: 97.2 F | HEART RATE: 66 BPM | WEIGHT: 180.2 LBS | DIASTOLIC BLOOD PRESSURE: 88 MMHG

## 2021-07-01 VITALS
OXYGEN SATURATION: 97 % | WEIGHT: 180.6 LBS | BODY MASS INDEX: 32 KG/M2 | HEIGHT: 63 IN | SYSTOLIC BLOOD PRESSURE: 148 MMHG | TEMPERATURE: 97.8 F | HEART RATE: 69 BPM | DIASTOLIC BLOOD PRESSURE: 56 MMHG

## 2021-07-02 VITALS
HEART RATE: 73 BPM | SYSTOLIC BLOOD PRESSURE: 138 MMHG | TEMPERATURE: 96.3 F | DIASTOLIC BLOOD PRESSURE: 68 MMHG | WEIGHT: 155.6 LBS | BODY MASS INDEX: 27.57 KG/M2 | HEIGHT: 63 IN

## 2021-07-02 VITALS
WEIGHT: 164.6 LBS | SYSTOLIC BLOOD PRESSURE: 139 MMHG | HEIGHT: 63 IN | TEMPERATURE: 97 F | BODY MASS INDEX: 29.16 KG/M2 | DIASTOLIC BLOOD PRESSURE: 60 MMHG | HEART RATE: 73 BPM

## 2021-07-02 VITALS
HEIGHT: 63 IN | HEART RATE: 84 BPM | SYSTOLIC BLOOD PRESSURE: 145 MMHG | WEIGHT: 177 LBS | DIASTOLIC BLOOD PRESSURE: 60 MMHG | TEMPERATURE: 98.1 F | BODY MASS INDEX: 31.36 KG/M2

## 2021-07-02 VITALS
DIASTOLIC BLOOD PRESSURE: 63 MMHG | HEART RATE: 70 BPM | SYSTOLIC BLOOD PRESSURE: 119 MMHG | BODY MASS INDEX: 28.21 KG/M2 | HEIGHT: 63 IN | TEMPERATURE: 96.8 F | WEIGHT: 159.2 LBS

## 2021-08-05 ENCOUNTER — LAB (OUTPATIENT)
Dept: LAB | Facility: HOSPITAL | Age: 71
End: 2021-08-05

## 2021-08-05 ENCOUNTER — OFFICE VISIT (OUTPATIENT)
Dept: FAMILY MEDICINE CLINIC | Age: 71
End: 2021-08-05

## 2021-08-05 VITALS
DIASTOLIC BLOOD PRESSURE: 55 MMHG | BODY MASS INDEX: 28.67 KG/M2 | HEART RATE: 70 BPM | TEMPERATURE: 97.5 F | SYSTOLIC BLOOD PRESSURE: 135 MMHG | HEIGHT: 63 IN | WEIGHT: 161.8 LBS

## 2021-08-05 DIAGNOSIS — D72.829 LEUKOCYTOSIS, UNSPECIFIED TYPE: ICD-10-CM

## 2021-08-05 DIAGNOSIS — Z11.59 ENCOUNTER FOR SCREENING FOR OTHER VIRAL DISEASES: ICD-10-CM

## 2021-08-05 DIAGNOSIS — E11.65 TYPE 2 DIABETES MELLITUS WITH HYPERGLYCEMIA, WITHOUT LONG-TERM CURRENT USE OF INSULIN (HCC): ICD-10-CM

## 2021-08-05 DIAGNOSIS — E11.65 TYPE 2 DIABETES MELLITUS WITH HYPERGLYCEMIA, WITHOUT LONG-TERM CURRENT USE OF INSULIN (HCC): Primary | ICD-10-CM

## 2021-08-05 DIAGNOSIS — R06.00 DYSPNEA, UNSPECIFIED TYPE: ICD-10-CM

## 2021-08-05 DIAGNOSIS — Z72.0 TOBACCO ABUSE: ICD-10-CM

## 2021-08-05 DIAGNOSIS — I10 ESSENTIAL HYPERTENSION: ICD-10-CM

## 2021-08-05 PROBLEM — I25.2 HISTORY OF MI (MYOCARDIAL INFARCTION): Status: ACTIVE | Noted: 2021-08-05

## 2021-08-05 PROBLEM — E78.5 HYPERLIPIDEMIA: Status: ACTIVE | Noted: 2021-08-05

## 2021-08-05 PROBLEM — R42 VERTIGO: Status: ACTIVE | Noted: 2021-08-05

## 2021-08-05 PROBLEM — I25.2 HISTORY OF MI (MYOCARDIAL INFARCTION): Status: RESOLVED | Noted: 2021-08-05 | Resolved: 2021-08-05

## 2021-08-05 PROBLEM — E78.5 HYPERLIPIDEMIA: Status: RESOLVED | Noted: 2021-08-05 | Resolved: 2021-08-05

## 2021-08-05 LAB
BASOPHILS # BLD AUTO: 0.02 10*3/MM3 (ref 0–0.2)
BASOPHILS NFR BLD AUTO: 0.2 % (ref 0–1.5)
DEPRECATED RDW RBC AUTO: 44.2 FL (ref 37–54)
EOSINOPHIL # BLD AUTO: 0.32 10*3/MM3 (ref 0–0.4)
EOSINOPHIL NFR BLD AUTO: 2.8 % (ref 0.3–6.2)
ERYTHROCYTE [DISTWIDTH] IN BLOOD BY AUTOMATED COUNT: 13.9 % (ref 12.3–15.4)
HBA1C MFR BLD: 7.17 % (ref 4.8–5.6)
HCT VFR BLD AUTO: 49.5 % (ref 34–46.6)
HCV AB SER DONR QL: NORMAL
HGB BLD-MCNC: 16.3 G/DL (ref 12–15.9)
IMM GRANULOCYTES # BLD AUTO: 0.07 10*3/MM3 (ref 0–0.05)
IMM GRANULOCYTES NFR BLD AUTO: 0.6 % (ref 0–0.5)
LYMPHOCYTES # BLD AUTO: 3.51 10*3/MM3 (ref 0.7–3.1)
LYMPHOCYTES NFR BLD AUTO: 30.5 % (ref 19.6–45.3)
MCH RBC QN AUTO: 28.3 PG (ref 26.6–33)
MCHC RBC AUTO-ENTMCNC: 32.9 G/DL (ref 31.5–35.7)
MCV RBC AUTO: 85.9 FL (ref 79–97)
MONOCYTES # BLD AUTO: 0.76 10*3/MM3 (ref 0.1–0.9)
MONOCYTES NFR BLD AUTO: 6.6 % (ref 5–12)
NEUTROPHILS NFR BLD AUTO: 59.3 % (ref 42.7–76)
NEUTROPHILS NFR BLD AUTO: 6.84 10*3/MM3 (ref 1.7–7)
PLATELET # BLD AUTO: 243 10*3/MM3 (ref 140–450)
PMV BLD AUTO: 10.1 FL (ref 6–12)
RBC # BLD AUTO: 5.76 10*6/MM3 (ref 3.77–5.28)
WBC # BLD AUTO: 11.52 10*3/MM3 (ref 3.4–10.8)

## 2021-08-05 PROCEDURE — 36415 COLL VENOUS BLD VENIPUNCTURE: CPT

## 2021-08-05 PROCEDURE — 80053 COMPREHEN METABOLIC PANEL: CPT

## 2021-08-05 PROCEDURE — 80061 LIPID PANEL: CPT

## 2021-08-05 PROCEDURE — 83036 HEMOGLOBIN GLYCOSYLATED A1C: CPT

## 2021-08-05 PROCEDURE — 99214 OFFICE O/P EST MOD 30 MIN: CPT | Performed by: NURSE PRACTITIONER

## 2021-08-05 PROCEDURE — 85025 COMPLETE CBC W/AUTO DIFF WBC: CPT

## 2021-08-05 PROCEDURE — 86803 HEPATITIS C AB TEST: CPT

## 2021-08-05 RX ORDER — ALBUTEROL SULFATE 90 UG/1
2 AEROSOL, METERED RESPIRATORY (INHALATION) EVERY 4 HOURS PRN
Qty: 18 G | Refills: 11 | Status: SHIPPED | OUTPATIENT
Start: 2021-08-05 | End: 2022-03-14

## 2021-08-05 RX ORDER — ALBUTEROL SULFATE 90 UG/1
2 AEROSOL, METERED RESPIRATORY (INHALATION) EVERY 4 HOURS PRN
Status: CANCELLED | OUTPATIENT
Start: 2021-08-05

## 2021-08-05 RX ORDER — BLOOD SUGAR DIAGNOSTIC
1 STRIP MISCELLANEOUS 2 TIMES DAILY
COMMUNITY
Start: 2021-06-16 | End: 2021-10-12 | Stop reason: SDUPTHER

## 2021-08-05 RX ORDER — IBUPROFEN 200 MG
200 TABLET ORAL AS NEEDED
COMMUNITY

## 2021-08-05 NOTE — PROGRESS NOTES
"Chief Complaint  Diabetes (follow up)    Subjective          Zaria Howard presents to [facility] for routine f/u. She has history of hypertension, diabetes and previous MI.  Patient states that she has followed up with cardiologist and is only needing a annual follow-up with him at this point.  Patient is still trying to quit smoking.  She does not want any medicinal help with this.  She states that she is down to 6 cigarettes daily.  She is needing a refill of albuterol.  Previous labs reviewed.  It was noted that her white blood cell count was minimally elevated.  She does not recall being ill.  No acute concerns or issues at this time.          Objective   Vital Signs:   /55 (BP Location: Right arm, Patient Position: Sitting)   Pulse 70   Temp 97.5 °F (36.4 °C) (Oral)   Ht 160 cm (62.99\")   Wt 73.4 kg (161 lb 12.8 oz)   BMI 28.67 kg/m²     Physical Exam  Vitals reviewed.   Constitutional:       Appearance: Normal appearance. She is well-developed.   HENT:      Head: Normocephalic and atraumatic.   Eyes:      Conjunctiva/sclera: Conjunctivae normal.      Pupils: Pupils are equal, round, and reactive to light.   Neck:      Vascular: No carotid bruit.   Cardiovascular:      Rate and Rhythm: Normal rate and regular rhythm.      Heart sounds: Normal heart sounds. No murmur heard.     Pulmonary:      Effort: Pulmonary effort is normal. No respiratory distress.      Breath sounds: Normal breath sounds.   Musculoskeletal:      Cervical back: Full passive range of motion without pain.      Right lower leg: No edema.      Left lower leg: No edema.   Skin:     General: Skin is warm and dry.   Neurological:      Mental Status: She is alert and oriented to person, place, and time.   Psychiatric:         Mood and Affect: Mood and affect normal.         Behavior: Behavior normal.         Thought Content: Thought content normal.         Judgment: Judgment normal.          Result " Review :   The following data was reviewed by: DOLORES Streeter on 08/05/2021: Recent lab and cardio note           Assessment and Plan    Diagnoses and all orders for this visit:    1. Type 2 diabetes mellitus with hyperglycemia, without long-term current use of insulin (CMS/[facility]) (Primary)  -     Comprehensive Metabolic Panel; Future  -     Lipid Panel; Future  -     Hemoglobin A1c; Future    2. Leukocytosis, unspecified type  Comments:  Will notify patient of results and if further evaluation is needed  Orders:  -     CBC & Differential; Future    3. Encounter for screening for other viral diseases  Comments:  Will notify patient of results  Orders:  -     Hepatitis C antibody; Future    4. Essential hypertension  Assessment & Plan:  Continue taking medications as rx. Keep f/u with cardio.       5. Tobacco abuse  Assessment & Plan:  Patient strongly encouraged to consider tobacco cessation.  Potential health complications discussed.  Patient is aware.  She is trying to quit on her own. She is down to 6 per day. Encouraged patient to reach out to the office if she would like any help with quitting. Monitor at f/u.       6. Dyspnea, unspecified type  Assessment & Plan:  Rescue inhaler provided.    Go to ED with any severe shortness of air.      Other orders  -     Cancel: albuterol sulfate  (90 Base) MCG/ACT inhaler; Inhale 2 puffs Every 4 (Four) Hours As Needed for Wheezing.  -     albuterol sulfate  (90 Base) MCG/ACT inhaler; Inhale 2 puffs Every 4 (Four) Hours As Needed for Wheezing or Shortness of Air.  Dispense: 18 g; Refill: 11    Patient to notify office with any acute concerns or issues.  Patient verbalizes understanding, agrees with plan of care and has no further questions upon discharge.    Please note that portions of this note were completed with a voice recognition program.    Follow Up    Return in about 3 months (around 11/5/2021) for Recheck.  Patient was given instructions and  counseling regarding her condition or for health maintenance advice. Please see specific information pulled into the AVS if appropriate.

## 2021-08-05 NOTE — ASSESSMENT & PLAN NOTE
Patient strongly encouraged to consider tobacco cessation.  Potential health complications discussed.  Patient is aware.  She is trying to quit on her own. She is down to 6 per day. Encouraged patient to reach out to the office if she would like any help with quitting. Monitor at f/u.

## 2021-08-06 LAB
ALBUMIN SERPL-MCNC: 3.9 G/DL (ref 3.5–5.2)
ALBUMIN/GLOB SERPL: 1.3 G/DL
ALP SERPL-CCNC: 85 U/L (ref 39–117)
ALT SERPL W P-5'-P-CCNC: 10 U/L (ref 1–33)
ANION GAP SERPL CALCULATED.3IONS-SCNC: 11.9 MMOL/L (ref 5–15)
AST SERPL-CCNC: 13 U/L (ref 1–32)
BILIRUB SERPL-MCNC: 0.4 MG/DL (ref 0–1.2)
BUN SERPL-MCNC: 15 MG/DL (ref 8–23)
BUN/CREAT SERPL: 19.2 (ref 7–25)
CALCIUM SPEC-SCNC: 9.2 MG/DL (ref 8.6–10.5)
CHLORIDE SERPL-SCNC: 101 MMOL/L (ref 98–107)
CHOLEST SERPL-MCNC: 123 MG/DL (ref 0–200)
CO2 SERPL-SCNC: 27.1 MMOL/L (ref 22–29)
CREAT SERPL-MCNC: 0.78 MG/DL (ref 0.57–1)
GFR SERPL CREATININE-BSD FRML MDRD: 73 ML/MIN/1.73
GLOBULIN UR ELPH-MCNC: 3 GM/DL
GLUCOSE SERPL-MCNC: 137 MG/DL (ref 65–99)
HDLC SERPL-MCNC: 37 MG/DL (ref 40–60)
LDLC SERPL CALC-MCNC: 62 MG/DL (ref 0–100)
LDLC/HDLC SERPL: 1.58 {RATIO}
POTASSIUM SERPL-SCNC: 3.9 MMOL/L (ref 3.5–5.2)
PROT SERPL-MCNC: 6.9 G/DL (ref 6–8.5)
SODIUM SERPL-SCNC: 140 MMOL/L (ref 136–145)
TRIGL SERPL-MCNC: 138 MG/DL (ref 0–150)
VLDLC SERPL-MCNC: 24 MG/DL (ref 5–40)

## 2021-08-16 RX ORDER — ATORVASTATIN CALCIUM 10 MG/1
10 TABLET, FILM COATED ORAL DAILY
Qty: 90 TABLET | Refills: 1 | Status: SHIPPED | OUTPATIENT
Start: 2021-08-16 | End: 2022-03-17 | Stop reason: SDUPTHER

## 2021-08-16 RX ORDER — METOPROLOL SUCCINATE 25 MG/1
25 TABLET, EXTENDED RELEASE ORAL DAILY
Qty: 90 TABLET | Refills: 1 | Status: SHIPPED | OUTPATIENT
Start: 2021-08-16 | End: 2022-03-17 | Stop reason: SDUPTHER

## 2021-08-16 RX ORDER — ISOSORBIDE MONONITRATE 30 MG/1
30 TABLET, EXTENDED RELEASE ORAL DAILY
Qty: 90 TABLET | Refills: 1 | Status: SHIPPED | OUTPATIENT
Start: 2021-08-16 | End: 2022-03-17 | Stop reason: SDUPTHER

## 2021-08-16 RX ORDER — HYDROCHLOROTHIAZIDE 25 MG/1
25 TABLET ORAL DAILY
Qty: 90 TABLET | Refills: 1 | Status: SHIPPED | OUTPATIENT
Start: 2021-08-16 | End: 2022-03-17

## 2021-10-12 RX ORDER — BLOOD SUGAR DIAGNOSTIC
1 STRIP MISCELLANEOUS 2 TIMES DAILY
Qty: 300 EACH | Refills: 3 | Status: SHIPPED | OUTPATIENT
Start: 2021-10-12 | End: 2022-01-11 | Stop reason: CLARIF

## 2021-10-12 NOTE — TELEPHONE ENCOUNTER
Caller: Zaria Howard    Relationship: Self      Medication requested (name and dosage): Accu-Chek Guide test strip    Pharmacy where request should be sent:    [id number] - [place], [place] - [address] AT [facility] OF [address] &  - [phone]  - [phone] FX     Additional details provided by patient: PATIENT IS COMPLETELY OUT OF THE STRIPS AND NEED THESE ASAP. PATIENT SAID SHE IS ALMOST OUT OF NEEDLES AND WOULD NEED THEM REFILL AS WELL    Best call back number: [phone]    Does the patient have less than a 3 day supply:  [x] Yes  [] No    Hope Dmitry Anguiano Rep   10/12/21 13:46 EDT

## 2021-11-08 ENCOUNTER — OFFICE VISIT (OUTPATIENT)
Dept: FAMILY MEDICINE CLINIC | Age: 71
End: 2021-11-08

## 2021-11-08 ENCOUNTER — LAB (OUTPATIENT)
Dept: LAB | Facility: HOSPITAL | Age: 71
End: 2021-11-08

## 2021-11-08 VITALS
HEART RATE: 73 BPM | HEIGHT: 63 IN | SYSTOLIC BLOOD PRESSURE: 149 MMHG | TEMPERATURE: 98.3 F | BODY MASS INDEX: 29.2 KG/M2 | DIASTOLIC BLOOD PRESSURE: 64 MMHG | WEIGHT: 164.8 LBS

## 2021-11-08 DIAGNOSIS — I10 ESSENTIAL HYPERTENSION: ICD-10-CM

## 2021-11-08 DIAGNOSIS — E11.65 TYPE 2 DIABETES MELLITUS WITH HYPERGLYCEMIA, WITHOUT LONG-TERM CURRENT USE OF INSULIN (HCC): ICD-10-CM

## 2021-11-08 DIAGNOSIS — E11.65 TYPE 2 DIABETES MELLITUS WITH HYPERGLYCEMIA, WITHOUT LONG-TERM CURRENT USE OF INSULIN (HCC): Primary | ICD-10-CM

## 2021-11-08 DIAGNOSIS — Z72.0 TOBACCO ABUSE: ICD-10-CM

## 2021-11-08 LAB
ALBUMIN SERPL-MCNC: 4.3 G/DL (ref 3.5–5.2)
ALBUMIN/GLOB SERPL: 1.5 G/DL
ALP SERPL-CCNC: 97 U/L (ref 39–117)
ALT SERPL W P-5'-P-CCNC: 14 U/L (ref 1–33)
ANION GAP SERPL CALCULATED.3IONS-SCNC: 8.7 MMOL/L (ref 5–15)
AST SERPL-CCNC: 11 U/L (ref 1–32)
BILIRUB SERPL-MCNC: 0.3 MG/DL (ref 0–1.2)
BUN SERPL-MCNC: 19 MG/DL (ref 8–23)
BUN/CREAT SERPL: 26.4 (ref 7–25)
CALCIUM SPEC-SCNC: 9.5 MG/DL (ref 8.6–10.5)
CHLORIDE SERPL-SCNC: 102 MMOL/L (ref 98–107)
CHOLEST SERPL-MCNC: 148 MG/DL (ref 0–200)
CO2 SERPL-SCNC: 28.3 MMOL/L (ref 22–29)
CREAT SERPL-MCNC: 0.72 MG/DL (ref 0.57–1)
GFR SERPL CREATININE-BSD FRML MDRD: 80 ML/MIN/1.73
GLOBULIN UR ELPH-MCNC: 2.9 GM/DL
GLUCOSE SERPL-MCNC: 162 MG/DL (ref 65–99)
HBA1C MFR BLD: 7.43 % (ref 4.8–5.6)
HDLC SERPL-MCNC: 38 MG/DL (ref 40–60)
LDLC SERPL CALC-MCNC: 82 MG/DL (ref 0–100)
LDLC/HDLC SERPL: 2.05 {RATIO}
POTASSIUM SERPL-SCNC: 3.7 MMOL/L (ref 3.5–5.2)
PROT SERPL-MCNC: 7.2 G/DL (ref 6–8.5)
SODIUM SERPL-SCNC: 139 MMOL/L (ref 136–145)
TRIGL SERPL-MCNC: 161 MG/DL (ref 0–150)
VLDLC SERPL-MCNC: 28 MG/DL (ref 5–40)

## 2021-11-08 PROCEDURE — 83036 HEMOGLOBIN GLYCOSYLATED A1C: CPT

## 2021-11-08 PROCEDURE — 99214 OFFICE O/P EST MOD 30 MIN: CPT | Performed by: NURSE PRACTITIONER

## 2021-11-08 PROCEDURE — 80053 COMPREHEN METABOLIC PANEL: CPT

## 2021-11-08 PROCEDURE — 36415 COLL VENOUS BLD VENIPUNCTURE: CPT

## 2021-11-08 PROCEDURE — 80061 LIPID PANEL: CPT

## 2021-11-08 RX ORDER — LANCETS
EACH MISCELLANEOUS
COMMUNITY
Start: 2021-10-13 | End: 2021-11-29

## 2021-11-08 NOTE — ASSESSMENT & PLAN NOTE
Staff to call patient to see if she had medication this morning.  If she had not, she is to take it when she gets home and take blood pressure and keep a log to bring to next visit.  If she has, we are going to increase metoprolol from 25 mg to 50 mg daily.

## 2021-11-08 NOTE — ASSESSMENT & PLAN NOTE
Patient to continue medications as prescribed.  Patient is close to goal of 7.  Will notify patient of results and adjust medications if needed.

## 2021-11-08 NOTE — PROGRESS NOTES
"Chief Complaint  Hypertension (3 month follow up)    Subjective          Zaria Howard presents to [facility] for routine follow-up.  Patient does not have any acute concerns at this time.  Her last A1c was 7.17 in August. She has plenty of strips and lancets at home.  She does not routinely check her glucose however.  She is taking glipizide and Trulicity for diabetes control.  She is taking metoprolol and hydrochlorothiazide for hypertension.  She states she is compliant with medications.  She is down from 2 packs/day to half a pack per day.  She declines any medications to help her quit smoking she did not want to add on any medication at this time.  She states that she has used Wellbutrin in the past and it has not helped.          Objective   Vital Signs:   /64 (BP Location: Right arm, Patient Position: Sitting)   Pulse 73   Temp 98.3 °F (36.8 °C) (Oral)   Ht 160 cm (62.99\")   Wt 74.8 kg (164 lb 12.8 oz)   BMI 29.20 kg/m²     Physical Exam  Vitals reviewed.   Constitutional:       Appearance: Normal appearance. She is well-developed and normal weight. She is not ill-appearing.   HENT:      Head: Normocephalic and atraumatic.   Eyes:      Conjunctiva/sclera: Conjunctivae normal.      Pupils: Pupils are equal, round, and reactive to light.   Neck:      Vascular: No carotid bruit.   Cardiovascular:      Rate and Rhythm: Normal rate and regular rhythm.      Heart sounds: Normal heart sounds. No murmur heard.      Pulmonary:      Effort: Pulmonary effort is normal. No respiratory distress.      Breath sounds: Normal breath sounds. No wheezing, rhonchi or rales.   Musculoskeletal:      Cervical back: Full passive range of motion without pain.      Right lower leg: No edema.      Left lower leg: No edema.   Skin:     General: Skin is warm and dry.   Neurological:      Mental Status: She is alert and oriented to person, place, and time.   Psychiatric:         Mood and " Affect: Mood and affect normal.         Behavior: Behavior normal.         Thought Content: Thought content normal.         Judgment: Judgment normal.          Result Review :   The following data was reviewed by: DOLORES Streeter on 11/08/2021:  Hepatitis C antibody (08/05/2021 09:54)  CBC & Differential (08/05/2021 09:54)  Hemoglobin A1c (08/05/2021 09:54)  Lipid Panel (08/05/2021 09:54)  Comprehensive Metabolic Panel (08/05/2021 09:54)           Assessment and Plan    Diagnoses and all orders for this visit:    1. Type 2 diabetes mellitus with hyperglycemia, without long-term current use of insulin (HCC) (Primary)  Assessment & Plan:  Patient to continue medications as prescribed.  Patient is close to goal of 7.  Will notify patient of results and adjust medications if needed.    Orders:  -     Comprehensive Metabolic Panel; Future  -     Lipid Panel; Future  -     Hemoglobin A1c; Future    2. Tobacco abuse  Comments:  1/2 ppd down from 2 ppd  Assessment & Plan:  Patient strongly encouraged to consider tobacco cessation.  Potential health complications discussed.  Patient is aware. She is trying to quit on her own.  Encouraged patient to reach out to the office if she would like any help with quitting.  Monitor at follow-up.      3. Essential hypertension  Assessment & Plan:  Staff to call patient to see if she had medication this morning.  If she had not, she is to take it when she gets home and take blood pressure and keep a log to bring to next visit.  If she has, we are going to increase metoprolol from 25 mg to 50 mg daily.       Other orders  -     Cancel: CBC & Differential; Future  Patient to notify office with any acute concerns or issues.  Patient verbalizes understanding, agrees with plan of care and has no further questions upon discharge.    Please note that portions of this note were completed with a voice recognition program.    Follow Up    Return in about 3 months (around 2/8/2022) for  Recheck.  Patient was given instructions and counseling regarding her condition or for health maintenance advice. Please see specific information pulled into the AVS if appropriate.

## 2021-11-08 NOTE — ASSESSMENT & PLAN NOTE
Patient strongly encouraged to consider tobacco cessation.  Potential health complications discussed.  Patient is aware. She is trying to quit on her own.  Encouraged patient to reach out to the office if she would like any help with quitting.  Monitor at follow-up.

## 2021-11-29 RX ORDER — LANCETS
EACH MISCELLANEOUS
Qty: 102 EACH | Refills: 5 | Status: SHIPPED | OUTPATIENT
Start: 2021-11-29

## 2021-12-29 ENCOUNTER — TELEPHONE (OUTPATIENT)
Dept: FAMILY MEDICINE CLINIC | Age: 71
End: 2021-12-29

## 2021-12-29 NOTE — TELEPHONE ENCOUNTER
Provider: MADISYN PARISI  Caller: DUGLAS  Relationship to Patient: [facility]  Phone Number: [phone]  Reason for Call: PLEASE FAX A1C RESULTS -[phone]

## 2022-01-11 RX ORDER — BLOOD-GLUCOSE METER
1 KIT MISCELLANEOUS 2 TIMES DAILY
Qty: 1 EACH | Refills: 0 | Status: SHIPPED | OUTPATIENT
Start: 2022-01-11

## 2022-03-14 ENCOUNTER — LAB (OUTPATIENT)
Dept: LAB | Facility: HOSPITAL | Age: 72
End: 2022-03-14

## 2022-03-14 ENCOUNTER — OFFICE VISIT (OUTPATIENT)
Dept: FAMILY MEDICINE CLINIC | Age: 72
End: 2022-03-14

## 2022-03-14 VITALS
BODY MASS INDEX: 28.92 KG/M2 | SYSTOLIC BLOOD PRESSURE: 130 MMHG | HEART RATE: 74 BPM | WEIGHT: 163.2 LBS | TEMPERATURE: 98.1 F | DIASTOLIC BLOOD PRESSURE: 66 MMHG | HEIGHT: 63 IN

## 2022-03-14 DIAGNOSIS — Z72.0 TOBACCO ABUSE: ICD-10-CM

## 2022-03-14 DIAGNOSIS — E11.65 TYPE 2 DIABETES MELLITUS WITH HYPERGLYCEMIA, WITHOUT LONG-TERM CURRENT USE OF INSULIN: ICD-10-CM

## 2022-03-14 DIAGNOSIS — Z00.00 MEDICARE ANNUAL WELLNESS VISIT, SUBSEQUENT: Primary | ICD-10-CM

## 2022-03-14 DIAGNOSIS — I10 ESSENTIAL HYPERTENSION: ICD-10-CM

## 2022-03-14 LAB
ALBUMIN SERPL-MCNC: 4.3 G/DL (ref 3.5–5.2)
ALBUMIN UR-MCNC: <1.2 MG/DL
ALBUMIN/GLOB SERPL: 1.5 G/DL
ALP SERPL-CCNC: 84 U/L (ref 39–117)
ALT SERPL W P-5'-P-CCNC: 10 U/L (ref 1–33)
ANION GAP SERPL CALCULATED.3IONS-SCNC: 11 MMOL/L (ref 5–15)
AST SERPL-CCNC: 12 U/L (ref 1–32)
BILIRUB SERPL-MCNC: 0.5 MG/DL (ref 0–1.2)
BUN SERPL-MCNC: 20 MG/DL (ref 8–23)
BUN/CREAT SERPL: 27.8 (ref 7–25)
CALCIUM SPEC-SCNC: 9.5 MG/DL (ref 8.6–10.5)
CHLORIDE SERPL-SCNC: 100 MMOL/L (ref 98–107)
CHOLEST SERPL-MCNC: 130 MG/DL (ref 0–200)
CO2 SERPL-SCNC: 27 MMOL/L (ref 22–29)
CREAT SERPL-MCNC: 0.72 MG/DL (ref 0.57–1)
CREAT UR-MCNC: 110.9 MG/DL
EGFRCR SERPLBLD CKD-EPI 2021: 90.1 ML/MIN/1.73
GLOBULIN UR ELPH-MCNC: 2.8 GM/DL
GLUCOSE SERPL-MCNC: 152 MG/DL (ref 65–99)
HBA1C MFR BLD: 7.7 % (ref 4.8–5.6)
HDLC SERPL-MCNC: 39 MG/DL (ref 40–60)
LDLC SERPL CALC-MCNC: 63 MG/DL (ref 0–100)
LDLC/HDLC SERPL: 1.49 {RATIO}
MICROALBUMIN/CREAT UR: NORMAL MG/G{CREAT}
POTASSIUM SERPL-SCNC: 3.6 MMOL/L (ref 3.5–5.2)
PROT SERPL-MCNC: 7.1 G/DL (ref 6–8.5)
SODIUM SERPL-SCNC: 138 MMOL/L (ref 136–145)
TRIGL SERPL-MCNC: 164 MG/DL (ref 0–150)
VLDLC SERPL-MCNC: 28 MG/DL (ref 5–40)

## 2022-03-14 PROCEDURE — 1170F FXNL STATUS ASSESSED: CPT | Performed by: NURSE PRACTITIONER

## 2022-03-14 PROCEDURE — 82043 UR ALBUMIN QUANTITATIVE: CPT

## 2022-03-14 PROCEDURE — 80061 LIPID PANEL: CPT

## 2022-03-14 PROCEDURE — 36415 COLL VENOUS BLD VENIPUNCTURE: CPT

## 2022-03-14 PROCEDURE — 99214 OFFICE O/P EST MOD 30 MIN: CPT | Performed by: NURSE PRACTITIONER

## 2022-03-14 PROCEDURE — 80053 COMPREHEN METABOLIC PANEL: CPT

## 2022-03-14 PROCEDURE — 1126F AMNT PAIN NOTED NONE PRSNT: CPT | Performed by: NURSE PRACTITIONER

## 2022-03-14 PROCEDURE — 82570 ASSAY OF URINE CREATININE: CPT

## 2022-03-14 PROCEDURE — 83036 HEMOGLOBIN GLYCOSYLATED A1C: CPT

## 2022-03-14 PROCEDURE — G0439 PPPS, SUBSEQ VISIT: HCPCS | Performed by: NURSE PRACTITIONER

## 2022-03-14 PROCEDURE — 1159F MED LIST DOCD IN RCRD: CPT | Performed by: NURSE PRACTITIONER

## 2022-03-14 NOTE — PROGRESS NOTES
The ABCs of the Annual Wellness Visit  Subsequent Medicare Wellness Visit    Chief Complaint   Patient presents with   • Medicare Wellness-subsequent      Subjective    History of Present Illness:  Zaria Howard is a 71 y.o. female who presents for a Subsequent Medicare Wellness Visit.    The following portions of the patient's history were reviewed and   updated as appropriate: allergies, current medications, past family history, past medical history, past social history, past surgical history and problem list.    Compared to one year ago, the patient feels her physical   health is better.    Compared to one year ago, the patient feels her mental   health is better.    Recent Hospitalizations:  She was not admitted to the hospital during the last year.       Current Medical Providers:  Patient Care Team:  Rachna Hernandez APRN as PCP - General (Nurse Practitioner)  Jose Elias Calderon MD as Consulting Physician (Cardiology)    Outpatient Medications Prior to Visit   Medication Sig Dispense Refill   • Accu-Chek FastClix Lancets misc USE TO TEST BLOOD SUGAR TWICE DAILY 102 each 5   • aspirin 81 MG EC tablet Take 81 mg by mouth Daily.     • atorvastatin (LIPITOR) 10 MG tablet Take 1 tablet by mouth Daily. 90 tablet 1   • Dulaglutide (Trulicity) 0.75 MG/0.5ML solution pen-injector Inject  under the skin into the appropriate area as directed.     • glucose blood test strip Check BS BID Dx E11.9 300 each 3   • glucose monitor monitoring kit 1 each 2 (Two) Times a Day. Check BS BID DX E11.9 1 each 0   • hydroCHLOROthiazide (HYDRODIURIL) 25 MG tablet Take 1 tablet by mouth Daily. 90 tablet 1   • ibuprofen (ADVIL,MOTRIN) 200 MG tablet Take 200 mg by mouth.     • isosorbide mononitrate (IMDUR) 30 MG 24 hr tablet Take 1 tablet by mouth Daily. 90 tablet 1   • meclizine (ANTIVERT) 12.5 MG tablet Take 12.5 mg by mouth 3 (Three) Times a Day As Needed for Dizziness.     • metoprolol succinate XL (TOPROL-XL) 25 MG 24 hr tablet Take  "1 tablet by mouth Daily. 90 tablet 1   • nitroglycerin (NITROSTAT) 0.4 MG SL tablet Place 0.4 mg under the tongue Every 5 (Five) Minutes As Needed for Chest Pain. Take no more than 3 doses in 15 minutes.     • albuterol sulfate  (90 Base) MCG/ACT inhaler Inhale 2 puffs Every 4 (Four) Hours As Needed for Wheezing or Shortness of Air. 18 g 11   • glipizide (GLUCOTROL XL) 2.5 MG 24 hr tablet Take 2.5 mg by mouth Daily.       No facility-administered medications prior to visit.       No opioid medication identified on active medication list. I have reviewed chart for other potential  high risk medication/s and harmful drug interactions in the elderly.          Aspirin is on active medication list. Aspirin use is indicated based on review of current medical condition/s. Pros and cons of this therapy have been discussed today. Benefits of this medication outweigh potential harm.  Patient has been encouraged to continue taking this medication.  .      Patient Active Problem List   Diagnosis   • Type 2 diabetes mellitus with hyperglycemia, without long-term current use of insulin (HCC)   • History of MI (myocardial infarction)   • Vertigo   • Essential hypertension   • Tobacco abuse   • Dyspnea     Advance Care Planning  Advance Directive is not on file.  ACP discussion was held with the patient during this visit. Patient does not have an advance directive, declines further assistance.          Objective    Vitals:    03/14/22 0810   BP: 130/66   BP Location: Left arm   Patient Position: Sitting   Pulse: 74   Temp: 98.1 °F (36.7 °C)   TempSrc: Oral   Weight: 74 kg (163 lb 3.2 oz)   Height: 160 cm (62.99\")   PainSc: 0-No pain     BMI Readings from Last 1 Encounters:   03/14/22 28.92 kg/m²   BMI is above normal parameters. Recommendations include: exercise counseling and nutrition counseling    Does the patient have evidence of cognitive impairment? No    Physical Exam  Vitals reviewed.   Constitutional:       General: " She is not in acute distress.     Appearance: Normal appearance. She is well-developed. She is not ill-appearing.   HENT:      Head: Normocephalic and atraumatic.   Eyes:      Conjunctiva/sclera: Conjunctivae normal.      Pupils: Pupils are equal, round, and reactive to light.   Neck:      Vascular: No carotid bruit.   Cardiovascular:      Rate and Rhythm: Normal rate and regular rhythm.      Heart sounds: Normal heart sounds. No murmur heard.  Pulmonary:      Effort: Pulmonary effort is normal. No respiratory distress.      Breath sounds: Normal breath sounds.   Musculoskeletal:      Right lower leg: No edema.      Left lower leg: No edema.   Skin:     General: Skin is warm and dry.   Neurological:      Mental Status: She is alert and oriented to person, place, and time.   Psychiatric:         Mood and Affect: Mood and affect normal.         Behavior: Behavior normal.         Thought Content: Thought content normal.         Judgment: Judgment normal.                 HEALTH RISK ASSESSMENT    Smoking Status:  Social History     Tobacco Use   Smoking Status Current Every Day Smoker   • Packs/day: 0.50   • Years: 10.00   • Pack years: 5.00   • Types: Cigarettes   Smokeless Tobacco Never Used     Alcohol Consumption:  Social History     Substance and Sexual Activity   Alcohol Use Never     Fall Risk Screen:    [facility] Fall Risk Assessment was completed, and patient is at LOW risk for falls.Assessment completed on:3/14/2022    Depression Screening:  PHQ-2/PHQ-9 Depression Screening 3/14/2022   Retired PHQ-9 Total Score -   Retired Total Score -   Little Interest or Pleasure in Doing Things 0-->not at all   Feeling Down, Depressed or Hopeless 0-->not at all   PHQ-9: Brief Depression Severity Measure Score 0       Health Habits and Functional and Cognitive Screening:  Functional & Cognitive Status 3/14/2022   Do you have difficulty preparing food and eating? No   Do you have difficulty bathing yourself, getting dressed or  grooming yourself? No   Do you have difficulty using the toilet? No   Do you have difficulty moving around from place to place? No   Do you have trouble with steps or getting out of a bed or a chair? No   Current Diet Unhealthy Diet   Dental Exam Not up to date   Eye Exam Up to date   Exercise (times per week) 7 times per week   Current Exercises Include Walking   Do you need help using the phone?  No   Are you deaf or do you have serious difficulty hearing?  No   Do you need help with transportation? No   Do you need help shopping? No   Do you need help preparing meals?  No   Do you need help with housework?  No   Do you need help with laundry? No   Do you need help taking your medications? No   Do you need help managing money? No   Do you ever drive or ride in a car without wearing a seat belt? No   Have you felt unusual stress, anger or loneliness in the last month? No   Who do you live with? Child   If you need help, do you have trouble finding someone available to you? No   Have you been bothered in the last four weeks by sexual problems? No   Do you have difficulty concentrating, remembering or making decisions? No       Age-appropriate Screening Schedule:  Refer to the list below for future screening recommendations based on patient's age, sex and/or medical conditions. Orders for these recommended tests are listed in the plan section. The patient has been provided with a written plan.    Health Maintenance   Topic Date Due   • DIABETIC FOOT EXAM  Never done   • URINE MICROALBUMIN  03/12/2022   • DXA SCAN  03/14/2022 (Originally 1950)   • DIABETIC EYE EXAM  05/02/2022 (Originally 10/12/2021)   • ZOSTER VACCINE (1 of 2) 06/14/2022 (Originally 5/3/2001)   • INFLUENZA VACCINE  03/14/2023 (Originally 8/1/2021)   • MAMMOGRAM  03/14/2023 (Originally 1950)   • TDAP/TD VACCINES (2 - Tdap) 03/14/2023 (Originally 7/20/2015)   • HEMOGLOBIN A1C  05/08/2022   • LIPID PANEL  11/08/2022              Assessment/Plan    CMS Preventative Services Quick Reference  Risk Factors Identified During Encounter  Cardiovascular Disease  Immunizations Discussed/Encouraged (specific Immunizations; Tdap, Influenza, Pneumococcal 23, Shingrix and COVID19  Obesity/Overweight   Polypharmacy  Tobacco Use/Dependance (use dotphrase .tobaccocessation for documentation)  The above risks/problems have been discussed with the patient.  Follow up actions/plans if indicated are seen below in the Assessment/Plan Section.  Pertinent information has been shared with the patient in the After Visit Summary.    Diagnoses and all orders for this visit:    1. Medicare annual wellness visit, subsequent (Primary)  Comments:  Pt declines colonscopy, DEXA, mammogram. Pt aware of reason for tests. She is to contact insurance company about shingles vaccine coverage.     2. Type 2 diabetes mellitus with hyperglycemia, without long-term current use of insulin (HCC)  Comments:  Last Ha1c was 7.4. Almost at goal of 7. Will check again and adjust medications if needed.   Orders:  -     Comprehensive Metabolic Panel; Future  -     Lipid Panel; Future  -     Hemoglobin A1c; Future  -     Microalbumin / Creatinine Urine Ratio - Urine, Clean Catch; Future    3. Essential hypertension  Comments:  Stable. Continue Metoprolol 25mg and HCTZ 25mg daily.     4. Tobacco abuse  Assessment & Plan:  Patient strongly encouraged to consider tobacco cessation.  Potential health complications discussed.  Patient is aware. She has cut back to 1/2 ppd, but  is not interested in total cessation at this time.  Encouraged patient to reach out to the office when they are ready to quit.    Patient to notify office with any acute concerns or issues.  Patient verbalizes understanding, agrees with plan of care and has no further questions upon discharge.    Please note that portions of this note were completed with a voice recognition program.    Follow Up:   Return in about 4 months (around  7/14/2022) for Recheck.     An After Visit Summary and PPPS were made available to the patient.

## 2022-03-14 NOTE — ASSESSMENT & PLAN NOTE
Patient strongly encouraged to consider tobacco cessation.  Potential health complications discussed.  Patient is aware. She has cut back to 1/2 ppd, but  is not interested in total cessation at this time.  Encouraged patient to reach out to the office when they are ready to quit.

## 2022-03-17 RX ORDER — HYDROCHLOROTHIAZIDE 25 MG/1
25 TABLET ORAL DAILY
Qty: 90 TABLET | Refills: 1 | Status: CANCELLED | OUTPATIENT
Start: 2022-03-17

## 2022-03-17 RX ORDER — METOPROLOL SUCCINATE 25 MG/1
25 TABLET, EXTENDED RELEASE ORAL DAILY
Qty: 90 TABLET | Refills: 1 | Status: SHIPPED | OUTPATIENT
Start: 2022-03-17 | End: 2022-09-28

## 2022-03-17 RX ORDER — ISOSORBIDE MONONITRATE 30 MG/1
30 TABLET, EXTENDED RELEASE ORAL DAILY
Qty: 90 TABLET | Refills: 1 | Status: SHIPPED | OUTPATIENT
Start: 2022-03-17 | End: 2022-08-30 | Stop reason: SDUPTHER

## 2022-03-17 RX ORDER — ATORVASTATIN CALCIUM 10 MG/1
10 TABLET, FILM COATED ORAL DAILY
Qty: 90 TABLET | Refills: 1 | Status: SHIPPED | OUTPATIENT
Start: 2022-03-17 | End: 2022-10-24

## 2022-03-17 RX ORDER — HYDROCHLOROTHIAZIDE 25 MG/1
TABLET ORAL
Qty: 90 TABLET | Refills: 1 | Status: SHIPPED | OUTPATIENT
Start: 2022-03-17 | End: 2022-09-08

## 2022-03-17 NOTE — TELEPHONE ENCOUNTER
Caller: Zaria Howard    Relationship: Self    Best call back number: [phone]     Requested Prescriptions:   Requested Prescriptions     Pending Prescriptions Disp Refills   • hydroCHLOROthiazide (HYDRODIURIL) 25 MG tablet 90 tablet 1     Sig: Take 1 tablet by mouth Daily.   • isosorbide mononitrate (IMDUR) 30 MG 24 hr tablet 90 tablet 1     Sig: Take 1 tablet by mouth Daily.   • metoprolol succinate XL (TOPROL-XL) 25 MG 24 hr tablet 90 tablet 1     Sig: Take 1 tablet by mouth Daily.   • atorvastatin (LIPITOR) 10 MG tablet 90 tablet 1     Sig: Take 1 tablet by mouth Daily.       Pharmacy where request should be sent: [facility] [address] [address] [facility] [phone] [facility] [phone] FX     Additional details provided by patient:     Does the patient have less than a 3 day supply:  [x] Yes  [] No

## 2022-04-08 RX ORDER — DULAGLUTIDE 1.5 MG/.5ML
1.5 INJECTION, SOLUTION SUBCUTANEOUS
Qty: 12 PEN | Refills: 1 | Status: SHIPPED | OUTPATIENT
Start: 2022-04-08 | End: 2022-04-12

## 2022-04-12 ENCOUNTER — TELEPHONE (OUTPATIENT)
Dept: FAMILY MEDICINE CLINIC | Age: 72
End: 2022-04-12

## 2022-04-12 NOTE — TELEPHONE ENCOUNTER
Pt called stating she is not going to do the increase in trulicity because it makes her dizzy.  I asked her if she maybe had something else going on and she said NO it did not start happening until the increased dose.  I asked her if she took her BS when she was having a dizzy spell and she said NO she knows her body and that new dose is causing it and is NOT going to take in increase dose.  Med list updated.

## 2022-04-14 ENCOUNTER — TELEPHONE (OUTPATIENT)
Dept: FAMILY MEDICINE CLINIC | Age: 72
End: 2022-04-14

## 2022-04-14 NOTE — TELEPHONE ENCOUNTER
Caller: Zaria Howard    Relationship: Self    Best call back number: [phone]     What medication are you requesting: SINUS INFECTION MEDICATION     What are your current symptoms: RUNNY NOSE/DRAINAGE/HEADACHE/EAR PAINS AND SORE THOART     How long have you been experiencing symptoms: Monday 04/11/22     Have you had these symptoms before:    [x] Yes  [] No    Have you been treated for these symptoms before:   [x] Yes  [] No    If a prescription is needed, what is your preferred pharmacy and phone number: [facility] [place], [place] - [address] [place] [address] [phone] [facility] [phone]      Additional notes:PLEASE CALL AND ADVISE.

## 2022-04-14 NOTE — TELEPHONE ENCOUNTER
Spoke to pt and informed her that she would need an appointment to be seen. Rachna would not just send in a medication over the phone without an evaluation and pt refused.   States she is just too sick to come in and that if she did come in then she would just end up catching something else.  States she will just find herself another doctor.

## 2022-07-18 ENCOUNTER — LAB (OUTPATIENT)
Dept: LAB | Facility: HOSPITAL | Age: 72
End: 2022-07-18

## 2022-07-18 ENCOUNTER — OFFICE VISIT (OUTPATIENT)
Dept: FAMILY MEDICINE CLINIC | Age: 72
End: 2022-07-18

## 2022-07-18 VITALS
BODY MASS INDEX: 28.35 KG/M2 | HEIGHT: 63 IN | DIASTOLIC BLOOD PRESSURE: 74 MMHG | WEIGHT: 160 LBS | TEMPERATURE: 97.7 F | SYSTOLIC BLOOD PRESSURE: 119 MMHG | HEART RATE: 73 BPM

## 2022-07-18 DIAGNOSIS — I10 ESSENTIAL HYPERTENSION: Primary | ICD-10-CM

## 2022-07-18 DIAGNOSIS — R10.10 PAIN OF UPPER ABDOMEN: ICD-10-CM

## 2022-07-18 DIAGNOSIS — E11.65 TYPE 2 DIABETES MELLITUS WITH HYPERGLYCEMIA, WITHOUT LONG-TERM CURRENT USE OF INSULIN: ICD-10-CM

## 2022-07-18 DIAGNOSIS — R06.00 DYSPNEA, UNSPECIFIED TYPE: ICD-10-CM

## 2022-07-18 DIAGNOSIS — E78.2 MIXED HYPERLIPIDEMIA: ICD-10-CM

## 2022-07-18 DIAGNOSIS — H69.81 DYSFUNCTION OF RIGHT EUSTACHIAN TUBE: ICD-10-CM

## 2022-07-18 DIAGNOSIS — K59.00 CONSTIPATION, UNSPECIFIED CONSTIPATION TYPE: ICD-10-CM

## 2022-07-18 PROBLEM — H69.91 DYSFUNCTION OF RIGHT EUSTACHIAN TUBE: Status: ACTIVE | Noted: 2022-07-18

## 2022-07-18 LAB
ALBUMIN SERPL-MCNC: 3.7 G/DL (ref 3.5–5.2)
ALBUMIN/GLOB SERPL: 1.2 G/DL
ALP SERPL-CCNC: 84 U/L (ref 39–117)
ALT SERPL W P-5'-P-CCNC: 11 U/L (ref 1–33)
ANION GAP SERPL CALCULATED.3IONS-SCNC: 11.5 MMOL/L (ref 5–15)
AST SERPL-CCNC: 7 U/L (ref 1–32)
BILIRUB SERPL-MCNC: 0.4 MG/DL (ref 0–1.2)
BUN SERPL-MCNC: 20 MG/DL (ref 8–23)
BUN/CREAT SERPL: 25 (ref 7–25)
CALCIUM SPEC-SCNC: 9.5 MG/DL (ref 8.6–10.5)
CHLORIDE SERPL-SCNC: 100 MMOL/L (ref 98–107)
CHOLEST SERPL-MCNC: 125 MG/DL (ref 0–200)
CO2 SERPL-SCNC: 28.5 MMOL/L (ref 22–29)
CREAT SERPL-MCNC: 0.8 MG/DL (ref 0.57–1)
EGFRCR SERPLBLD CKD-EPI 2021: 78.9 ML/MIN/1.73
GLOBULIN UR ELPH-MCNC: 3.2 GM/DL
GLUCOSE SERPL-MCNC: 159 MG/DL (ref 65–99)
HBA1C MFR BLD: 7.3 % (ref 4.8–5.6)
HDLC SERPL-MCNC: 39 MG/DL (ref 40–60)
LDLC SERPL CALC-MCNC: 62 MG/DL (ref 0–100)
LDLC/HDLC SERPL: 1.49 {RATIO}
POTASSIUM SERPL-SCNC: 3.4 MMOL/L (ref 3.5–5.2)
PROT SERPL-MCNC: 6.9 G/DL (ref 6–8.5)
SODIUM SERPL-SCNC: 140 MMOL/L (ref 136–145)
TRIGL SERPL-MCNC: 139 MG/DL (ref 0–150)
VLDLC SERPL-MCNC: 24 MG/DL (ref 5–40)

## 2022-07-18 PROCEDURE — 36415 COLL VENOUS BLD VENIPUNCTURE: CPT

## 2022-07-18 PROCEDURE — 80053 COMPREHEN METABOLIC PANEL: CPT

## 2022-07-18 PROCEDURE — 83036 HEMOGLOBIN GLYCOSYLATED A1C: CPT

## 2022-07-18 PROCEDURE — 80061 LIPID PANEL: CPT

## 2022-07-18 PROCEDURE — 99214 OFFICE O/P EST MOD 30 MIN: CPT | Performed by: NURSE PRACTITIONER

## 2022-07-18 RX ORDER — FLUTICASONE PROPIONATE 50 MCG
2 SPRAY, SUSPENSION (ML) NASAL DAILY
Qty: 18 ML | Refills: 5 | Status: SHIPPED | OUTPATIENT
Start: 2022-07-18

## 2022-07-18 RX ORDER — LEVALBUTEROL TARTRATE 45 UG/1
1-2 AEROSOL, METERED ORAL EVERY 4 HOURS PRN
Qty: 15 G | Refills: 11 | Status: SHIPPED | OUTPATIENT
Start: 2022-07-18

## 2022-07-18 RX ORDER — ONDANSETRON 4 MG/1
4 TABLET, ORALLY DISINTEGRATING ORAL AS NEEDED
COMMUNITY

## 2022-07-18 NOTE — PROGRESS NOTES
"Chief Complaint  Hypertension (4 month follow up )    Subjective          Zaria Howard presents to [facility] for routine follow-up.  Patient is taking Toprol-XL 25 mg, hydrochlorothiazide 25 mg for hypertension atorvastatin 10 mg for hyperlipidemia.  Dulaglutide 1.5 mg was increased from 0.75mg at last visit for her diabetes.   Blood pressure is well controlled today at 119/74.  Last labs were in March of this year.  Reviewed.  Last A1c was 7.7.    Patient complaining of fluid in her right ear.  This has been a problem since it ruptured 3 years ago.  She is taking Xyzal but cannot afford Flonase.    Patient is also requesting Xopenex.  She states that she is really not able to afford this either.  She feels as if she gets short of air occasionally.  Patient is still smoking quarter of a pack a day.    Patient is also complaining of vomiting after eating within 20 or 30 minutes.  She does have some abdominal pain in her left upper quadrant and epigastric area when this occurs.  Denies issues with her gallbladder.  Denies fever, chills, diarrhea, chest pains.  Also is complaining of lifelong constipation that has worsened after starting Trulicity.  States that she does drink a lot of water.  Has used her daughter's Linzess and it helps as well.        Objective   Vital Signs:   Vitals:    07/18/22 0801   BP: 119/74   BP Location: Right arm   Patient Position: Sitting   Pulse: 73   Temp: 97.7 °F (36.5 °C)   TempSrc: Oral   Weight: 72.6 kg (160 lb)   Height: 160 cm (62.99\")       Physical Exam  Vitals reviewed.   Constitutional:       General: She is not in acute distress.     Appearance: Normal appearance. She is well-developed.   HENT:      Head: Normocephalic and atraumatic.   Eyes:      Conjunctiva/sclera: Conjunctivae normal.      Pupils: Pupils are equal, round, and reactive to light.   Neck:      Vascular: No carotid bruit.   Cardiovascular:      Rate and Rhythm: Normal rate " and regular rhythm.      Heart sounds: Normal heart sounds. No murmur heard.  Pulmonary:      Effort: Pulmonary effort is normal. No respiratory distress.      Breath sounds: Normal breath sounds.   Abdominal:      General: Bowel sounds are normal. There is no distension.      Palpations: Abdomen is soft. There is no mass.      Tenderness: There is no abdominal tenderness.      Hernia: No hernia is present.   Skin:     General: Skin is warm and dry.   Neurological:      Mental Status: She is alert and oriented to person, place, and time.   Psychiatric:         Mood and Affect: Mood and affect normal.         Behavior: Behavior normal.         Thought Content: Thought content normal.         Judgment: Judgment normal.          Result Review :              Assessment and Plan    Diagnoses and all orders for this visit:    1. Essential hypertension (Primary)  Comments:  Stable.  Continue Toprol 25 mg and hydrochlorothiazide 25 mg daily.    2. Type 2 diabetes mellitus with hyperglycemia, without long-term current use of insulin (HCC)  Comments:  Continue at current dose of 1.5 mg weekly.  Will notify patient of results and if medication needs to be adjusted again.  Continue diet modifications and exerci  Orders:  -     Comprehensive Metabolic Panel; Future  -     Lipid Panel; Future  -     Hemoglobin A1c; Future    3. Mixed hyperlipidemia  Comments:  Continue atorvastatin 10 mg.  Diet modifications encouraged.  Will notify patient of results and if medication needs to be adjusted.    4. Pain of upper abdomen  Comments:  Getting ultrasound.  Go to ED with severe pain.    5. Dysfunction of right eustachian tube  Comments:  Prescribed Flonase.  Sending to prescription assistance program.    6. Dyspnea, unspecified type  Comments:  Will order Xopenex.  If patient finds that she is using it daily, will also prescribe maintenance inhaler.  Sending patient to prescription assistance program.    7. Constipation, unspecified  constipation type  Comments:  Increase water intake.  Colace daily.  Increase activity and fiber.  We will hold off on Linzess at this time.    Other orders  -     fluticasone (Flonase) 50 MCG/ACT nasal spray; 2 sprays into the nostril(s) as directed by provider Daily.  Dispense: 18 mL; Refill: 5  -     levalbuterol (Xopenex HFA) 45 MCG/ACT inhaler; Inhale 1-2 puffs Every 4 (Four) Hours As Needed for Wheezing.  Dispense: 15 g; Refill: 11    Patient to notify office with any acute concerns or issues.  Patient verbalizes understanding, agrees with plan of care and has no further questions upon discharge.    Please note that portions of this note were completed with a voice recognition program.    Follow Up    Return in about 4 months (around 11/18/2022) for Recheck.  Patient was given instructions and counseling regarding her condition or for health maintenance advice. Please see specific information pulled into the AVS if appropriate.

## 2022-08-29 RX ORDER — NITROGLYCERIN 0.4 MG/1
TABLET SUBLINGUAL
Qty: 25 TABLET | Refills: 2 | Status: SHIPPED | OUTPATIENT
Start: 2022-08-29

## 2022-08-30 ENCOUNTER — OFFICE VISIT (OUTPATIENT)
Dept: CARDIOLOGY | Facility: CLINIC | Age: 72
End: 2022-08-30

## 2022-08-30 VITALS
BODY MASS INDEX: 29.81 KG/M2 | HEART RATE: 66 BPM | DIASTOLIC BLOOD PRESSURE: 54 MMHG | SYSTOLIC BLOOD PRESSURE: 131 MMHG | WEIGHT: 162 LBS | HEIGHT: 62 IN

## 2022-08-30 DIAGNOSIS — I10 ESSENTIAL HYPERTENSION: Primary | ICD-10-CM

## 2022-08-30 DIAGNOSIS — R07.9 CHEST PAIN, UNSPECIFIED TYPE: ICD-10-CM

## 2022-08-30 DIAGNOSIS — E78.2 MIXED HYPERLIPIDEMIA: ICD-10-CM

## 2022-08-30 PROCEDURE — 99214 OFFICE O/P EST MOD 30 MIN: CPT | Performed by: INTERNAL MEDICINE

## 2022-08-30 RX ORDER — ISOSORBIDE MONONITRATE 30 MG/1
30 TABLET, EXTENDED RELEASE ORAL DAILY
Qty: 90 TABLET | Refills: 3 | Status: SHIPPED | OUTPATIENT
Start: 2022-08-30

## 2022-08-30 NOTE — ASSESSMENT & PLAN NOTE
Blood pressure is reasonably well controlled.  Patient reports having leg cramps.  Labs done last month showed potassium of 3.4 which could be the reason for leg cramps.  Encouraged to increase potassium containing diet.  We will repeat basic metabolic panel in 1 week and for persistent low potassium levels, potassium supplementation can be added to the regimen.

## 2022-08-30 NOTE — PROGRESS NOTES
CARDIOLOGY FOLLOW-UP PROGRESS NOTE        Chief Complaint  Hypertension, Hyperlipidemia (Pt c/o bilateral leg cramp), and Chest Pain    Subjective            Zaria Howard presents to [facility] CARDIOLOGY  History of Present Illness      Ms Howard is here for annual follow-up visit for chest pain, hypertension hyperlipidemia.  She had 3 or 4 episodes of chest discomfort during the past 1 year, mostly at the time of stress.  These are not related to activities.  Denies any major shortness of breath or palpitations or dizziness.  She is experiencing cramps of both lower extremities for the past one week.       Past History:    1) Hypertension; 2) Chest pain with a stress test showing small apical ischemia, opted for medical management; 3) Diabetes mellitus, on oral agents; 4) Hypothyroidism.     Medical History:  Past Medical History:   Diagnosis Date   • Diabetes mellitus (HCC)    • Hyperlipidemia    • Hypertension        Surgical History: has no past surgical history on file.     Family History: Reviewed, no family history of premature coronary artery disease    Social History: reports that she has been smoking cigarettes. She has a 5.00 pack-year smoking history. She has never used smokeless tobacco. She reports that she does not drink alcohol and does not use drugs.    Allergies: Diphenhydramine and Sudafed [pseudoephedrine hcl]    Current Outpatient Medications on File Prior to Visit   Medication Sig   • Accu-Chek FastClix Lancets misc USE TO TEST BLOOD SUGAR TWICE DAILY   • aspirin 81 MG EC tablet Take 81 mg by mouth Daily.   • atorvastatin (LIPITOR) 10 MG tablet Take 1 tablet by mouth Daily.   • Dulaglutide 0.75 MG/0.5ML solution pen-injector Inject 1.5 mg under the skin into the appropriate area as directed 1 (One) Time Per Week.   • fluticasone (Flonase) 50 MCG/ACT nasal spray 2 sprays into the nostril(s) as directed by provider Daily.   • glucose blood test strip Check BS BID Dx E11.9  "  • glucose monitor monitoring kit 1 each 2 (Two) Times a Day. Check BS BID DX E11.9   • hydroCHLOROthiazide (HYDRODIURIL) 25 MG tablet Take 1 tablet BY MOUTH EVERY DAY   • ibuprofen (ADVIL,MOTRIN) 200 MG tablet Take 200 mg by mouth As Needed.   • levalbuterol (Xopenex HFA) 45 MCG/ACT inhaler Inhale 1-2 puffs Every 4 (Four) Hours As Needed for Wheezing.   • meclizine (ANTIVERT) 12.5 MG tablet Take 12.5 mg by mouth 3 (Three) Times a Day As Needed for Dizziness.   • metoprolol succinate XL (TOPROL-XL) 25 MG 24 hr tablet Take 1 tablet by mouth Daily.   • nitroglycerin (NITROSTAT) 0.4 MG SL tablet DISSOLVE 1 UNDER TONGUE  EVERY 5 MINUTES FOR 3 DOSES; IF NO RELIEF GO TO ER   • ondansetron ODT (ZOFRAN-ODT) 4 MG disintegrating tablet Place 4 mg on the tongue As Needed for Nausea or Vomiting.   • [DISCONTINUED] isosorbide mononitrate (IMDUR) 30 MG 24 hr tablet Take 1 tablet by mouth Daily.     No current facility-administered medications on file prior to visit.          Review of Systems   Respiratory: Negative for cough, shortness of breath and wheezing.    Cardiovascular: Negative for chest pain, palpitations and leg swelling.   Gastrointestinal: Negative for nausea and vomiting.   Musculoskeletal:        Positive for leg cramps bilaterally   Neurological: Negative for dizziness and syncope.        Objective     /54   Pulse 66   Ht 157.5 cm (62\")   Wt 73.5 kg (162 lb)   BMI 29.63 kg/m²       Physical Exam    General : Alert, awake, no acute distress  Neck : Supple, no carotid bruit, no jugular venous distention  CVS : Regular rate and rhythm, no murmur, rubs or gallops  Lungs: Clear to auscultation bilaterally, no crackles or rhonchi  Abdomen: Soft, nontender, bowel sounds heard in all 4 quadrants  Extremities: Warm, well-perfused, no pedal edema    Result Review :     The following data was reviewed by: Jose Elias Calderon MD on 08/30/2022:    CMP    CMP 11/8/21 3/14/22 7/18/22   Glucose 162 (A) 152 (A) 159 (A) " "  BUN 19 20 20   Creatinine 0.72 0.72 0.80   eGFR Non African Am 80     Sodium 139 138 140   Potassium 3.7 3.6 3.4 (A)   Chloride 102 100 100   Calcium 9.5 9.5 9.5   Albumin 4.30 4.30 3.70   Total Bilirubin 0.3 0.5 0.4   Alkaline Phosphatase 97 84 84   AST (SGOT) 11 12 7   ALT (SGPT) 14 10 11   (A) Abnormal value                Lipid Panel    Lipid Panel 11/8/21 3/14/22 7/18/22   Total Cholesterol 148 130 125   Triglycerides 161 (A) 164 (A) 139   HDL Cholesterol 38 (A) 39 (A) 39 (A)   VLDL Cholesterol 28 28 24   LDL Cholesterol  82 63 62   LDL/HDL Ratio 2.05 1.49 1.49   (A) Abnormal value                 Data reviewed: Cardiology studies      Echocardiogram done on 11/5/2018 showed    1.  Normal left ventricular systolic function with an estimated LV ejection fraction of 55%.  2.  Mild to moderate mitral regurgitation.  3.  Moderate left atrial enlargement.               Assessment and Plan        Diagnoses and all orders for this visit:    1. Essential hypertension (Primary)  Assessment & Plan:  Blood pressure is reasonably well controlled.  Patient reports having leg cramps.  Labs done last month showed potassium of 3.4 which could be the reason for leg cramps.  Encouraged to increase potassium containing diet.  We will repeat basic metabolic panel in 1 week and for persistent low potassium levels, potassium supplementation can be added to the regimen.    Orders:  -     Basic Metabolic Panel; Future    2. Mixed hyperlipidemia  Assessment & Plan:  LDL in 60s, at goal.  Continue atorvastatin 10 mg daily.      3. Chest pain, unspecified type  Assessment & Plan:  She had 2 or 3 episodes chest pain for the last 1 year due to at the time of \"stress\".  No exertional symptoms.  Previous SPECT stress test showed mild ischemia but patient opted for medical management.  Symptoms are well controlled at this time.  We will continue isosorbide mononitrate along with aspirin and statins.    Orders:  -     isosorbide mononitrate " (IMDUR) 30 MG 24 hr tablet; Take 1 tablet by mouth Daily.  Dispense: 90 tablet; Refill: 3            Follow Up     We will follow the BMP reports when available, otherwise follow-up in 1 year  Return in about 1 year (around 8/30/2023) for Next scheduled follow up.    Patient was given instructions and counseling regarding her condition or for health maintenance advice. Please see specific information pulled into the AVS if appropriate.

## 2022-09-08 RX ORDER — HYDROCHLOROTHIAZIDE 25 MG/1
TABLET ORAL
Qty: 90 TABLET | Refills: 1 | Status: SHIPPED | OUTPATIENT
Start: 2022-09-08 | End: 2023-03-27

## 2022-09-18 DIAGNOSIS — R07.9 CHEST PAIN, UNSPECIFIED TYPE: ICD-10-CM

## 2022-09-19 RX ORDER — ISOSORBIDE MONONITRATE 30 MG/1
TABLET, EXTENDED RELEASE ORAL
Qty: 90 TABLET | Refills: 1 | OUTPATIENT
Start: 2022-09-19

## 2022-09-28 DIAGNOSIS — R07.9 CHEST PAIN, UNSPECIFIED TYPE: ICD-10-CM

## 2022-09-28 RX ORDER — METOPROLOL SUCCINATE 25 MG/1
TABLET, EXTENDED RELEASE ORAL
Qty: 90 TABLET | Refills: 1 | Status: SHIPPED | OUTPATIENT
Start: 2022-09-28 | End: 2023-04-05

## 2022-09-28 RX ORDER — ISOSORBIDE MONONITRATE 30 MG/1
TABLET, EXTENDED RELEASE ORAL
Qty: 90 TABLET | Refills: 1 | OUTPATIENT
Start: 2022-09-28

## 2022-10-24 RX ORDER — ATORVASTATIN CALCIUM 10 MG/1
TABLET, FILM COATED ORAL
Qty: 90 TABLET | Refills: 1 | Status: SHIPPED | OUTPATIENT
Start: 2022-10-24

## 2022-12-28 RX ORDER — MECLIZINE HCL 12.5 MG/1
TABLET ORAL
Qty: 30 TABLET | Refills: 0 | Status: SHIPPED | OUTPATIENT
Start: 2022-12-28

## 2023-03-16 ENCOUNTER — OFFICE VISIT (OUTPATIENT)
Dept: FAMILY MEDICINE CLINIC | Age: 73
End: 2023-03-16
Payer: MEDICARE

## 2023-03-16 VITALS
DIASTOLIC BLOOD PRESSURE: 79 MMHG | WEIGHT: 164.2 LBS | HEIGHT: 62 IN | TEMPERATURE: 98.7 F | OXYGEN SATURATION: 94 % | HEART RATE: 72 BPM | SYSTOLIC BLOOD PRESSURE: 134 MMHG | BODY MASS INDEX: 30.22 KG/M2

## 2023-03-16 DIAGNOSIS — E11.65 TYPE 2 DIABETES MELLITUS WITH HYPERGLYCEMIA, WITHOUT LONG-TERM CURRENT USE OF INSULIN: ICD-10-CM

## 2023-03-16 DIAGNOSIS — Z72.0 TOBACCO ABUSE: ICD-10-CM

## 2023-03-16 DIAGNOSIS — R30.0 DYSURIA: Primary | ICD-10-CM

## 2023-03-16 DIAGNOSIS — R31.9 HEMATURIA, UNSPECIFIED TYPE: ICD-10-CM

## 2023-03-16 PROCEDURE — 1159F MED LIST DOCD IN RCRD: CPT | Performed by: NURSE PRACTITIONER

## 2023-03-16 PROCEDURE — 3052F HG A1C>EQUAL 8.0%<EQUAL 9.0%: CPT | Performed by: NURSE PRACTITIONER

## 2023-03-16 PROCEDURE — 99213 OFFICE O/P EST LOW 20 MIN: CPT | Performed by: NURSE PRACTITIONER

## 2023-03-16 PROCEDURE — G0439 PPPS, SUBSEQ VISIT: HCPCS | Performed by: NURSE PRACTITIONER

## 2023-03-16 PROCEDURE — 3078F DIAST BP <80 MM HG: CPT | Performed by: NURSE PRACTITIONER

## 2023-03-16 PROCEDURE — 3075F SYST BP GE 130 - 139MM HG: CPT | Performed by: NURSE PRACTITIONER

## 2023-03-16 RX ORDER — VARENICLINE TARTRATE 25 MG
KIT ORAL
Qty: 53 TABLET | Refills: 0 | Status: SHIPPED | OUTPATIENT
Start: 2023-03-16 | End: 2023-04-13

## 2023-03-16 RX ORDER — VARENICLINE TARTRATE 1 MG/1
1 TABLET, FILM COATED ORAL 2 TIMES DAILY
Qty: 60 TABLET | Refills: 1 | Status: SHIPPED | OUTPATIENT
Start: 2023-03-16

## 2023-03-16 NOTE — PROGRESS NOTES
The ABCs of the Annual Wellness Visit  Subsequent Medicare Wellness Visit    Subjective    Zaria Howard is a 72 y.o. female who presents for a Subsequent Medicare Wellness Visit.    The following portions of the patient's history were reviewed and   updated as appropriate: allergies, current medications, past family history, past medical history, past social history, past surgical history and problem list.    Compared to one year ago, the patient feels her physical   health is the same.    Compared to one year ago, the patient feels her mental   health is better.    Recent Hospitalizations:  She was not admitted to the hospital during the last year.       Current Medical Providers:  Patient Care Team:  Rachna Hernandez APRN as PCP - General (Nurse Practitioner)  Jose Elias Calderon MD as Consulting Physician (Cardiology)    Outpatient Medications Prior to Visit   Medication Sig Dispense Refill   • Accu-Chek FastClix Lancets misc USE TO TEST BLOOD SUGAR TWICE DAILY 102 each 5   • aspirin 81 MG EC tablet Take 1 tablet by mouth Daily.     • atorvastatin (LIPITOR) 10 MG tablet Take 1 tablet BY MOUTH EVERY DAY 90 tablet 1   • fluticasone (Flonase) 50 MCG/ACT nasal spray 2 sprays into the nostril(s) as directed by provider Daily. 18 mL 5   • glucose blood test strip Check BS BID Dx E11.9 300 each 3   • glucose monitor monitoring kit 1 each 2 (Two) Times a Day. Check BS BID DX E11.9 1 each 0   • hydroCHLOROthiazide (HYDRODIURIL) 25 MG tablet Take 1 tablet BY MOUTH EVERY DAY 90 tablet 1   • ibuprofen (ADVIL,MOTRIN) 200 MG tablet Take 1 tablet by mouth As Needed.     • isosorbide mononitrate (IMDUR) 30 MG 24 hr tablet Take 1 tablet by mouth Daily. 90 tablet 3   • levalbuterol (Xopenex HFA) 45 MCG/ACT inhaler Inhale 1-2 puffs Every 4 (Four) Hours As Needed for Wheezing. 15 g 11   • meclizine (ANTIVERT) 12.5 MG tablet TAKE 2 TABLETS BY ORAL ROUTE 1 HOUR BEFORE EXPOSURE TO MOTION 30 tablet 0   • metoprolol succinate XL  "(TOPROL-XL) 25 MG 24 hr tablet Take 1 tablet BY MOUTH EVERY DAY 90 tablet 1   • nitroglycerin (NITROSTAT) 0.4 MG SL tablet DISSOLVE 1 UNDER TONGUE  EVERY 5 MINUTES FOR 3 DOSES; IF NO RELIEF GO TO ER 25 tablet 2   • ondansetron ODT (ZOFRAN-ODT) 4 MG disintegrating tablet Place 1 tablet on the tongue As Needed for Nausea or Vomiting.     • Dulaglutide 0.75 MG/0.5ML solution pen-injector Inject 1.5 mg under the skin into the appropriate area as directed 1 (One) Time Per Week. 4 mL 2     No facility-administered medications prior to visit.       No opioid medication identified on active medication list. I have reviewed chart for other potential  high risk medication/s and harmful drug interactions in the elderly.          Aspirin is on active medication list. Aspirin use is indicated based on review of current medical condition/s. Pros and cons of this therapy have been discussed today. Benefits of this medication outweigh potential harm.  Patient has been encouraged to continue taking this medication.  .      Patient Active Problem List   Diagnosis   • Type 2 diabetes mellitus with hyperglycemia, without long-term current use of insulin (HCC)   • History of MI (myocardial infarction)   • Vertigo   • Mixed hyperlipidemia   • Essential hypertension   • Tobacco abuse   • Dyspnea   • Dysfunction of right eustachian tube   • Pain of upper abdomen   • Constipation   • Chest pain     Advance Care Planning  Advance Directive is not on file.  ACP discussion was held with the patient during this visit. Patient does not have an advance directive, declines further assistance.     Objective    Vitals:    03/16/23 1119   BP: 134/79   BP Location: Right arm   Patient Position: Sitting   Cuff Size: Adult   Pulse: 72   Temp: 98.7 °F (37.1 °C)   TempSrc: Oral   SpO2: 94%   Weight: 74.5 kg (164 lb 3.2 oz)   Height: 157.5 cm (62.01\")     Estimated body mass index is 30.02 kg/m² as calculated from the following:    Height as of this " "encounter: 157.5 cm (62.01\").    Weight as of this encounter: 74.5 kg (164 lb 3.2 oz).    BMI is >= 30 and <35. (Class 1 Obesity). The following options were offered after discussion;: exercise counseling/recommendations      Does the patient have evidence of cognitive impairment? No    Lab Results   Component Value Date    TRIG 171 (H) 03/17/2023    HDL 41 03/17/2023    LDL 82 03/17/2023    VLDL 29 03/17/2023    HGBA1C 8.10 (H) 03/17/2023        HEALTH RISK ASSESSMENT    Smoking Status:  Social History     Tobacco Use   Smoking Status Every Day   • Packs/day: 0.50   • Years: 39.00   • Pack years: 19.50   • Types: Cigarettes   • Start date: 1984   Smokeless Tobacco Never     Alcohol Consumption:  Social History     Substance and Sexual Activity   Alcohol Use Never     Fall Risk Screen:    XOCHITLADI Fall Risk Assessment was completed, and patient is at MODERATE risk for falls. Assessment completed on:3/16/2023    Depression Screening:  PHQ-2/PHQ-9 Depression Screening 3/16/2023   Little Interest or Pleasure in Doing Things 0-->not at all   Feeling Down, Depressed or Hopeless 0-->not at all   PHQ-9: Brief Depression Severity Measure Score 0       Health Habits and Functional and Cognitive Screening:  Functional & Cognitive Status 3/16/2023   Do you have difficulty preparing food and eating? No   Do you have difficulty bathing yourself, getting dressed or grooming yourself? No   Do you have difficulty using the toilet? No   Do you have difficulty moving around from place to place? No   Do you have trouble with steps or getting out of a bed or a chair? No   Current Diet Well Balanced Diet   Dental Exam Other   Eye Exam Up to date   Exercise (times per week) 5 times per week   Current Exercises Include Walking   Do you need help using the phone?  No   Are you deaf or do you have serious difficulty hearing?  No   Do you need help with transportation? No   Do you need help shopping? No   Do you need help preparing meals?  No "   Do you need help with housework?  No   Do you need help with laundry? No   Do you need help taking your medications? No   Do you need help managing money? No   Do you ever drive or ride in a car without wearing a seat belt? No   Have you felt unusual stress, anger or loneliness in the last month? No   Who do you live with? Child   If you need help, do you have trouble finding someone available to you? No   Have you been bothered in the last four weeks by sexual problems? -   Do you have difficulty concentrating, remembering or making decisions? No       Age-appropriate Screening Schedule:  Refer to the list below for future screening recommendations based on patient's age, sex and/or medical conditions. Orders for these recommended tests are listed in the plan section. The patient has been provided with a written plan.    Health Maintenance   Topic Date Due   • DXA SCAN  Never done   • ZOSTER VACCINE (1 of 2) Never done   • DIABETIC FOOT EXAM  Never done   • COVID-19 Vaccine (5 - Booster for Moderna series) 09/01/2022   • INFLUENZA VACCINE  03/31/2023 (Originally 8/1/2022)   • Pneumococcal Vaccine 65+ (1 - PCV) 03/16/2024 (Originally 5/3/1956)   • MAMMOGRAM  03/16/2024 (Originally 1950)   • TDAP/TD VACCINES (2 - Tdap) 03/16/2024 (Originally 7/20/2015)   • COLORECTAL CANCER SCREENING  03/16/2024 (Originally 1950)   • DIABETIC EYE EXAM  03/24/2023   • HEMOGLOBIN A1C  09/17/2023   • ANNUAL WELLNESS VISIT  03/16/2024   • LIPID PANEL  03/17/2024   • URINE MICROALBUMIN  03/17/2024   • HEPATITIS C SCREENING  Completed                CMS Preventative Services Quick Reference  Risk Factors Identified During Encounter  Fall Risk-High or Moderate: Discussed Fall Prevention in the home  Immunizations Discussed/Encouraged: Tdap, Prevnar 20 (Pneumococcal 20-valent conjugate), Shingrix and COVID19  Polypharmacy: Medication List reviewed and Medications are appropriate for patient  Dental Screening Recommended - not  "needed d/t having dentures  Vision Screening Recommended   The above risks/problems have been discussed with the patient.  Pertinent information has been shared with the patient in the After Visit Summary.  An After Visit Summary and PPPS were made available to the patient.      PT to try to get shingles shot when it is free at [facility].   Follow Up:   Next Medicare Wellness visit to be scheduled in 1 year.       Additional E&M Note during same encounter follows:  Patient has multiple medical problems which are significant and separately identifiable that require additional work above and beyond the Medicare Wellness Visit.      Chief Complaint  Medicare Wellness-subsequent and Difficulty Urinating (2 days, burning w/ urination, frequency, sometimes dark in color )    Subjective        HPI  Zaria Howard is also being seen today for dysuria, frequency, dark x 2 days. Denies fever, chills, n/v/d, soa or cp. Has had brant flank pain.          Objective   Vital Signs:  /79 (BP Location: Right arm, Patient Position: Sitting, Cuff Size: Adult)   Pulse 72   Temp 98.7 °F (37.1 °C) (Oral)   Ht 157.5 cm (62.01\")   Wt 74.5 kg (164 lb 3.2 oz)   SpO2 94%   BMI 30.02 kg/m²     Physical Exam  Vitals reviewed.   Constitutional:       General: She is not in acute distress.     Appearance: Normal appearance. She is well-developed.   HENT:      Head: Normocephalic and atraumatic.   Eyes:      Conjunctiva/sclera: Conjunctivae normal.      Pupils: Pupils are equal, round, and reactive to light.   Cardiovascular:      Rate and Rhythm: Normal rate and regular rhythm.      Heart sounds: Normal heart sounds. No murmur heard.  Pulmonary:      Effort: Pulmonary effort is normal. No respiratory distress.      Breath sounds: Normal breath sounds.   Abdominal:      General: Bowel sounds are normal. There is no distension.      Palpations: Abdomen is soft. There is no mass.      Tenderness: There is no abdominal tenderness. There is " right CVA tenderness and left CVA tenderness.      Hernia: No hernia is present.   Skin:     General: Skin is warm and dry.   Neurological:      Mental Status: She is alert and oriented to person, place, and time.   Psychiatric:         Mood and Affect: Mood and affect normal.         Behavior: Behavior normal.         Thought Content: Thought content normal.         Judgment: Judgment normal.                         Assessment and Plan   Diagnoses and all orders for this visit:    1. Dysuria (Primary)  Comments:  Pt to come in the morning for her labs and urine. Will treat accordingly.   Orders:  -     Urinalysis With Culture If Indicated -; Future    2. Type 2 diabetes mellitus with hyperglycemia, without long-term current use of insulin (HCC)  Comments:  Will notify pt of results and adjust medication if needed. Currently on trulicity 1.5mg weekly.   Orders:  -     Comprehensive Metabolic Panel; Future  -     Lipid Panel; Future  -     Hemoglobin A1c; Future  -     Microalbumin / Creatinine Urine Ratio - Urine, Clean Catch; Future    3. Tobacco abuse  Comments:  Pt has agreed to chantix. Potential s/e discussed.   Assessment & Plan:  Has tried the patches      4. Hematuria, unspecified type  -     Ambulatory Referral to Urology    Other orders  -     Varenicline Tartrate 0.5 MG X 11 & 1 MG X 42 tablet; Take 0.5 mg by mouth Daily for 3 days, THEN 0.5 mg Every 12 (Twelve) Hours for 4 days, THEN 1 mg Every 12 (Twelve) Hours for 21 days.  Dispense: 53 tablet; Refill: 0  -     varenicline (Chantix Continuing Month Wai) 1 MG tablet; Take 1 tablet by mouth 2 (Two) Times a Day.  Dispense: 60 tablet; Refill: 1  Patient to notify office with any acute concerns or issues.  Patient verbalizes understanding, agrees with plan of care and has no further questions upon discharge.    Please note that portions of this note were completed with a voice recognition program.         Follow Up   Return if symptoms worsen or fail to  improve.  Patient was given instructions and counseling regarding her condition or for health maintenance advice. Please see specific information pulled into the AVS if appropriate.

## 2023-03-17 ENCOUNTER — LAB (OUTPATIENT)
Dept: LAB | Facility: HOSPITAL | Age: 73
End: 2023-03-17
Payer: MEDICARE

## 2023-03-17 ENCOUNTER — TELEPHONE (OUTPATIENT)
Dept: FAMILY MEDICINE CLINIC | Age: 73
End: 2023-03-17

## 2023-03-17 DIAGNOSIS — R30.0 DYSURIA: ICD-10-CM

## 2023-03-17 DIAGNOSIS — E11.65 TYPE 2 DIABETES MELLITUS WITH HYPERGLYCEMIA, WITHOUT LONG-TERM CURRENT USE OF INSULIN: ICD-10-CM

## 2023-03-17 LAB
ALBUMIN SERPL-MCNC: 4 G/DL (ref 3.5–5.2)
ALBUMIN UR-MCNC: <1.2 MG/DL
ALBUMIN/GLOB SERPL: 1.3 G/DL
ALP SERPL-CCNC: 99 U/L (ref 39–117)
ALT SERPL W P-5'-P-CCNC: 13 U/L (ref 1–33)
ANION GAP SERPL CALCULATED.3IONS-SCNC: 10 MMOL/L (ref 5–15)
AST SERPL-CCNC: 15 U/L (ref 1–32)
BACTERIA UR QL AUTO: ABNORMAL /HPF
BILIRUB SERPL-MCNC: 0.4 MG/DL (ref 0–1.2)
BILIRUB UR QL STRIP: NEGATIVE
BUN SERPL-MCNC: 18 MG/DL (ref 8–23)
BUN/CREAT SERPL: 23.1 (ref 7–25)
CALCIUM SPEC-SCNC: 9.4 MG/DL (ref 8.6–10.5)
CHLORIDE SERPL-SCNC: 104 MMOL/L (ref 98–107)
CHOLEST SERPL-MCNC: 152 MG/DL (ref 0–200)
CLARITY UR: CLEAR
CO2 SERPL-SCNC: 29 MMOL/L (ref 22–29)
COLOR UR: YELLOW
CREAT SERPL-MCNC: 0.78 MG/DL (ref 0.57–1)
CREAT UR-MCNC: 132.3 MG/DL
EGFRCR SERPLBLD CKD-EPI 2021: 81.3 ML/MIN/1.73
GLOBULIN UR ELPH-MCNC: 3.1 GM/DL
GLUCOSE SERPL-MCNC: 180 MG/DL (ref 65–99)
GLUCOSE UR STRIP-MCNC: NEGATIVE MG/DL
HBA1C MFR BLD: 8.1 % (ref 4.8–5.6)
HDLC SERPL-MCNC: 41 MG/DL (ref 40–60)
HGB UR QL STRIP.AUTO: ABNORMAL
KETONES UR QL STRIP: NEGATIVE
LDLC SERPL CALC-MCNC: 82 MG/DL (ref 0–100)
LDLC/HDLC SERPL: 1.87 {RATIO}
LEUKOCYTE ESTERASE UR QL STRIP.AUTO: NEGATIVE
MICROALBUMIN/CREAT UR: NORMAL MG/G{CREAT}
NITRITE UR QL STRIP: NEGATIVE
PH UR STRIP.AUTO: 6 [PH] (ref 5–8)
POTASSIUM SERPL-SCNC: 4.1 MMOL/L (ref 3.5–5.2)
PROT SERPL-MCNC: 7.1 G/DL (ref 6–8.5)
PROT UR QL STRIP: NEGATIVE
RBC # UR STRIP: ABNORMAL /HPF
REF LAB TEST METHOD: ABNORMAL
SODIUM SERPL-SCNC: 143 MMOL/L (ref 136–145)
SP GR UR STRIP: >=1.03 (ref 1–1.03)
SQUAMOUS #/AREA URNS HPF: ABNORMAL /HPF
TRIGL SERPL-MCNC: 171 MG/DL (ref 0–150)
UROBILINOGEN UR QL STRIP: ABNORMAL
VLDLC SERPL-MCNC: 29 MG/DL (ref 5–40)
WBC # UR STRIP: ABNORMAL /HPF

## 2023-03-17 PROCEDURE — 80061 LIPID PANEL: CPT

## 2023-03-17 PROCEDURE — 83036 HEMOGLOBIN GLYCOSYLATED A1C: CPT

## 2023-03-17 PROCEDURE — 36415 COLL VENOUS BLD VENIPUNCTURE: CPT

## 2023-03-17 PROCEDURE — 81001 URINALYSIS AUTO W/SCOPE: CPT

## 2023-03-17 PROCEDURE — 82043 UR ALBUMIN QUANTITATIVE: CPT

## 2023-03-17 PROCEDURE — 80053 COMPREHEN METABOLIC PANEL: CPT

## 2023-03-17 PROCEDURE — 82570 ASSAY OF URINE CREATININE: CPT

## 2023-03-17 NOTE — TELEPHONE ENCOUNTER
Caller: Zaria Howard    Relationship: Self    Best call back number: [phone]     What was the call regarding:       PATIENT SAID SHE IS WANTING A RETURN CALL FROM [facility].

## 2023-03-17 NOTE — TELEPHONE ENCOUNTER
Caller: Zaria Howard    Relationship: Self    Best call back number: [phone]    What is the best time to reach you: ANY    Who are you requesting to speak with (clinical staff, provider,  specific staff member): CLINICAL    Do you know the name of the person who called: UNKNOWN    What was the call regarding: PATIENT STATED THAT SHE MISSED A CALL AND NEEDS SOMEONE TO CALL HER BACK AS SOON AS POSSIBLE.    Do you require a callback: YES

## 2023-03-20 RX ORDER — DULAGLUTIDE 3 MG/.5ML
3 INJECTION, SOLUTION SUBCUTANEOUS
Qty: 6 ML | Refills: 1 | Status: SHIPPED | OUTPATIENT
Start: 2023-03-20

## 2023-03-27 RX ORDER — HYDROCHLOROTHIAZIDE 25 MG/1
TABLET ORAL
Qty: 90 TABLET | Refills: 1 | Status: SHIPPED | OUTPATIENT
Start: 2023-03-27

## 2023-04-05 RX ORDER — METOPROLOL SUCCINATE 25 MG/1
TABLET, EXTENDED RELEASE ORAL
Qty: 90 TABLET | Refills: 1 | Status: SHIPPED | OUTPATIENT
Start: 2023-04-05

## 2023-04-24 RX ORDER — ATORVASTATIN CALCIUM 10 MG/1
TABLET, FILM COATED ORAL
Qty: 90 TABLET | Refills: 1 | Status: SHIPPED | OUTPATIENT
Start: 2023-04-24

## 2023-05-08 ENCOUNTER — OFFICE VISIT (OUTPATIENT)
Dept: UROLOGY | Facility: CLINIC | Age: 73
End: 2023-05-08
Payer: MEDICARE

## 2023-05-08 VITALS — WEIGHT: 161.6 LBS | BODY MASS INDEX: 29.74 KG/M2 | HEIGHT: 62 IN | RESPIRATION RATE: 16 BRPM

## 2023-05-08 DIAGNOSIS — R31.9 HEMATURIA, UNSPECIFIED TYPE: Primary | ICD-10-CM

## 2023-05-08 PROBLEM — H25.813 COMBINED FORMS OF AGE-RELATED CATARACT OF BOTH EYES: Status: ACTIVE | Noted: 2020-10-12

## 2023-05-08 LAB
BILIRUB BLD-MCNC: NEGATIVE MG/DL
CLARITY, POC: CLEAR
COLOR UR: YELLOW
EXPIRATION DATE: NORMAL
GLUCOSE UR STRIP-MCNC: NEGATIVE MG/DL
KETONES UR QL: NEGATIVE
LEUKOCYTE EST, POC: NEGATIVE
Lab: NORMAL
NITRITE UR-MCNC: NEGATIVE MG/ML
PH UR: 6.5 [PH] (ref 5–8)
PROT UR STRIP-MCNC: NEGATIVE MG/DL
RBC # UR STRIP: NEGATIVE /UL
SP GR UR: 1.01 (ref 1–1.03)
UROBILINOGEN UR QL: NORMAL

## 2023-05-08 PROCEDURE — 81003 URINALYSIS AUTO W/O SCOPE: CPT | Performed by: NURSE PRACTITIONER

## 2023-05-08 PROCEDURE — 99213 OFFICE O/P EST LOW 20 MIN: CPT | Performed by: NURSE PRACTITIONER

## 2023-05-08 PROCEDURE — 1159F MED LIST DOCD IN RCRD: CPT | Performed by: NURSE PRACTITIONER

## 2023-05-08 PROCEDURE — 1160F RVW MEDS BY RX/DR IN RCRD: CPT | Performed by: NURSE PRACTITIONER

## 2023-05-08 NOTE — PROGRESS NOTES
Chief Complaint: Blood in Urine    Subjective         History of Present Illness  Zaria Howard is a 73 y.o. female presents to [facility] UROLOG[facility] to be seen for hematuria.    She saw her pcp for annual medicare wellness and had c/o dysuria.  She states this resolved within 3 days with no intervention however she does state intermittent issues with vaginal dryness and itching.    She was noted to have trace blood in her urine PCP noted that this has been an ongoing issue for some time.     Review of her urinalyses from 2019 until now reveal 6-10 red blood cells per high-powered field from 3/6/2029 followed by 7 urinalyses from April 2019 until March 2023 that revealed 0-2 red blood cells per high-powered field.    She has never seen blood in the urine.    She is a smoker 1/2 PPD >30 years.    No family hx of  malignancies.    She is not on any anticoagulants.        Objective     Past Medical History:   Diagnosis Date   • Diabetes mellitus    • Hyperlipidemia    • Hypertension        History reviewed. No pertinent surgical history.      Current Outpatient Medications:   •  Accu-Chek FastClix Lancets misc, USE TO TEST BLOOD SUGAR TWICE DAILY, Disp: 102 each, Rfl: 5  •  aspirin 81 MG EC tablet, Take 1 tablet by mouth Daily., Disp: , Rfl:   •  atorvastatin (LIPITOR) 10 MG tablet, TAKE 1 TABLET BY MOUTH ONCE DAILY, Disp: 90 tablet, Rfl: 1  •  Dulaglutide (Trulicity) 3 MG/0.5ML solution pen-injector, Inject 0.5 mL under the skin into the appropriate area as directed Every 7 (Seven) Days., Disp: 6 mL, Rfl: 1  •  fluticasone (Flonase) 50 MCG/ACT nasal spray, 2 sprays into the nostril(s) as directed by provider Daily., Disp: 18 mL, Rfl: 5  •  glucose blood test strip, Check BS BID Dx E11.9, Disp: 300 each, Rfl: 3  •  glucose monitor monitoring kit, 1 each 2 (Two) Times a Day. Check BS BID DX E11.9, Disp: 1 each, Rfl: 0  •  hydroCHLOROthiazide (HYDRODIURIL) 25 MG tablet, Take 1 tablet BY MOUTH EVERY DAY,  "Disp: 90 tablet, Rfl: 1  •  isosorbide mononitrate (IMDUR) 30 MG 24 hr tablet, Take 1 tablet by mouth Daily., Disp: 90 tablet, Rfl: 3  •  meclizine (ANTIVERT) 12.5 MG tablet, TAKE 2 TABLETS BY ORAL ROUTE 1 HOUR BEFORE EXPOSURE TO MOTION, Disp: 30 tablet, Rfl: 0  •  metoprolol succinate XL (TOPROL-XL) 25 MG 24 hr tablet, Take 1 tablet BY MOUTH ONCE DAILY, Disp: 90 tablet, Rfl: 1  •  nitroglycerin (NITROSTAT) 0.4 MG SL tablet, DISSOLVE 1 UNDER TONGUE  EVERY 5 MINUTES FOR 3 DOSES; IF NO RELIEF GO TO ER, Disp: 25 tablet, Rfl: 2  •  ondansetron ODT (ZOFRAN-ODT) 4 MG disintegrating tablet, Place 1 tablet on the tongue As Needed for Nausea or Vomiting., Disp: , Rfl:     Allergies   Allergen Reactions   • Diphenhydramine Other (See Comments)     NUMB IN FACE   • Sudafed [Pseudoephedrine Hcl] Swelling        History reviewed. No pertinent family history.    Social History     Socioeconomic History   • Marital status:    Tobacco Use   • Smoking status: Every Day     Packs/day: 0.50     Years: 39.00     Pack years: 19.50     Types: Cigarettes     Start date: 1984   • Smokeless tobacco: Never   Vaping Use   • Vaping Use: Never used   Substance and Sexual Activity   • Alcohol use: Never   • Drug use: Never   • Sexual activity: Defer       Vital Signs:   Resp 16   Ht 157.5 cm (62\")   Wt 73.3 kg (161 lb 9.6 oz)   BMI 29.56 kg/m²      Physical Exam     Result Review :   The following data was reviewed by: DOLORES Summers on 05/08/2023:  Results for orders placed or performed in visit on 05/08/23   POC Urinalysis Dipstick, Automated    Specimen: Urine   Result Value Ref Range    Color Yellow Yellow, Straw, Dark Yellow, Jane    Clarity, UA Clear Clear    Specific Gravity  1.015 1.005 - 1.030    pH, Urine 6.5 5.0 - 8.0    Leukocytes Negative Negative    Nitrite, UA Negative Negative    Protein, POC Negative Negative mg/dL    Glucose, UA Negative Negative mg/dL    Ketones, UA Negative Negative    Urobilinogen, UA " "Normal Normal, 0.2 E.U./dL    Bilirubin Negative Negative    Blood, UA Negative Negative    Lot Number [id number]     Expiration Date 4/2,024             Procedures        Assessment and Plan    Diagnoses and all orders for this visit:    1. Hematuria, unspecified type (Primary)  -     POC Urinalysis Dipstick, Automated  -     Urinalysis With Microscopic - Urine, Clean Catch; Future      Reviewed the American urologic guidelines for asymptomatic microscopic hematuria with patient.  Discussed that asymptomatic microscopic hematuria is defined as greater than 3-5 RBCs on high-powered field on a urinalysis in the absence of an obvious benign cause.  Most importantly \"a positive dipstick does not define asymptomatic microscopic hematuria, and evaluation should be based solely on findings for microscopic examination of urinary sediment and not on the dipstick reading.\"  Discussed that a positive dipstick reading merits microscopic evaluation to confirm or refute the diagnosis of asymptomatic microscopic hematuria.     Patient with no blood noted on urine dip today.  Prior urinalysis with microscopic examination of urine reviewed and found to be with only 0-2 red blood cells per high-powered field aside from a single urinalysis back in 2019.       Did offer given patient's age to proceed with further work-up further work-up would include cystoscopy and upper tract evaluation.  Patient declines at this point time.    Discussed with the patient as well given her recurrent vaginal irritation potentially placing her on vaginal Estrace cream or using over-the-counter preparations such as coconut oil, [place] harvest for vaginal irritation associated with atrophic vaginitis.     Patient declines vaginal Estrace.          I spent 15 minutes caring for Zaria on this date of service. This time includes time spent by me in the following activities:reviewing tests, obtaining and/or reviewing a separately obtained history, performing a " medically appropriate examination and/or evaluation , counseling and educating the patient/family/caregiver, ordering medications, tests, or procedures, and documenting information in the medical record  Follow Up   Return in about 6 months (around 11/8/2023) for f/u urinalysisi with micro .  Patient was given instructions and counseling regarding her condition or for health maintenance advice. Please see specific information pulled into the AVS if appropriate.         This document has been electronically signed by DOLORES Summers  May 8, 2023 09:37 EDT

## 2023-09-01 DIAGNOSIS — R07.9 CHEST PAIN, UNSPECIFIED TYPE: ICD-10-CM

## 2023-09-01 RX ORDER — ISOSORBIDE MONONITRATE 30 MG/1
TABLET, EXTENDED RELEASE ORAL
Qty: 90 TABLET | Refills: 3 | Status: SHIPPED | OUTPATIENT
Start: 2023-09-01

## 2023-09-06 ENCOUNTER — OFFICE VISIT (OUTPATIENT)
Dept: CARDIOLOGY | Facility: CLINIC | Age: 73
End: 2023-09-06
Payer: MEDICARE

## 2023-09-06 VITALS
HEART RATE: 73 BPM | HEIGHT: 62 IN | SYSTOLIC BLOOD PRESSURE: 134 MMHG | DIASTOLIC BLOOD PRESSURE: 51 MMHG | BODY MASS INDEX: 29.22 KG/M2 | WEIGHT: 158.8 LBS

## 2023-09-06 DIAGNOSIS — R07.9 CHEST PAIN, UNSPECIFIED TYPE: ICD-10-CM

## 2023-09-06 DIAGNOSIS — I10 ESSENTIAL HYPERTENSION: ICD-10-CM

## 2023-09-06 DIAGNOSIS — E78.2 MIXED HYPERLIPIDEMIA: Primary | ICD-10-CM

## 2023-09-06 PROCEDURE — 1159F MED LIST DOCD IN RCRD: CPT | Performed by: INTERNAL MEDICINE

## 2023-09-06 PROCEDURE — 3075F SYST BP GE 130 - 139MM HG: CPT | Performed by: INTERNAL MEDICINE

## 2023-09-06 PROCEDURE — 1160F RVW MEDS BY RX/DR IN RCRD: CPT | Performed by: INTERNAL MEDICINE

## 2023-09-06 PROCEDURE — 99214 OFFICE O/P EST MOD 30 MIN: CPT | Performed by: INTERNAL MEDICINE

## 2023-09-06 PROCEDURE — 3078F DIAST BP <80 MM HG: CPT | Performed by: INTERNAL MEDICINE

## 2023-09-06 RX ORDER — NITROGLYCERIN 0.4 MG/1
0.4 TABLET SUBLINGUAL
Qty: 25 TABLET | Refills: 2 | Status: SHIPPED | OUTPATIENT
Start: 2023-09-06

## 2023-09-06 RX ORDER — METOPROLOL SUCCINATE 25 MG/1
25 TABLET, EXTENDED RELEASE ORAL DAILY
Qty: 90 TABLET | Refills: 3 | Status: SHIPPED | OUTPATIENT
Start: 2023-09-06

## 2023-09-06 NOTE — ASSESSMENT & PLAN NOTE
Most recent LDL is 56, just at goal.  Total cholesterol and triglycerides are also within normal limits.  Recommend to continue atorvastatin 10 mg nightly.

## 2023-09-06 NOTE — ASSESSMENT & PLAN NOTE
Blood pressure is very well controlled.  Continue metoprolol, and hydrochlorothiazide at the current dose.

## 2023-09-06 NOTE — PROGRESS NOTES
CARDIOLOGY FOLLOW-UP PROGRESS NOTE        Chief Complaint  Follow-up, Hypertension, and Hyperlipidemia    Subjective            Zaria Howard presents to [facility] CARDIOLOG[facility]  History of Present Illness      Ms Howard is here for annual follow-up visit.  She denies any new complaints at this time.  She has not had any recent episodes of chest pain.  Denies shortness of breath, palpitations or dizziness.  She is very active at baseline.  She used sublingual nitroglycerin once in the past 6 months at the time of stress.    Past History:    1) Hypertension; 2) Chest pain with a stress test showing small apical ischemia, opted for medical management; 3) Diabetes mellitus, on oral agents; 4) Hypothyroidism.     Medical History:  Past Medical History:   Diagnosis Date    Diabetes mellitus     Hyperlipidemia     Hypertension        Surgical History: has no past surgical history on file.     Family History: Reviewed, no family history of premature coronary disease    Social History: reports that she has been smoking cigarettes. She started smoking about 39 years ago. She has a 19.50 pack-year smoking history. She has never been exposed to tobacco smoke. She has never used smokeless tobacco. She reports that she does not drink alcohol and does not use drugs.    Allergies: Diphenhydramine and Sudafed [pseudoephedrine hcl]    Current Outpatient Medications on File Prior to Visit   Medication Sig    Accu-Chek FastClix Lancets misc USE TO TEST BLOOD SUGAR TWICE DAILY    albuterol sulfate  (90 Base) MCG/ACT inhaler Inhale 1 puff.    aspirin 81 MG EC tablet Take 1 tablet by mouth Daily.    atorvastatin (LIPITOR) 10 MG tablet TAKE 1 TABLET BY MOUTH ONCE DAILY    Dulaglutide (Trulicity) 3 MG/0.5ML solution pen-injector Inject 0.5 mL under the skin into the appropriate area as directed Every 7 (Seven) Days.    glucose blood test strip Check BS BID Dx E11.9    glucose monitor monitoring kit 1 each 2 (Two)  "Times a Day. Check BS BID DX E11.9    hydroCHLOROthiazide (HYDRODIURIL) 25 MG tablet Take 1 tablet BY MOUTH EVERY DAY    isosorbide mononitrate (IMDUR) 30 MG 24 hr tablet Take 1 tablet BY MOUTH EVERY DAY    meclizine (ANTIVERT) 12.5 MG tablet TAKE 2 TABLETS BY ORAL ROUTE 1 HOUR BEFORE EXPOSURE TO MOTION    ondansetron (ZOFRAN) 4 MG tablet Take 1 tablet by mouth.     metoprolol succinate XL (TOPROL-XL) 25 MG 24 hr tablet Take 1 tablet BY MOUTH ONCE DAILY    nitroglycerin (NITROSTAT) 0.4 MG SL tablet DISSOLVE 1 UNDER TONGUE  EVERY 5 MINUTES FOR 3 DOSES; IF NO RELIEF GO TO ER           Review of Systems   Respiratory:  Negative for cough, shortness of breath and wheezing.    Cardiovascular:  Negative for chest pain, palpitations and leg swelling.   Gastrointestinal:  Negative for nausea and vomiting.   Neurological:  Negative for dizziness and syncope.      Objective     /51   Pulse 73   Ht 157.5 cm (62\")   Wt 72 kg (158 lb 12.8 oz)   BMI 29.04 kg/m²       Physical Exam    General : Alert, awake, no acute distress  Neck : Supple, no carotid bruit, no jugular venous distention  CVS : Regular rate and rhythm, no murmur, rubs or gallops  Lungs: Clear to auscultation bilaterally, no crackles or rhonchi  Abdomen: Soft, nontender, bowel sounds heard in all 4 quadrants  Extremities: Warm, well-perfused, no pedal edema    Result Review :     The following data was reviewed by: Jose Elias Calderon MD on 09/06/2023:    CMP          3/17/2023    07:34 6/30/2023    09:13   CMP   Glucose 180  151    BUN 18  21    Creatinine 0.78  0.78    EGFR 81.3  80.8    Sodium 143  141    Potassium 4.1  3.5    Chloride 104  102    Calcium 9.4  9.9    Total Protein 7.1  6.8    Albumin 4.0  4.4    Globulin 3.1  2.4    Total Bilirubin 0.4  0.4    Alkaline Phosphatase 99  88    AST (SGOT) 15  9    ALT (SGPT) 13  10    Albumin/Globulin Ratio 1.3  1.8    BUN/Creatinine Ratio 23.1  26.9    Anion Gap 10.0  12.0          Lipid Panel          " 3/17/2023    07:34 6/30/2023    09:13   Lipid Panel   Total Cholesterol 152  116    Triglycerides 171  133    HDL Cholesterol 41  36    VLDL Cholesterol 29  24    LDL Cholesterol  82  56    LDL/HDL Ratio 1.87  1.48           Data reviewed: Cardiology studies      Echocardiogram done on 11/5/2018 showed    1. Normal left ventricular systolic function with an estimated LV ejection fraction of 55%.  2. Mild to moderate mitral regurgitation.  3. Moderate left atrial enlargement.                Assessment and Plan        Diagnoses and all orders for this visit:    1. Mixed hyperlipidemia (Primary)  Assessment & Plan:  Most recent LDL is 56, just at goal.  Total cholesterol and triglycerides are also within normal limits.  Recommend to continue atorvastatin 10 mg nightly.      2. Chest pain, unspecified type  Assessment & Plan:  She has not had any episodes of chest pain for the past several years.  A SPECT stress test in 2018 showed mild reversible ischemia, but patient opted for medical management.  LV systolic function is preserved.  We will continue with isosorbide mononitrate along with aspirin, beta-blocker and statins.    Orders:  -     nitroglycerin (NITROSTAT) 0.4 MG SL tablet; Place 1 tablet under the tongue Every 5 (Five) Minutes As Needed for Chest Pain. Take no more than 3 doses in 15 minutes.  Dispense: 25 tablet; Refill: 2    3. Essential hypertension  Assessment & Plan:  Blood pressure is very well controlled.  Continue metoprolol, and hydrochlorothiazide at the current dose.    Orders:  -     metoprolol succinate XL (TOPROL-XL) 25 MG 24 hr tablet; Take 1 tablet by mouth Daily.  Dispense: 90 tablet; Refill: 3              Follow Up     Return in about 1 year (around 9/6/2024) for Next scheduled follow up.    Patient was given instructions and counseling regarding her condition or for health maintenance advice. Please see specific information pulled into the AVS if appropriate.

## 2023-09-06 NOTE — ASSESSMENT & PLAN NOTE
She has not had any episodes of chest pain for the past several years.  A SPECT stress test in 2018 showed mild reversible ischemia, but patient opted for medical management.  LV systolic function is preserved.  We will continue with isosorbide mononitrate along with aspirin, beta-blocker and statins.

## 2023-09-14 RX ORDER — HYDROCHLOROTHIAZIDE 25 MG/1
TABLET ORAL
Qty: 90 TABLET | Refills: 1 | Status: SHIPPED | OUTPATIENT
Start: 2023-09-14

## 2023-09-21 ENCOUNTER — LAB (OUTPATIENT)
Dept: LAB | Facility: HOSPITAL | Age: 73
End: 2023-09-21

## 2023-09-21 ENCOUNTER — OFFICE VISIT (OUTPATIENT)
Dept: FAMILY MEDICINE CLINIC | Age: 73
End: 2023-09-21
Payer: MEDICARE

## 2023-09-21 VITALS
HEART RATE: 67 BPM | BODY MASS INDEX: 29.52 KG/M2 | WEIGHT: 160.4 LBS | TEMPERATURE: 98.1 F | SYSTOLIC BLOOD PRESSURE: 115 MMHG | DIASTOLIC BLOOD PRESSURE: 76 MMHG | HEIGHT: 62 IN

## 2023-09-21 DIAGNOSIS — I10 ESSENTIAL HYPERTENSION: Primary | ICD-10-CM

## 2023-09-21 DIAGNOSIS — E78.2 MIXED HYPERLIPIDEMIA: ICD-10-CM

## 2023-09-21 DIAGNOSIS — E11.65 TYPE 2 DIABETES MELLITUS WITH HYPERGLYCEMIA, WITHOUT LONG-TERM CURRENT USE OF INSULIN: ICD-10-CM

## 2023-09-21 DIAGNOSIS — Z72.0 TOBACCO ABUSE: ICD-10-CM

## 2023-09-21 DIAGNOSIS — I10 ESSENTIAL HYPERTENSION: ICD-10-CM

## 2023-09-21 DIAGNOSIS — R31.9 HEMATURIA, UNSPECIFIED TYPE: ICD-10-CM

## 2023-09-21 PROBLEM — R07.9 CHEST PAIN: Status: RESOLVED | Noted: 2022-08-30 | Resolved: 2023-09-21

## 2023-09-21 PROBLEM — H69.91 DYSFUNCTION OF RIGHT EUSTACHIAN TUBE: Status: RESOLVED | Noted: 2022-07-18 | Resolved: 2023-09-21

## 2023-09-21 PROBLEM — H69.81 DYSFUNCTION OF RIGHT EUSTACHIAN TUBE: Status: RESOLVED | Noted: 2022-07-18 | Resolved: 2023-09-21

## 2023-09-21 PROBLEM — K59.00 CONSTIPATION: Status: RESOLVED | Noted: 2022-07-18 | Resolved: 2023-09-21

## 2023-09-21 PROBLEM — R10.10 PAIN OF UPPER ABDOMEN: Status: RESOLVED | Noted: 2022-07-18 | Resolved: 2023-09-21

## 2023-09-21 LAB
ALBUMIN SERPL-MCNC: 4.1 G/DL (ref 3.5–5.2)
ALBUMIN/GLOB SERPL: 1.6 G/DL
ALP SERPL-CCNC: 92 U/L (ref 39–117)
ALT SERPL W P-5'-P-CCNC: 12 U/L (ref 1–33)
ANION GAP SERPL CALCULATED.3IONS-SCNC: 13 MMOL/L (ref 5–15)
AST SERPL-CCNC: 15 U/L (ref 1–32)
BACTERIA UR QL AUTO: ABNORMAL /HPF
BILIRUB SERPL-MCNC: 0.4 MG/DL (ref 0–1.2)
BILIRUB UR QL STRIP: NEGATIVE
BUN SERPL-MCNC: 18 MG/DL (ref 8–23)
BUN/CREAT SERPL: 22 (ref 7–25)
CALCIUM SPEC-SCNC: 9.5 MG/DL (ref 8.6–10.5)
CHLORIDE SERPL-SCNC: 102 MMOL/L (ref 98–107)
CHOLEST SERPL-MCNC: 136 MG/DL (ref 0–200)
CLARITY UR: CLEAR
CO2 SERPL-SCNC: 25 MMOL/L (ref 22–29)
COLOR UR: YELLOW
CREAT SERPL-MCNC: 0.82 MG/DL (ref 0.57–1)
EGFRCR SERPLBLD CKD-EPI 2021: 76.1 ML/MIN/1.73
GLOBULIN UR ELPH-MCNC: 2.6 GM/DL
GLUCOSE SERPL-MCNC: 181 MG/DL (ref 65–99)
GLUCOSE UR STRIP-MCNC: NEGATIVE MG/DL
HBA1C MFR BLD: 7.3 % (ref 4.8–5.6)
HDLC SERPL-MCNC: 38 MG/DL (ref 40–60)
HGB UR QL STRIP.AUTO: ABNORMAL
KETONES UR QL STRIP: NEGATIVE
LDLC SERPL CALC-MCNC: 68 MG/DL (ref 0–100)
LDLC/HDLC SERPL: 1.63 {RATIO}
LEUKOCYTE ESTERASE UR QL STRIP.AUTO: NEGATIVE
NITRITE UR QL STRIP: NEGATIVE
PH UR STRIP.AUTO: 5.5 [PH] (ref 5–8)
POTASSIUM SERPL-SCNC: 4 MMOL/L (ref 3.5–5.2)
PROT SERPL-MCNC: 6.7 G/DL (ref 6–8.5)
PROT UR QL STRIP: NEGATIVE
RBC # UR STRIP: ABNORMAL /HPF
REF LAB TEST METHOD: ABNORMAL
SODIUM SERPL-SCNC: 140 MMOL/L (ref 136–145)
SP GR UR STRIP: 1.02 (ref 1–1.03)
SQUAMOUS #/AREA URNS HPF: ABNORMAL /HPF
TRIGL SERPL-MCNC: 180 MG/DL (ref 0–150)
TSH SERPL DL<=0.05 MIU/L-ACNC: 3.57 UIU/ML (ref 0.27–4.2)
UROBILINOGEN UR QL STRIP: ABNORMAL
VLDLC SERPL-MCNC: 30 MG/DL (ref 5–40)
WBC # UR STRIP: ABNORMAL /HPF

## 2023-09-21 PROCEDURE — 1159F MED LIST DOCD IN RCRD: CPT | Performed by: NURSE PRACTITIONER

## 2023-09-21 PROCEDURE — 83036 HEMOGLOBIN GLYCOSYLATED A1C: CPT

## 2023-09-21 PROCEDURE — 3051F HG A1C>EQUAL 7.0%<8.0%: CPT | Performed by: NURSE PRACTITIONER

## 2023-09-21 PROCEDURE — 36415 COLL VENOUS BLD VENIPUNCTURE: CPT

## 2023-09-21 PROCEDURE — 99214 OFFICE O/P EST MOD 30 MIN: CPT | Performed by: NURSE PRACTITIONER

## 2023-09-21 PROCEDURE — 80053 COMPREHEN METABOLIC PANEL: CPT

## 2023-09-21 PROCEDURE — 80061 LIPID PANEL: CPT

## 2023-09-21 PROCEDURE — 3078F DIAST BP <80 MM HG: CPT | Performed by: NURSE PRACTITIONER

## 2023-09-21 PROCEDURE — 1160F RVW MEDS BY RX/DR IN RCRD: CPT | Performed by: NURSE PRACTITIONER

## 2023-09-21 PROCEDURE — 84443 ASSAY THYROID STIM HORMONE: CPT

## 2023-09-21 PROCEDURE — 81001 URINALYSIS AUTO W/SCOPE: CPT

## 2023-09-21 PROCEDURE — 3074F SYST BP LT 130 MM HG: CPT | Performed by: NURSE PRACTITIONER

## 2023-09-21 NOTE — ASSESSMENT & PLAN NOTE
Continue Trulicity 3 mg every 7 days.  Potential side effects medication discussed.  TSH ordered as requested.  Checking A1c.

## 2023-09-21 NOTE — ASSESSMENT & PLAN NOTE
Continue atorvastatin 10 mg daily.  Will adjust medication if needed.  Diet modifications and routine exercise encouraged.

## 2023-09-21 NOTE — PROGRESS NOTES
"Chief Complaint  Diabetes, Hypertension, and Hyperlipidemia    Subjective          Zaria Howard presents to [facility] for routine follow-up.  Patient is taking Trulicity 3 mg weekly for diabetes.  HCTZ 25 mg daily and Toprol-XL 25 mg daily for hypertension.  Atorvastatin 10 mg daily for hyperlipidemia.  Patient would like TSH checked due to GLP-1.  Patient denies family history of thyroid cancer.  Patient does not have any acute concerns or issues at this time.  Patient still declines preventative screening, such as DEXA scan, mammogram, colorectal cancer screening, lung cancer screening.    Objective   Vital Signs:   Vitals:    09/21/23 0824   BP: 115/76   BP Location: Left arm   Patient Position: Sitting   Pulse: 67   Temp: 98.1 °F (36.7 °C)   TempSrc: Oral   Weight: 72.8 kg (160 lb 6.4 oz)   Height: 157.5 cm (62\")       Physical Exam  Vitals reviewed.   Constitutional:       General: She is not in acute distress.     Appearance: Normal appearance. She is well-developed.   HENT:      Head: Normocephalic and atraumatic.   Eyes:      Conjunctiva/sclera: Conjunctivae normal.      Pupils: Pupils are equal, round, and reactive to light.   Cardiovascular:      Rate and Rhythm: Normal rate and regular rhythm.      Heart sounds: Normal heart sounds. No murmur heard.  Pulmonary:      Effort: Pulmonary effort is normal. No respiratory distress.      Breath sounds: Normal breath sounds.   Skin:     General: Skin is warm and dry.   Neurological:      Mental Status: She is alert and oriented to person, place, and time.   Psychiatric:         Mood and Affect: Mood and affect normal.         Behavior: Behavior normal.         Thought Content: Thought content normal.         Judgment: Judgment normal.        Result Review :   The following data was reviewed by: DOLORES Streeter on 09/21/2023:  Hemoglobin A1c (06/30/2023 09:13)  Lipid Panel (06/30/2023 09:13)  Comprehensive Metabolic Panel " (06/30/2023 09:13)         Assessment and Plan    Diagnoses and all orders for this visit:    1. Essential hypertension (Primary)  Assessment & Plan:  Stable.  Continue HCTZ 25 mg and metoprolol XL 25 mg daily.  Continue follow-up with cardiology.      Orders:  -     TSH Rfx On Abnormal To Free T4; Future    2. Type 2 diabetes mellitus with hyperglycemia, without long-term current use of insulin  Assessment & Plan:  Continue Trulicity 3 mg every 7 days.  Potential side effects medication discussed.  TSH ordered as requested.  Checking A1c.      Orders:  -     Comprehensive Metabolic Panel; Future  -     Lipid Panel; Future  -     Hemoglobin A1c; Future    3. Tobacco abuse  Assessment & Plan:  Patient strongly encouraged to consider tobacco cessation.  Potential health complications discussed.  Patient is aware.  Patient is not interested in cessation at this time.  Encouraged patient to reach out to the office when they are ready to quit.      4. Mixed hyperlipidemia  Assessment & Plan:  Continue atorvastatin 10 mg daily.  Will adjust medication if needed.  Diet modifications and routine exercise encouraged.        Patient to notify office with any acute concerns or issues.  Patient verbalizes understanding, agrees with plan of care and has no further questions upon discharge.    Please note that portions of this note were completed with a voice recognition program.    Follow Up    Return in about 3 months (around 12/21/2023) for Recheck.  Patient was given instructions and counseling regarding her condition or for health maintenance advice. Please see specific information pulled into the AVS if appropriate.

## 2023-09-21 NOTE — ASSESSMENT & PLAN NOTE
Stable.  Continue HCTZ 25 mg and metoprolol XL 25 mg daily.  Continue follow-up with cardiology.

## 2023-09-22 ENCOUNTER — TELEPHONE (OUTPATIENT)
Dept: FAMILY MEDICINE CLINIC | Age: 73
End: 2023-09-22

## 2023-09-22 NOTE — TELEPHONE ENCOUNTER
Caller: Zaria Howard    Relationship: Self    Best call back number: [phone]     Caller requesting test results: SELF    What test was performed: BLOOD WORK    When was the test performed: 9/21/23    Where was the test performed: [facility]

## 2023-09-26 ENCOUNTER — TELEPHONE (OUTPATIENT)
Dept: FAMILY MEDICINE CLINIC | Age: 73
End: 2023-09-26

## 2023-09-26 RX ORDER — DULAGLUTIDE 3 MG/.5ML
3 INJECTION, SOLUTION SUBCUTANEOUS
Qty: 6 ML | Refills: 1 | Status: SHIPPED | OUTPATIENT
Start: 2023-09-26

## 2023-09-26 NOTE — TELEPHONE ENCOUNTER
Caller: Zaria Howard    Relationship: Self    Best call back number: [phone]    What is the best time to reach you: ANYTIME    Who are you requesting to speak with (clinical staff, provider,  specific staff member): NURSE     What was the call regarding: WOULD LIKE A CALL BACK REGARDING A TEST THAT NO ONE TOLD HER ABOUT

## 2023-10-23 RX ORDER — ATORVASTATIN CALCIUM 10 MG/1
TABLET, FILM COATED ORAL
Qty: 90 TABLET | Refills: 1 | Status: SHIPPED | OUTPATIENT
Start: 2023-10-23

## 2023-11-08 ENCOUNTER — OFFICE VISIT (OUTPATIENT)
Dept: UROLOGY | Facility: CLINIC | Age: 73
End: 2023-11-08
Payer: MEDICARE

## 2023-11-08 VITALS
DIASTOLIC BLOOD PRESSURE: 50 MMHG | WEIGHT: 159.8 LBS | SYSTOLIC BLOOD PRESSURE: 138 MMHG | BODY MASS INDEX: 30.17 KG/M2 | HEIGHT: 61 IN | HEART RATE: 66 BPM

## 2023-11-08 DIAGNOSIS — R31.9 HEMATURIA, UNSPECIFIED TYPE: Primary | ICD-10-CM

## 2023-11-08 LAB
BILIRUB BLD-MCNC: NEGATIVE MG/DL
CLARITY, POC: CLEAR
COLOR UR: YELLOW
EXPIRATION DATE: ABNORMAL
GLUCOSE UR STRIP-MCNC: NEGATIVE MG/DL
KETONES UR QL: NEGATIVE
LEUKOCYTE EST, POC: NEGATIVE
Lab: ABNORMAL
NITRITE UR-MCNC: NEGATIVE MG/ML
PH UR: 5.5 [PH] (ref 5–8)
PROT UR STRIP-MCNC: NEGATIVE MG/DL
RBC # UR STRIP: ABNORMAL /UL
SP GR UR: 1.03 (ref 1–1.03)
UROBILINOGEN UR QL: ABNORMAL

## 2023-11-08 NOTE — PROGRESS NOTES
Chief Complaint: Follow-up (Pt states that she is here to follow up for hematuria. States she doesn't have any symptoms today)    Subjective         History of Present Illness  Zaria Howard is a 73 y.o. female presents to [facility] to be seen for f/u hematuria.      She states no further issues with dysuria.     She had a urinalysis in September which again revealed 0-2 red blood cells per high-powered field.    She has not seen any blood in her urine has no urinary complaints today.    Previous:    She saw her pcp for annual medicare wellness and had c/o dysuria.  She states this resolved within 3 days with no intervention however she does state intermittent issues with vaginal dryness and itching.    She was noted to have trace blood in her urine PCP noted that this has been an ongoing issue for some time.     Review of her urinalyses from 2019 until now reveal 6-10 red blood cells per high-powered field from 3/6/2019 followed by 7 urinalyses from April 2019 until March 2023 that revealed 0-2 red blood cells per high-powered field.    She has never seen blood in the urine.    She is a smoker 1/2 PPD >30 years.    No family hx of  malignancies.    She is not on any anticoagulants.        Objective     Past Medical History:   Diagnosis Date    Diabetes mellitus     Hyperlipidemia     Hypertension        History reviewed. No pertinent surgical history.      Current Outpatient Medications:     Accu-Chek FastClix Lancets misc, USE TO TEST BLOOD SUGAR TWICE DAILY, Disp: 102 each, Rfl: 5    albuterol sulfate  (90 Base) MCG/ACT inhaler, Inhale 1 puff., Disp: , Rfl:     aspirin 81 MG EC tablet, Take 1 tablet by mouth Daily., Disp: , Rfl:     atorvastatin (LIPITOR) 10 MG tablet, TAKE 1 TABLET BY MOUTH ONCE DAILY, Disp: 90 tablet, Rfl: 1    Dulaglutide (Trulicity) 3 MG/0.5ML solution pen-injector, Inject 0.5 mL under the skin into the appropriate area as directed Every 7 (Seven) Days.,  "Disp: 6 mL, Rfl: 1    glucose blood test strip, Check BS BID Dx E11.9, Disp: 300 each, Rfl: 3    glucose monitor monitoring kit, 1 each 2 (Two) Times a Day. Check BS BID DX E11.9, Disp: 1 each, Rfl: 0    hydroCHLOROthiazide (HYDRODIURIL) 25 MG tablet, TAKE 1 TABLET BY MOUTH ONCE DAILY, Disp: 90 tablet, Rfl: 1    isosorbide mononitrate (IMDUR) 30 MG 24 hr tablet, Take 1 tablet BY MOUTH EVERY DAY, Disp: 90 tablet, Rfl: 3    meclizine (ANTIVERT) 12.5 MG tablet, TAKE 2 TABLETS BY ORAL ROUTE 1 HOUR BEFORE EXPOSURE TO MOTION, Disp: 30 tablet, Rfl: 0    metoprolol succinate XL (TOPROL-XL) 25 MG 24 hr tablet, Take 1 tablet by mouth Daily., Disp: 90 tablet, Rfl: 3    nitroglycerin (NITROSTAT) 0.4 MG SL tablet, Place 1 tablet under the tongue Every 5 (Five) Minutes As Needed for Chest Pain. Take no more than 3 doses in 15 minutes., Disp: 25 tablet, Rfl: 2    ondansetron (ZOFRAN) 4 MG tablet, Take 1 tablet by mouth., Disp: , Rfl:     Allergies   Allergen Reactions    Diphenhydramine Other (See Comments)     NUMB IN FACE    Sudafed [Pseudoephedrine Hcl] Swelling        History reviewed. No pertinent family history.    Social History     Socioeconomic History    Marital status:    Tobacco Use    Smoking status: Every Day     Packs/day: 0.50     Years: 39.00     Additional pack years: 0.00     Total pack years: 19.50     Types: Cigarettes     Start date: 1984     Passive exposure: Current    Smokeless tobacco: Never   Vaping Use    Vaping Use: Never used   Substance and Sexual Activity    Alcohol use: Never    Drug use: Never    Sexual activity: Defer       Vital Signs:   /50 (BP Location: Left arm, Patient Position: Sitting, Cuff Size: Adult)   Pulse 66   Ht 154.9 cm (61\")   Wt 72.5 kg (159 lb 12.8 oz)   BMI 30.19 kg/m²      Physical Exam     Result Review :   The following data was reviewed by: DOLORES Summers on 05/08/2023:  Results for orders placed or performed in visit on 11/08/23   POC Urinalysis " Dipstick, Automated    Specimen: Urine   Result Value Ref Range    Color Yellow Yellow, Straw, Dark Yellow, Jane    Clarity, UA Clear Clear    Specific Gravity  1.030 1.005 - 1.030    pH, Urine 5.5 5.0 - 8.0    Leukocytes Negative Negative    Nitrite, UA Negative Negative    Protein, POC Negative Negative mg/dL    Glucose, UA Negative Negative mg/dL    Ketones, UA Negative Negative    Urobilinogen, UA 0.2 E.U./dL Normal, 0.2 E.U./dL    Bilirubin Negative Negative    Blood, UA Trace (A) Negative    Lot Number [id number]     Expiration Date 2,024/9             Procedures        Assessment and Plan    Diagnoses and all orders for this visit:    1. Hematuria, unspecified type (Primary)  -     POC Urinalysis Dipstick, Automated        Given that the patient continues to have 0-2 red blood cells per high-powered field we did discuss we could proceed with upper and lower urinary tract evaluation however we did once again discussed AUA guidelines consider 3-5 red blood cells per high-powered field or greater on any urinalysis to be of concern for microscopic hematuria diagnosis and she does not meet the qualification at this time.    She does not wish to proceed with upper and lower urinary tract evaluation.    She will call the office if she is with any new or worsening symptoms or if she is ever with any gross hematuria for further evaluation.      I spent 10 minutes caring for Zaria on this date of service. This time includes time spent by me in the following activities:reviewing tests, obtaining and/or reviewing a separately obtained history, performing a medically appropriate examination and/or evaluation , counseling and educating the patient/family/caregiver, ordering medications, tests, or procedures, and documenting information in the medical record  Follow Up   Return if symptoms worsen or fail to improve.  Patient was given instructions and counseling regarding her condition or for health maintenance advice. Please  see specific information pulled into the AVS if appropriate.         This document has been electronically signed by DOLORES Summers  November 8, 2023 08:53 EST

## 2023-11-21 ENCOUNTER — OFFICE VISIT (OUTPATIENT)
Dept: FAMILY MEDICINE CLINIC | Age: 73
End: 2023-11-21
Payer: MEDICARE

## 2023-11-21 VITALS
HEART RATE: 68 BPM | TEMPERATURE: 97.7 F | OXYGEN SATURATION: 93 % | HEIGHT: 61 IN | DIASTOLIC BLOOD PRESSURE: 83 MMHG | WEIGHT: 158.6 LBS | SYSTOLIC BLOOD PRESSURE: 128 MMHG | BODY MASS INDEX: 29.94 KG/M2

## 2023-11-21 DIAGNOSIS — E11.65 TYPE 2 DIABETES MELLITUS WITH HYPERGLYCEMIA, WITHOUT LONG-TERM CURRENT USE OF INSULIN: Primary | ICD-10-CM

## 2023-11-21 DIAGNOSIS — Z53.20 RECOMMENDATION REFUSED BY PATIENT: ICD-10-CM

## 2023-11-21 DIAGNOSIS — I10 ESSENTIAL HYPERTENSION: ICD-10-CM

## 2023-11-21 DIAGNOSIS — Z72.0 TOBACCO ABUSE: ICD-10-CM

## 2023-11-21 NOTE — PROGRESS NOTES
Chief Complaint  Zaria Howard presents to [facility] for Diabetes (Transfer care from [facility]), Hypertension, and Earache      Subjective     History of Present Illness  Zaria presents today for follow up on Hypertension.    Current medication / treatment includes Metoprolol and isosorbide.    Reported as home BP checks: not checked  Cardiac symptoms none.  The 10-year CVD risk score (OSCAR'Peyton et al., 2008) is: 32.7%    Values used to calculate the score:      Age: 73 years      Sex: Female      Diabetic: Yes      Tobacco smoker: Yes      Systolic Blood Pressure: 128 mmHg      Is BP treated: Yes      HDL Cholesterol: 38 mg/dL      Total Cholesterol: 136 mg/dL  Diabetes type:  Type 2    A1C Last 3 Results          3/17/2023    07:34 2023    09:13 2023    09:14   HGBA1C Last 3 Results   Hemoglobin A1C 8.10  7.20  7.30      Microalbumin          3/17/2023    07:34   Microalbumin   Microalbumin, Urine <1.2        Current diabetic medications include Trulicity(dulaglutide)  SHE IS NOT INTERESTED IN ANY OTHER MEDICATIONS FOR HER DIABETES.  SHE IS AWARE THAT HER A1C IS NOT AT GOAL BUT DOES NOT WANT ADDITIONAL MEDICATION.  SHE IS ALSO NOT INTERESTED IN STARTING ON AN ACE OR ARB AS WE DISCUSSED TODAY    Diabetic Review of Systems:  Medication compliance: compliant all of the time  Diabetic diet compliance: noncompliant much of the time  Blood sugar lo-130's and 140-190's  Any hypoglycemic episodes? - No    Is She on ACE inhibitor or angiotensin II receptor blocker and a statin? Not on ACE or ARB    atorvastatin (lipitor)        Your patient is overdue for a Diabetic Eye Exam     Ear Pain: Patient presents with right ear pain.  Symptoms include right ear drainage , congestion, and sore throat. Symptoms began 3 days ago and are gradually worsening since that time. Patient denies nonproductive cough. Ear history: several previous ear infections.     She is currently  under the care of Cardiology - Dr. Calderon and follows up with him one time per year.     She is not interested in screening exams.  She is aware of the risks associated with delay in diagnosis, but wishes to decline testing.  We discussed Mammogram, Dexa scan, lung cancer screening, colonoscopy, influenza vaccine, covid booster, pneumonia vaccine and she does not wish to schedule any preventative exams or vaccinations.           Assessment and Plan       Diagnoses and all orders for this visit:    1. Type 2 diabetes mellitus with hyperglycemia, without long-term current use of insulin (Primary)  Comments:  test strips provided today.  Advised that she should test her BS one time daily and bring log into next appt  Orders:  -     glucose blood test strip; Check BS BID Dx E11.9   Accucheck  Dispense: 300 each; Refill: 3    2. Essential hypertension  Comments:  continue current treatment  follow up in 6 months    3. Recommendation refused by patient  Comments:  discussed screening recommendations and vaccination recommendations - declines all    4. Tobacco abuse  Comments:  continue to work toward cessation        I spent 45 minutes caring for Zaria on this date of service. This time includes time spent by me in the following activities:preparing for the visit, reviewing tests, performing a medically appropriate examination and/or evaluation , counseling and educating the patient/family/caregiver, and documenting information in the medical record    Follow Up   No follow-ups on file.      New Medications Ordered This Visit   Medications    glucose blood test strip     Sig: Check BS BID Dx E11.9   Accucheck     Dispense:  300 each     Refill:  3       Medications Discontinued During This Encounter   Medication Reason    glucose blood test strip Reorder          Review of Systems    Objective     Vitals:    11/21/23 1004 11/21/23 1051   BP: 141/71 128/83   BP Location: Left arm Left arm   Patient Position: Sitting  "Sitting   Cuff Size: Adult    Pulse: 74 68   Temp: 97.7 °F (36.5 °C)    TempSrc: Oral    SpO2: 93%    Weight: 71.9 kg (158 lb 9.6 oz)    Height: 154.9 cm (60.98\")      Body mass index is 29.98 kg/m².          Physical Exam  Constitutional:       General: She is not in acute distress.     Appearance: Normal appearance.   HENT:      Head: Normocephalic.   Cardiovascular:      Rate and Rhythm: Normal rate and regular rhythm.   Pulmonary:      Effort: Pulmonary effort is normal.      Breath sounds: Examination of the right-upper field reveals wheezing. Examination of the left-upper field reveals wheezing. Wheezing present.   Musculoskeletal:         General: Normal range of motion.   Neurological:      General: No focal deficit present.      Mental Status: She is alert and oriented to person, place, and time.   Psychiatric:         Mood and Affect: Mood normal.         Behavior: Behavior normal.            Result Review   The following data was reviewed by: DOLORES Perdomo on 11/21/2023:  CT Chest Without Contrast Diagnostic (11/03/2020 09:28)   Hemoglobin A1c (09/21/2023 09:14)  Lipid Panel (09/21/2023 09:14)  Comprehensive Metabolic Panel (09/21/2023 09:14)                    Allergies   Allergen Reactions    Diphenhydramine Other (See Comments)     NUMB IN FACE    Sudafed [Pseudoephedrine Hcl] Swelling      Past Medical History:   Diagnosis Date    Diabetes mellitus     Hyperlipidemia     Hypertension      Current Outpatient Medications   Medication Sig Dispense Refill    Accu-Chek FastClix Lancets misc USE TO TEST BLOOD SUGAR TWICE DAILY 102 each 5    albuterol sulfate  (90 Base) MCG/ACT inhaler Inhale 1 puff.      aspirin 81 MG EC tablet Take 1 tablet by mouth Daily.      atorvastatin (LIPITOR) 10 MG tablet TAKE 1 TABLET BY MOUTH ONCE DAILY 90 tablet 1    Dulaglutide (Trulicity) 3 MG/0.5ML solution pen-injector Inject 0.5 mL under the skin into the appropriate area as directed Every 7 (Seven) Days. 6 mL " 1    glucose blood test strip Check BS BID Dx E11.9   Accucheck 300 each 3    glucose monitor monitoring kit 1 each 2 (Two) Times a Day. Check BS BID DX E11.9 1 each 0    hydroCHLOROthiazide (HYDRODIURIL) 25 MG tablet TAKE 1 TABLET BY MOUTH ONCE DAILY 90 tablet 1    isosorbide mononitrate (IMDUR) 30 MG 24 hr tablet Take 1 tablet BY MOUTH EVERY DAY 90 tablet 3    meclizine (ANTIVERT) 12.5 MG tablet TAKE 2 TABLETS BY ORAL ROUTE 1 HOUR BEFORE EXPOSURE TO MOTION 30 tablet 0    metoprolol succinate XL (TOPROL-XL) 25 MG 24 hr tablet Take 1 tablet by mouth Daily. 90 tablet 3    nitroglycerin (NITROSTAT) 0.4 MG SL tablet Place 1 tablet under the tongue Every 5 (Five) Minutes As Needed for Chest Pain. Take no more than 3 doses in 15 minutes. 25 tablet 2    ondansetron (ZOFRAN) 4 MG tablet Take 1 tablet by mouth.       No current facility-administered medications for this visit.     Past Surgical History:   Procedure Laterality Date    BLADDER SUSPENSION  2018    HYSTERECTOMY        There are no preventive care reminders to display for this patient.     Immunization History   Administered Date(s) Administered    COVID-19 (MODERNA) 1st,2nd,3rd Dose Monovalent 03/04/2021, 04/01/2021, 10/25/2021    COVID-19 (UNSPECIFIED) 07/07/2022    TD Preservative Free (Tenivac) 07/20/2005         Part of this note may be an electronic transcription/translation of spoken language to printed   text using the Dragon Dictation System.      DOLORES Perdomo

## 2024-03-26 ENCOUNTER — LAB (OUTPATIENT)
Dept: LAB | Facility: HOSPITAL | Age: 74
End: 2024-03-26
Payer: MEDICARE

## 2024-03-26 ENCOUNTER — OFFICE VISIT (OUTPATIENT)
Dept: FAMILY MEDICINE CLINIC | Age: 74
End: 2024-03-26
Payer: MEDICARE

## 2024-03-26 VITALS
OXYGEN SATURATION: 97 % | SYSTOLIC BLOOD PRESSURE: 138 MMHG | DIASTOLIC BLOOD PRESSURE: 55 MMHG | WEIGHT: 158.4 LBS | BODY MASS INDEX: 29.91 KG/M2 | HEART RATE: 68 BPM | HEIGHT: 61 IN

## 2024-03-26 DIAGNOSIS — E78.2 MIXED HYPERLIPIDEMIA: ICD-10-CM

## 2024-03-26 DIAGNOSIS — Z00.00 MEDICARE ANNUAL WELLNESS VISIT, SUBSEQUENT: Primary | ICD-10-CM

## 2024-03-26 DIAGNOSIS — I10 ESSENTIAL HYPERTENSION: ICD-10-CM

## 2024-03-26 DIAGNOSIS — Z91.89 AT HIGH RISK FOR CARDIOVASCULAR DISEASE: ICD-10-CM

## 2024-03-26 DIAGNOSIS — E11.65 TYPE 2 DIABETES MELLITUS WITH HYPERGLYCEMIA, WITHOUT LONG-TERM CURRENT USE OF INSULIN: ICD-10-CM

## 2024-03-26 LAB
ALBUMIN SERPL-MCNC: 4.2 G/DL (ref 3.5–5.2)
ALBUMIN UR-MCNC: <1.2 MG/DL
ALBUMIN/GLOB SERPL: 1.8 G/DL
ALP SERPL-CCNC: 84 U/L (ref 39–117)
ALT SERPL W P-5'-P-CCNC: 13 U/L (ref 1–33)
ANION GAP SERPL CALCULATED.3IONS-SCNC: 12 MMOL/L (ref 5–15)
AST SERPL-CCNC: 10 U/L (ref 1–32)
BILIRUB SERPL-MCNC: 0.4 MG/DL (ref 0–1.2)
BUN SERPL-MCNC: 18 MG/DL (ref 8–23)
BUN/CREAT SERPL: 20.2 (ref 7–25)
CALCIUM SPEC-SCNC: 9.7 MG/DL (ref 8.6–10.5)
CHLORIDE SERPL-SCNC: 104 MMOL/L (ref 98–107)
CHOLEST SERPL-MCNC: 130 MG/DL (ref 0–200)
CO2 SERPL-SCNC: 26 MMOL/L (ref 22–29)
CREAT SERPL-MCNC: 0.89 MG/DL (ref 0.57–1)
CREAT UR-MCNC: 171.7 MG/DL
EGFRCR SERPLBLD CKD-EPI 2021: 69 ML/MIN/1.73
GLOBULIN UR ELPH-MCNC: 2.4 GM/DL
GLUCOSE SERPL-MCNC: 149 MG/DL (ref 65–99)
HBA1C MFR BLD: 7.5 % (ref 4.8–5.6)
HDLC SERPL-MCNC: 40 MG/DL (ref 40–60)
LDLC SERPL CALC-MCNC: 67 MG/DL (ref 0–100)
LDLC/HDLC SERPL: 1.61 {RATIO}
MICROALBUMIN/CREAT UR: NORMAL MG/G{CREAT}
POTASSIUM SERPL-SCNC: 4.5 MMOL/L (ref 3.5–5.2)
PROT SERPL-MCNC: 6.6 G/DL (ref 6–8.5)
SODIUM SERPL-SCNC: 142 MMOL/L (ref 136–145)
TRIGL SERPL-MCNC: 128 MG/DL (ref 0–150)
VLDLC SERPL-MCNC: 23 MG/DL (ref 5–40)

## 2024-03-26 PROCEDURE — 80053 COMPREHEN METABOLIC PANEL: CPT

## 2024-03-26 PROCEDURE — 80061 LIPID PANEL: CPT

## 2024-03-26 PROCEDURE — 82043 UR ALBUMIN QUANTITATIVE: CPT

## 2024-03-26 PROCEDURE — 82570 ASSAY OF URINE CREATININE: CPT

## 2024-03-26 PROCEDURE — 83036 HEMOGLOBIN GLYCOSYLATED A1C: CPT

## 2024-03-26 PROCEDURE — 36415 COLL VENOUS BLD VENIPUNCTURE: CPT

## 2024-03-26 RX ORDER — ATORVASTATIN CALCIUM 10 MG/1
10 TABLET, FILM COATED ORAL DAILY
Qty: 90 TABLET | Refills: 1 | Status: SHIPPED | OUTPATIENT
Start: 2024-03-26

## 2024-03-26 RX ORDER — HYDROCHLOROTHIAZIDE 25 MG/1
25 TABLET ORAL DAILY
Qty: 90 TABLET | Refills: 1 | Status: SHIPPED | OUTPATIENT
Start: 2024-03-26

## 2024-03-26 RX ORDER — DULAGLUTIDE 3 MG/.5ML
3 INJECTION, SOLUTION SUBCUTANEOUS
Qty: 6 ML | Refills: 1 | Status: SHIPPED | OUTPATIENT
Start: 2024-03-26

## 2024-03-26 NOTE — PROGRESS NOTES
The ABCs of the Annual Wellness Visit  Subsequent Medicare Wellness Visit    Subjective    Zaria Howard is a 73 y.o. female who presents for a Subsequent Medicare Wellness Visit.    The following portions of the patient's history were reviewed and   updated as appropriate: allergies, current medications, past family history, past medical history, past social history, past surgical history, and problem list.    Compared to one year ago, the patient feels her physical   health is the same.    Compared to one year ago, the patient feels her mental   health is the same.    Recent Hospitalizations:  She was not admitted to the hospital during the last year.       Current Medical Providers:  Patient Care Team:  Karoline Harman APRN as PCP - General (Nurse Practitioner)  Jose Elias Calderon MD as Consulting Physician (Cardiology)    Outpatient Medications Prior to Visit   Medication Sig Dispense Refill    Accu-Chek FastClix Lancets misc USE TO TEST BLOOD SUGAR TWICE DAILY 102 each 5    albuterol sulfate  (90 Base) MCG/ACT inhaler Inhale 1 puff.      aspirin 81 MG EC tablet Take 1 tablet by mouth Daily.      glucose blood test strip Check BS BID Dx E11.9   Accucheck 300 each 3    glucose monitor monitoring kit 1 each 2 (Two) Times a Day. Check BS BID DX E11.9 1 each 0    isosorbide mononitrate (IMDUR) 30 MG 24 hr tablet Take 1 tablet BY MOUTH EVERY DAY 90 tablet 3    meclizine (ANTIVERT) 12.5 MG tablet TAKE 2 TABLETS BY ORAL ROUTE 1 HOUR BEFORE EXPOSURE TO MOTION 30 tablet 0    metoprolol succinate XL (TOPROL-XL) 25 MG 24 hr tablet Take 1 tablet by mouth Daily. 90 tablet 3    nitroglycerin (NITROSTAT) 0.4 MG SL tablet Place 1 tablet under the tongue Every 5 (Five) Minutes As Needed for Chest Pain. Take no more than 3 doses in 15 minutes. 25 tablet 2    ondansetron (ZOFRAN) 4 MG tablet Take 1 tablet by mouth.      atorvastatin (LIPITOR) 10 MG tablet TAKE 1 TABLET BY MOUTH ONCE DAILY 90 tablet 1    Dulaglutide  "(Trulicity) 3 MG/0.5ML solution pen-injector Inject 0.5 mL under the skin into the appropriate area as directed Every 7 (Seven) Days. 6 mL 1    hydroCHLOROthiazide (HYDRODIURIL) 25 MG tablet TAKE 1 TABLET BY MOUTH ONCE DAILY 90 tablet 1     No facility-administered medications prior to visit.       No opioid medication identified on active medication list. I have reviewed chart for other potential  high risk medication/s and harmful drug interactions in the elderly.        Aspirin is on active medication list. Aspirin use is indicated based on review of current medical condition/s. Pros and cons of this therapy have been discussed today. Benefits of this medication outweigh potential harm.  Patient has been encouraged to continue taking this medication.  .      Patient Active Problem List   Diagnosis    Type 2 diabetes mellitus with hyperglycemia, without long-term current use of insulin    Vertigo    Mixed hyperlipidemia    Essential hypertension    Tobacco abuse    Dyspnea    Cervical polyp    Combined forms of age-related cataract of both eyes    Female stress incontinence    Urge incontinence    Uterovaginal prolapse, incomplete    At high risk for cardiovascular disease     Advance Care Planning   Advance Care Planning     Advance Directive is not on file.  ACP discussion was held with the patient during this visit. Patient does not have an advance directive, declines further assistance.     Objective    Vitals:    03/26/24 0810   BP: 138/55   BP Location: Right arm   Patient Position: Sitting   Cuff Size: Adult   Pulse: 68   SpO2: 97%  Comment: room air   Weight: 71.8 kg (158 lb 6.4 oz)   Height: 154.9 cm (60.98\")     Estimated body mass index is 29.95 kg/m² as calculated from the following:    Height as of this encounter: 154.9 cm (60.98\").    Weight as of this encounter: 71.8 kg (158 lb 6.4 oz).    BMI is >= 25 and <30. (Overweight) The following options were offered after discussion;: exercise " counseling/recommendations and nutrition counseling/recommendations      Does the patient have evidence of cognitive impairment? No          HEALTH RISK ASSESSMENT    Smoking Status:  Social History     Tobacco Use   Smoking Status Every Day    Current packs/day: 0.50    Average packs/day: 0.5 packs/day for 40.2 years (20.1 ttl pk-yrs)    Types: Cigarettes    Start date: 1984    Passive exposure: Current   Smokeless Tobacco Never     Alcohol Consumption:  Social History     Substance and Sexual Activity   Alcohol Use Never     Fall Risk Screen:    STEADI Fall Risk Assessment was completed, and patient is at LOW risk for falls.Assessment completed on:3/26/2024    Depression Screening:      3/26/2024     8:15 AM   PHQ-2/PHQ-9 Depression Screening   Little Interest or Pleasure in Doing Things 0-->not at all   Feeling Down, Depressed or Hopeless 0-->not at all   PHQ-9: Brief Depression Severity Measure Score 0       Health Habits and Functional and Cognitive Screening:      3/26/2024     8:16 AM   Functional & Cognitive Status   Do you have difficulty preparing food and eating? No   Do you have difficulty bathing yourself, getting dressed or grooming yourself? No   Do you have difficulty using the toilet? No   Do you have difficulty moving around from place to place? No   Do you have trouble with steps or getting out of a bed or a chair? No   Current Diet Well Balanced Diet   Dental Exam Not up to date   Eye Exam Up to date   Exercise (times per week) 7 times per week   Current Exercises Include Walking   Do you need help using the phone?  No   Are you deaf or do you have serious difficulty hearing?  No   Do you need help to go to places out of walking distance? No   Do you need help shopping? No   Do you need help preparing meals?  No   Do you need help with housework?  No   Do you need help with laundry? No   Do you need help taking your medications? No   Do you need help managing money? No   Do you ever drive or ride  in a car without wearing a seat belt? No   Have you felt unusual stress, anger or loneliness in the last month? No   Who do you live with? Child   If you need help, do you have trouble finding someone available to you? No   Have you been bothered in the last four weeks by sexual problems? No   Do you have difficulty concentrating, remembering or making decisions? No       Age-appropriate Screening Schedule:  Refer to the list below for future screening recommendations based on patient's age, sex and/or medical conditions. Orders for these recommended tests are listed in the plan section. The patient has been provided with a written plan.    Health Maintenance   Topic Date Due    MAMMOGRAM  Never done    DXA SCAN  Never done    COLORECTAL CANCER SCREENING  Never done    Pneumococcal Vaccine 65+ (1 of 2 - PCV) Never done    RSV Vaccine - Adults (1 - 1-dose 60+ series) Never done    TDAP/TD VACCINES (2 - Tdap) 07/20/2015    LUNG CANCER SCREENING  11/03/2021    COVID-19 Vaccine (5 - 2023-24 season) 09/01/2023    URINE MICROALBUMIN  03/17/2024    HEMOGLOBIN A1C  03/21/2024    DIABETIC EYE EXAM  04/24/2024    INFLUENZA VACCINE  03/31/2024 (Originally 8/1/2023)    ZOSTER VACCINE (1 of 2) 11/21/2024 (Originally 5/3/2001)    DIABETIC FOOT EXAM  06/30/2024    LIPID PANEL  09/21/2024    ANNUAL WELLNESS VISIT  03/26/2025    BMI FOLLOWUP  03/26/2025    HEPATITIS C SCREENING  Completed                DECLINES ALL SCREENING AND PREVENTATIVE SERVICES.  SHE IS AWARE THAT EARLY DETECTION IMPROVES SURVIVAL RATES ON SOME CONDITIONS.  DECLINES ALL VACCINES   CMS Preventative Services Quick Reference  Risk Factors Identified During Encounter  Immunizations Discussed/Encouraged: Tdap, Influenza, Shingrix, COVID19, and RSV (Respiratory Syncytial Virus)  The above risks/problems have been discussed with the patient.  Pertinent information has been shared with the patient in the After Visit Summary.  An After Visit Summary and PPPS were made  available to the patient.    Follow Up:   Next Medicare Wellness visit to be scheduled in 1 year.       Additional E&M Note during same encounter follows:  Patient has multiple medical problems which are significant and separately identifiable that require additional work above and beyond the Medicare Wellness Visit.      Chief Complaint  Medicare Wellness-subsequent    Subjective        HPI  Zaria Howard is also being seen today for DM, HLD, HTN    Zaria presents today for follow up on Hypertension.    Current medication / treatment includes hydrochlorothiazide and metoprolol .  She is also taking Imdur for medical management of chest pain - managed by cardiology Dr. Calderon  Home blood pressure monitoring readings reported as Karoline home BP checks: It is well controlled when checked.  Cardiac symptoms chest pain controlled with medication and PRN nitro  Medication changes are not recommended at this time.  Zaria Howard reports compliant with current medications.      Zaria presents today for follow up on hyperlipidemia.  Previous values:   Lab Results   Component Value Date    CHOL 136 09/21/2023    CHLPL 128 03/12/2021    TRIG 180 (H) 09/21/2023    HDL 38 (L) 09/21/2023    LDL 68 09/21/2023    ;    Zaria reports compliant with medication which is atorvastatin (lipitor)    No side effects reported from this medication .   No new concerns to discuss today.  The 10-year ASCVD risk score (Jana PELAEZ, et al., 2019) is: 43.5%    Values used to calculate the score:      Age: 73 years      Sex: Female      Is Non- : No      Diabetic: Yes      Tobacco smoker: Yes      Systolic Blood Pressure: 138 mmHg      Is BP treated: Yes      HDL Cholesterol: 38 mg/dL      Total Cholesterol: 136 mg/dL    Diabetes type:  Type 2    A1C Last 3 Results          6/30/2023    09:13 9/21/2023    09:14   HGBA1C Last 3 Results   Hemoglobin A1C 7.20  7.30          Current diabetic medications include  "Trulicity(dulaglutide)    Diabetic Review of Systems:  Medication compliance: compliant all of the time  Diabetic diet compliance: noncompliant much of the time  Blood sugar log: not checked  Any hypoglycemic episodes? - No    Is She on ACE inhibitor or angiotensin II receptor blocker and a statin? Not on ACE or ARB    atorvastatin (lipitor)                      Objective   Vital Signs:  /55 (BP Location: Right arm, Patient Position: Sitting, Cuff Size: Adult)   Pulse 68   Ht 154.9 cm (60.98\")   Wt 71.8 kg (158 lb 6.4 oz)   SpO2 97% Comment: room air  BMI 29.95 kg/m²     Physical Exam  Constitutional:       General: She is not in acute distress.     Appearance: Normal appearance.   HENT:      Head: Normocephalic.   Cardiovascular:      Rate and Rhythm: Normal rate and regular rhythm.   Pulmonary:      Effort: Pulmonary effort is normal.      Breath sounds: Normal breath sounds.   Musculoskeletal:         General: Normal range of motion.   Neurological:      General: No focal deficit present.      Mental Status: She is alert and oriented to person, place, and time.   Psychiatric:         Mood and Affect: Mood normal.         Behavior: Behavior normal.                         Assessment and Plan   Diagnoses and all orders for this visit:    1. Medicare annual wellness visit, subsequent (Primary)    2. Type 2 diabetes mellitus with hyperglycemia, without long-term current use of insulin  Comments:  labs  continue current treatment  f/u 6 months  Orders:  -     Hemoglobin A1c; Future  -     Comprehensive metabolic panel; Future  -     Microalbumin / Creatinine Urine Ratio - Urine, Clean Catch; Future  -     Dulaglutide (Trulicity) 3 MG/0.5ML solution pen-injector; Inject 0.5 mL under the skin into the appropriate area as directed Every 7 (Seven) Days.  Dispense: 6 mL; Refill: 1    3. Essential hypertension  Comments:  BP acceptable  slightly elevated today but overall stable  continue current " treatment  Orders:  -     Comprehensive metabolic panel; Future  -     hydroCHLOROthiazide 25 MG tablet; Take 1 tablet by mouth Daily.  Dispense: 90 tablet; Refill: 1    4. Mixed hyperlipidemia  -     Lipid panel; Future  -     atorvastatin (LIPITOR) 10 MG tablet; Take 1 tablet by mouth Daily.  Dispense: 90 tablet; Refill: 1    5. At high risk for cardiovascular disease  Comments:  advised on smoking cessation , continue current treatment with statin.             Follow Up   Return in about 6 months (around 9/26/2024) for Recheck, Pending lab results.  Patient was given instructions and counseling regarding her condition or for health maintenance advice. Please see specific information pulled into the AVS if appropriate.

## 2024-04-30 RX ORDER — MECLIZINE HCL 12.5 MG/1
TABLET ORAL
Qty: 30 TABLET | Refills: 0 | Status: SHIPPED | OUTPATIENT
Start: 2024-04-30

## 2024-06-26 ENCOUNTER — LAB (OUTPATIENT)
Dept: LAB | Facility: HOSPITAL | Age: 74
End: 2024-06-26
Payer: MEDICARE

## 2024-06-26 ENCOUNTER — OFFICE VISIT (OUTPATIENT)
Dept: FAMILY MEDICINE CLINIC | Age: 74
End: 2024-06-26
Payer: MEDICARE

## 2024-06-26 ENCOUNTER — TELEPHONE (OUTPATIENT)
Dept: FAMILY MEDICINE CLINIC | Age: 74
End: 2024-06-26

## 2024-06-26 VITALS
DIASTOLIC BLOOD PRESSURE: 74 MMHG | OXYGEN SATURATION: 96 % | HEIGHT: 61 IN | BODY MASS INDEX: 30.02 KG/M2 | SYSTOLIC BLOOD PRESSURE: 137 MMHG | HEART RATE: 70 BPM | WEIGHT: 159 LBS

## 2024-06-26 DIAGNOSIS — Z53.20 RECOMMENDATION REFUSED BY PATIENT: ICD-10-CM

## 2024-06-26 DIAGNOSIS — E78.2 MIXED HYPERLIPIDEMIA: ICD-10-CM

## 2024-06-26 DIAGNOSIS — Z78.0 POST-MENOPAUSE: ICD-10-CM

## 2024-06-26 DIAGNOSIS — I10 ESSENTIAL HYPERTENSION: ICD-10-CM

## 2024-06-26 DIAGNOSIS — Z28.21 VACCINATION NOT CARRIED OUT BECAUSE OF PATIENT REFUSAL: ICD-10-CM

## 2024-06-26 DIAGNOSIS — E11.65 TYPE 2 DIABETES MELLITUS WITH HYPERGLYCEMIA, WITHOUT LONG-TERM CURRENT USE OF INSULIN: ICD-10-CM

## 2024-06-26 DIAGNOSIS — E11.65 TYPE 2 DIABETES MELLITUS WITH HYPERGLYCEMIA, WITHOUT LONG-TERM CURRENT USE OF INSULIN: Primary | ICD-10-CM

## 2024-06-26 DIAGNOSIS — Z87.891 PERSONAL HISTORY OF NICOTINE DEPENDENCE: ICD-10-CM

## 2024-06-26 LAB
ALBUMIN SERPL-MCNC: 4.3 G/DL (ref 3.5–5.2)
ALBUMIN/GLOB SERPL: 1.7 G/DL
ALP SERPL-CCNC: 83 U/L (ref 39–117)
ALT SERPL W P-5'-P-CCNC: 9 U/L (ref 1–33)
ANION GAP SERPL CALCULATED.3IONS-SCNC: 11.7 MMOL/L (ref 5–15)
AST SERPL-CCNC: 11 U/L (ref 1–32)
BILIRUB SERPL-MCNC: 0.4 MG/DL (ref 0–1.2)
BUN SERPL-MCNC: 18 MG/DL (ref 8–23)
BUN/CREAT SERPL: 24.7 (ref 7–25)
CALCIUM SPEC-SCNC: 9.7 MG/DL (ref 8.6–10.5)
CHLORIDE SERPL-SCNC: 102 MMOL/L (ref 98–107)
CHOLEST SERPL-MCNC: 122 MG/DL (ref 0–200)
CO2 SERPL-SCNC: 27.3 MMOL/L (ref 22–29)
CREAT SERPL-MCNC: 0.73 MG/DL (ref 0.57–1)
EGFRCR SERPLBLD CKD-EPI 2021: 87 ML/MIN/1.73
GLOBULIN UR ELPH-MCNC: 2.6 GM/DL
GLUCOSE SERPL-MCNC: 150 MG/DL (ref 65–99)
HBA1C MFR BLD: 7.5 % (ref 4.8–5.6)
HDLC SERPL-MCNC: 42 MG/DL (ref 40–60)
LDLC SERPL CALC-MCNC: 58 MG/DL (ref 0–100)
LDLC/HDLC SERPL: 1.32 {RATIO}
POTASSIUM SERPL-SCNC: 3.9 MMOL/L (ref 3.5–5.2)
PROT SERPL-MCNC: 6.9 G/DL (ref 6–8.5)
SODIUM SERPL-SCNC: 141 MMOL/L (ref 136–145)
TRIGL SERPL-MCNC: 123 MG/DL (ref 0–150)
VLDLC SERPL-MCNC: 22 MG/DL (ref 5–40)

## 2024-06-26 PROCEDURE — 3078F DIAST BP <80 MM HG: CPT | Performed by: NURSE PRACTITIONER

## 2024-06-26 PROCEDURE — 36415 COLL VENOUS BLD VENIPUNCTURE: CPT

## 2024-06-26 PROCEDURE — 83036 HEMOGLOBIN GLYCOSYLATED A1C: CPT

## 2024-06-26 PROCEDURE — 99214 OFFICE O/P EST MOD 30 MIN: CPT | Performed by: NURSE PRACTITIONER

## 2024-06-26 PROCEDURE — 80053 COMPREHEN METABOLIC PANEL: CPT

## 2024-06-26 PROCEDURE — 1126F AMNT PAIN NOTED NONE PRSNT: CPT | Performed by: NURSE PRACTITIONER

## 2024-06-26 PROCEDURE — 3075F SYST BP GE 130 - 139MM HG: CPT | Performed by: NURSE PRACTITIONER

## 2024-06-26 PROCEDURE — G2211 COMPLEX E/M VISIT ADD ON: HCPCS | Performed by: NURSE PRACTITIONER

## 2024-06-26 PROCEDURE — 1160F RVW MEDS BY RX/DR IN RCRD: CPT | Performed by: NURSE PRACTITIONER

## 2024-06-26 PROCEDURE — 80061 LIPID PANEL: CPT

## 2024-06-26 PROCEDURE — 3051F HG A1C>EQUAL 7.0%<8.0%: CPT | Performed by: NURSE PRACTITIONER

## 2024-06-26 PROCEDURE — 1159F MED LIST DOCD IN RCRD: CPT | Performed by: NURSE PRACTITIONER

## 2024-06-26 RX ORDER — DULAGLUTIDE 3 MG/.5ML
3 INJECTION, SOLUTION SUBCUTANEOUS
Qty: 6 ML | Refills: 1 | Status: SHIPPED | OUTPATIENT
Start: 2024-06-26

## 2024-06-26 RX ORDER — ATORVASTATIN CALCIUM 10 MG/1
10 TABLET, FILM COATED ORAL DAILY
Qty: 90 TABLET | Refills: 1 | Status: SHIPPED | OUTPATIENT
Start: 2024-06-26

## 2024-06-26 RX ORDER — METOPROLOL SUCCINATE 25 MG/1
25 TABLET, EXTENDED RELEASE ORAL DAILY
Qty: 90 TABLET | Refills: 3 | Status: SHIPPED | OUTPATIENT
Start: 2024-06-26

## 2024-06-26 RX ORDER — HYDROCHLOROTHIAZIDE 25 MG/1
25 TABLET ORAL DAILY
Qty: 90 TABLET | Refills: 1 | Status: SHIPPED | OUTPATIENT
Start: 2024-06-26

## 2024-06-26 NOTE — PROGRESS NOTES
Chief Complaint  Zaria Howard presents to [facility] for Diabetes (3 month follow up ), Hyperlipidemia, and Hypertension      Subjective     History of Present Illness  Diabetes type:  Type 2    A1C Last 3 Results          2023    09:14 3/26/2024    09:26   HGBA1C Last 3 Results   Hemoglobin A1C 7.30  7.50      Microalbumin          3/26/2024    09:26   Microalbumin   Microalbumin, Urine <1.2        Current diabetic medications include Trulicity(dulaglutide) 3 mg    Diabetic Review of Systems:  Medication compliance: compliant all of the time  Diabetic diet compliance: noncompliant much of the time  Blood sugar lo-130's  Any hypoglycemic episodes? - No    Is She on ACE inhibitor or angiotensin II receptor blocker and a statin? Not on ACE or ARB    atorvastatin (lipitor) 10 mg      Zaria presents today for follow up on Hypertension.    Current medication / treatment includes Metoprolol and hydrochlorothiazide, and is compliant with medications.   no side effects reported.    Home blood pressure monitoring readings reported as home BP checks: It is well controlled when checked.  Medication changes are not recommended at this time.  Zaria presents today for follow up on hyperlipidemia.  Previous values:   Lab Results   Component Value Date    CHOL 130 2024    CHLPL 128 2021    TRIG 128 2024    HDL 40 2024    LDL 67 2024      The 10-year ASCVD risk score (Jana DK, et al., 2019) is: 45.6%  Currently taking medication :yes and reports compliant with medication which is atorvastatin (lipitor) 10 mg   No side effects reported from this medication .  No new concerns to discuss today.           Assessment and Plan       Diagnoses and all orders for this visit:    1. Type 2 diabetes mellitus with hyperglycemia, without long-term current use of insulin (Primary)  Comments:  labs  continue current treatment  f/u 3 months - discussed goal to remain  below 8% for her age  Orders:  -     Hemoglobin A1c; Future  -     Comprehensive metabolic panel; Future  -     Dulaglutide (Trulicity) 3 MG/0.5ML solution pen-injector; Inject 0.5 mL under the skin into the appropriate area as directed Every 7 (Seven) Days.  Dispense: 6 mL; Refill: 1    2. Post-menopause  Comments:  due for dexa  Orders:  -     Cancel: DEXA Bone Density Axial; Future  -     DEXA Bone Density Axial; Future    3. Personal history of nicotine dependence  Comments:  low dose CT scan  Orders:  -      CT Chest Low Dose Cancer Screening WO; Future    4. Essential hypertension  Comments:  continue current treatment   f/u next scheduled appt  Orders:  -     Comprehensive metabolic panel; Future  -     hydroCHLOROthiazide 25 MG tablet; Take 1 tablet by mouth Daily.  Dispense: 90 tablet; Refill: 1  -     metoprolol succinate XL (TOPROL-XL) 25 MG 24 hr tablet; Take 1 tablet by mouth Daily.  Dispense: 90 tablet; Refill: 3    5. Mixed hyperlipidemia  Comments:  continue current treatment  f/u next scheduled appt  Orders:  -     Comprehensive metabolic panel; Future  -     Lipid panel; Future  -     atorvastatin (LIPITOR) 10 MG tablet; Take 1 tablet by mouth Daily.  Dispense: 90 tablet; Refill: 1    6. Vaccination not carried out because of patient refusal  Comments:  declines covid, RSV and pneumonia vaccine    7. Recommendation refused by patient  Comments:  declines mammogram and colon screening            Follow Up   Return in about 3 months (around 9/26/2024) for Recheck.  Future Appointments   Date Time Provider Department Center   6/26/2024  8:25 AM LAB [facility] None   9/11/2024  9:30 AM Jose Elias Calderon MD [facility] CD [facility]   9/26/2024  9:00 AM Karoline Harman APRN [facility] PC [facility]       New Medications Ordered This Visit   Medications    hydroCHLOROthiazide 25 MG tablet     Sig: Take 1 tablet by mouth Daily.     Dispense:  90 tablet     Refill:  1     This prescription was  "filled on 6/26/2023. Any refills authorized will be placed on file.    metoprolol succinate XL (TOPROL-XL) 25 MG 24 hr tablet     Sig: Take 1 tablet by mouth Daily.     Dispense:  90 tablet     Refill:  3    atorvastatin (LIPITOR) 10 MG tablet     Sig: Take 1 tablet by mouth Daily.     Dispense:  90 tablet     Refill:  1     This prescription was filled on 10/2/2023. Any refills authorized will be placed on file.    Dulaglutide (Trulicity) 3 MG/0.5ML solution pen-injector     Sig: Inject 0.5 mL under the skin into the appropriate area as directed Every 7 (Seven) Days.     Dispense:  6 mL     Refill:  1       Medications Discontinued During This Encounter   Medication Reason    metoprolol succinate XL (TOPROL-XL) 25 MG 24 hr tablet Reorder    atorvastatin (LIPITOR) 10 MG tablet Reorder    Dulaglutide (Trulicity) 3 MG/0.5ML solution pen-injector Reorder    hydroCHLOROthiazide 25 MG tablet Reorder          Review of Systems    Objective     Vitals:    06/26/24 0750   BP: 137/74   BP Location: Left arm   Patient Position: Sitting   Cuff Size: Adult   Pulse: 70   SpO2: 96%   Weight: 72.1 kg (159 lb)   Height: 154.9 cm (60.98\")     Body mass index is 30.06 kg/m².          Physical Exam  Constitutional:       General: She is not in acute distress.     Appearance: Normal appearance.   HENT:      Head: Normocephalic.   Cardiovascular:      Rate and Rhythm: Normal rate and regular rhythm.   Pulmonary:      Effort: Pulmonary effort is normal.      Breath sounds: Normal breath sounds.   Musculoskeletal:         General: Normal range of motion.   Neurological:      General: No focal deficit present.      Mental Status: She is alert and oriented to person, place, and time.   Psychiatric:         Mood and Affect: Mood normal.         Behavior: Behavior normal.            Result Review               Allergies   Allergen Reactions    Diphenhydramine Other (See Comments)     NUMB IN FACE    Sudafed [Pseudoephedrine Hcl] Swelling    "   Past Medical History:   Diagnosis Date    Diabetes mellitus     Hyperlipidemia     Hypertension      Current Outpatient Medications   Medication Sig Dispense Refill    Accu-Chek FastClix Lancets misc USE TO TEST BLOOD SUGAR TWICE DAILY 102 each 5    albuterol sulfate  (90 Base) MCG/ACT inhaler Inhale 1 puff.      aspirin 81 MG EC tablet Take 1 tablet by mouth Daily.      atorvastatin (LIPITOR) 10 MG tablet Take 1 tablet by mouth Daily. 90 tablet 1    Dulaglutide (Trulicity) 3 MG/0.5ML solution pen-injector Inject 0.5 mL under the skin into the appropriate area as directed Every 7 (Seven) Days. 6 mL 1    glucose blood test strip Check BS BID Dx E11.9   Accucheck 300 each 3    glucose monitor monitoring kit 1 each 2 (Two) Times a Day. Check BS BID DX E11.9 1 each 0    hydroCHLOROthiazide 25 MG tablet Take 1 tablet by mouth Daily. 90 tablet 1    isosorbide mononitrate (IMDUR) 30 MG 24 hr tablet Take 1 tablet BY MOUTH EVERY DAY 90 tablet 3    meclizine (ANTIVERT) 12.5 MG tablet TAKE 2 TABLETS BY ORAL ROUTE 1 HOUR BEFORE EXPOSURE TO MOTION 30 tablet 0    metoprolol succinate XL (TOPROL-XL) 25 MG 24 hr tablet Take 1 tablet by mouth Daily. 90 tablet 3    nitroglycerin (NITROSTAT) 0.4 MG SL tablet Place 1 tablet under the tongue Every 5 (Five) Minutes As Needed for Chest Pain. Take no more than 3 doses in 15 minutes. 25 tablet 2    ondansetron (ZOFRAN) 4 MG tablet Take 1 tablet by mouth.       No current facility-administered medications for this visit.     Past Surgical History:   Procedure Laterality Date    BLADDER SUSPENSION  2018    HYSTERECTOMY        Health Maintenance Due   Topic Date Due    DXA SCAN  Never done    LUNG CANCER SCREENING  11/03/2021    DIABETIC EYE EXAM  04/24/2024    HEMOGLOBIN A1C  09/26/2024      Immunization History   Administered Date(s) Administered    COVID-19 (MODERNA) 1st,2nd,3rd Dose Monovalent 03/04/2021, 04/01/2021, 10/25/2021    COVID-19 (UNSPECIFIED) 07/07/2022    TD  Preservative Free ([facility]) 07/20/2005         Part of this note may be an electronic transcription/translation of spoken language to printed   text using the Dragon Dictation System.      DOLORES Perdomo

## 2024-06-26 NOTE — TELEPHONE ENCOUNTER
----- Message from Karoline Harman sent at 6/26/2024  8:13 AM EDT -----  Need eye exam from Dr. Acevedo

## 2024-07-05 ENCOUNTER — HOSPITAL ENCOUNTER (OUTPATIENT)
Dept: BONE DENSITY | Facility: HOSPITAL | Age: 74
Discharge: HOME | End: 2024-07-05
Payer: MEDICARE

## 2024-07-05 DIAGNOSIS — Z78.0 POST-MENOPAUSE: ICD-10-CM

## 2024-07-05 PROCEDURE — 77080 DXA BONE DENSITY AXIAL: CPT

## 2024-08-01 ENCOUNTER — TELEPHONE (OUTPATIENT)
Dept: FAMILY MEDICINE CLINIC | Age: 74
End: 2024-08-01
Payer: MEDICARE

## 2024-08-19 DIAGNOSIS — I10 ESSENTIAL HYPERTENSION: ICD-10-CM

## 2024-08-19 DIAGNOSIS — E78.2 MIXED HYPERLIPIDEMIA: ICD-10-CM

## 2024-08-19 RX ORDER — HYDROCHLOROTHIAZIDE 25 MG/1
25 TABLET ORAL DAILY
Qty: 90 TABLET | Refills: 1 | OUTPATIENT
Start: 2024-08-19

## 2024-08-19 RX ORDER — ATORVASTATIN CALCIUM 10 MG/1
10 TABLET, FILM COATED ORAL DAILY
Qty: 90 TABLET | Refills: 1 | OUTPATIENT
Start: 2024-08-19

## 2024-08-21 ENCOUNTER — TELEPHONE (OUTPATIENT)
Dept: CARDIOLOGY | Facility: CLINIC | Age: 74
End: 2024-08-21
Payer: MEDICARE

## 2024-08-22 DIAGNOSIS — R07.9 CHEST PAIN, UNSPECIFIED TYPE: ICD-10-CM

## 2024-08-22 RX ORDER — ISOSORBIDE MONONITRATE 30 MG/1
30 TABLET, EXTENDED RELEASE ORAL DAILY
Qty: 90 TABLET | Refills: 0 | Status: SHIPPED | OUTPATIENT
Start: 2024-08-22

## 2024-09-03 ENCOUNTER — TELEPHONE (OUTPATIENT)
Dept: CARDIOLOGY | Facility: CLINIC | Age: 74
End: 2024-09-03
Payer: MEDICARE

## 2024-09-03 NOTE — TELEPHONE ENCOUNTER
Left message for patient to call me back to see if she could come in on 9-6 instead of 9-11. Please transfer the call to me.

## 2024-09-11 ENCOUNTER — OFFICE VISIT (OUTPATIENT)
Dept: CARDIOLOGY | Facility: CLINIC | Age: 74
End: 2024-09-11
Payer: MEDICARE

## 2024-09-11 VITALS
HEART RATE: 66 BPM | WEIGHT: 164 LBS | DIASTOLIC BLOOD PRESSURE: 53 MMHG | BODY MASS INDEX: 29.06 KG/M2 | SYSTOLIC BLOOD PRESSURE: 140 MMHG | HEIGHT: 63 IN

## 2024-09-11 DIAGNOSIS — R07.9 CHEST PAIN, UNSPECIFIED TYPE: ICD-10-CM

## 2024-09-11 DIAGNOSIS — E78.2 MIXED HYPERLIPIDEMIA: Primary | ICD-10-CM

## 2024-09-11 DIAGNOSIS — I10 ESSENTIAL HYPERTENSION: ICD-10-CM

## 2024-09-11 PROCEDURE — 3078F DIAST BP <80 MM HG: CPT | Performed by: INTERNAL MEDICINE

## 2024-09-11 PROCEDURE — 1160F RVW MEDS BY RX/DR IN RCRD: CPT | Performed by: INTERNAL MEDICINE

## 2024-09-11 PROCEDURE — 3077F SYST BP >= 140 MM HG: CPT | Performed by: INTERNAL MEDICINE

## 2024-09-11 PROCEDURE — 99214 OFFICE O/P EST MOD 30 MIN: CPT | Performed by: INTERNAL MEDICINE

## 2024-09-11 PROCEDURE — 1159F MED LIST DOCD IN RCRD: CPT | Performed by: INTERNAL MEDICINE

## 2024-09-11 RX ORDER — ISOSORBIDE MONONITRATE 30 MG/1
30 TABLET, EXTENDED RELEASE ORAL DAILY
Qty: 90 TABLET | Refills: 3 | Status: SHIPPED | OUTPATIENT
Start: 2024-09-11

## 2024-09-11 RX ORDER — ATORVASTATIN CALCIUM 10 MG/1
10 TABLET, FILM COATED ORAL DAILY
Qty: 90 TABLET | Refills: 3 | Status: SHIPPED | OUTPATIENT
Start: 2024-09-11

## 2024-09-11 NOTE — ASSESSMENT & PLAN NOTE
Blood pressure is well-controlled.  Recent labs showed normal renal function electrolytes.  Continue hydrochlorothiazide and metoprolol.

## 2024-09-11 NOTE — ASSESSMENT & PLAN NOTE
No episodes of chest pain for the past several years.  A previous SPECT stress test showed small segment of ischemia, however patient opted for medical management.  She is already on aspirin and statin to be continued.  Will continue long-acting nitrates.

## 2024-09-11 NOTE — PROGRESS NOTES
CARDIOLOGY FOLLOW-UP PROGRESS NOTE        Chief Complaint  Hypertension and Hyperlipidemia    Subjective            Zaria Howard presents to [facility]  History of Present Illness    Ms Howard is here for a routine annual follow-up visit.  She had no recent episodes of chest pain.  No shortness of breath or palpitations.  Taking all the medications as prescribed.    Past History:    1) Hypertension; 2) Chest pain with a stress test showing small apical ischemia, opted for medical management; 3) Diabetes mellitus, on oral agents; 4) Hypothyroidism.     Medical History:  Past Medical History:   Diagnosis Date    Diabetes mellitus     Hyperlipidemia     Hypertension        Surgical History: has a past surgical history that includes Hysterectomy and Bladder suspension (2018).     Family History: family history includes Alzheimer's disease (age of onset: 85) in her sister; Aneurysm in her sister; Cancer in her brother; Coronary artery disease in her mother; Stroke in her father.     Social History: reports that she has been smoking cigarettes. She started smoking about 40 years ago. She has a 20.3 pack-year smoking history. She has been exposed to tobacco smoke. She has never used smokeless tobacco. She reports that she does not drink alcohol and does not use drugs.    Allergies: Diphenhydramine and Sudafed [pseudoephedrine hcl]    Current Outpatient Medications on File Prior to Visit   Medication Sig    albuterol sulfate  (90 Base) MCG/ACT inhaler Inhale 1 puff.    aspirin 81 MG EC tablet Take 1 tablet by mouth Daily.    Dulaglutide (Trulicity) 3 MG/0.5ML solution pen-injector Inject 0.5 mL under the skin into the appropriate area as directed Every 7 (Seven) Days.    hydroCHLOROthiazide 25 MG tablet Take 1 tablet by mouth Daily.    meclizine (ANTIVERT) 12.5 MG tablet TAKE 2 TABLETS BY ORAL ROUTE 1 HOUR BEFORE EXPOSURE TO MOTION    metoprolol succinate XL (TOPROL-XL) 25 MG 24 hr  "tablet Take 1 tablet by mouth Daily.    nitroglycerin (NITROSTAT) 0.4 MG SL tablet Place 1 tablet under the tongue Every 5 (Five) Minutes As Needed for Chest Pain. Take no more than 3 doses in 15 minutes.    ondansetron (ZOFRAN) 4 MG tablet Take 1 tablet by mouth.    atorvastatin (LIPITOR) 10 MG tablet Take 1 tablet by mouth Daily.     isosorbide mononitrate (IMDUR) 30 MG 24 hr tablet TAKE 1 TABLET BY MOUTH ONCE DAILY       Review of Systems   Respiratory:  Negative for cough, shortness of breath and wheezing.    Cardiovascular:  Negative for chest pain, palpitations and leg swelling.   Gastrointestinal:  Negative for nausea and vomiting.   Neurological:  Negative for dizziness and syncope.        Objective     /53   Pulse 66   Ht 160 cm (63\")   Wt 74.4 kg (164 lb)   BMI 29.05 kg/m²       Physical Exam    General : Alert, awake, no acute distress  Neck : Supple, no carotid bruit, no jugular venous distention  CVS : Regular rate and rhythm, no murmur, rubs or gallops  Lungs: Clear to auscultation bilaterally, no crackles or rhonchi  Abdomen: Soft, nontender, bowel sounds heard in all 4 quadrants  Extremities: Warm, well-perfused, no pedal edema    Result Review :     The following data was reviewed by: Jose Elias Calderon MD on 09/11/2024:    CMP          9/21/2023    09:14 3/26/2024    09:26 6/26/2024    08:36   CMP   Glucose 181  149  150    BUN 18  18  18    Creatinine 0.82  0.89  0.73    EGFR 76.1  69.0  87.0    Sodium 140  142  141    Potassium 4.0  4.5  3.9    Chloride 102  104  102    Calcium 9.5  9.7  9.7    Total Protein 6.7  6.6  6.9    Albumin 4.1  4.2  4.3    Globulin 2.6  2.4  2.6    Total Bilirubin 0.4  0.4  0.4    Alkaline Phosphatase 92  84  83    AST (SGOT) 15  10  11    ALT (SGPT) 12  13  9    Albumin/Globulin Ratio 1.6  1.8  1.7    BUN/Creatinine Ratio 22.0  20.2  24.7    Anion Gap 13.0  12.0  11.7        TSH          9/21/2023    09:14   TSH   TSH 3.570      Lipid Panel          9/21/2023 "    09:14 3/26/2024    09:26 6/26/2024    08:36   Lipid Panel   Total Cholesterol 136  130  122    Triglycerides 180  128  123    HDL Cholesterol 38  40  42    VLDL Cholesterol 30  23  22    LDL Cholesterol  68  67  58    LDL/HDL Ratio 1.63  1.61  1.32           Data reviewed: Cardiology studies      Echocardiogram done on 11/5/2018 showed     1. Normal left ventricular systolic function with an estimated LV ejection fraction of 55%.  2. Mild to moderate mitral regurgitation.  3. Moderate left atrial enlargement.                     Assessment and Plan        Diagnoses and all orders for this visit:    1. Mixed hyperlipidemia (Primary)  Assessment & Plan:  Most recent LDL is 58, which is at goal.  Continue atorvastatin 10 mg nightly.    Orders:  -     atorvastatin (LIPITOR) 10 MG tablet; Take 1 tablet by mouth Daily.  Dispense: 90 tablet; Refill: 3    2. Essential hypertension  Assessment & Plan:  Blood pressure is well-controlled.  Recent labs showed normal renal function electrolytes.  Continue hydrochlorothiazide and metoprolol.      3. Chest pain, unspecified type  Assessment & Plan:  No episodes of chest pain for the past several years.  A previous SPECT stress test showed small segment of ischemia, however patient opted for medical management.  She is already on aspirin and statin to be continued.  Will continue long-acting nitrates.    Orders:  -     isosorbide mononitrate (IMDUR) 30 MG 24 hr tablet; Take 1 tablet by mouth Daily.  Dispense: 90 tablet; Refill: 3              Follow Up     Return in about 1 year (around 9/11/2025) for Next scheduled follow up.    Patient was given instructions and counseling regarding her condition or for health maintenance advice. Please see specific information pulled into the AVS if appropriate.

## 2024-09-26 ENCOUNTER — OFFICE VISIT (OUTPATIENT)
Dept: FAMILY MEDICINE CLINIC | Age: 74
End: 2024-09-26
Payer: MEDICARE

## 2024-09-26 ENCOUNTER — LAB (OUTPATIENT)
Dept: LAB | Facility: HOSPITAL | Age: 74
End: 2024-09-26
Payer: MEDICARE

## 2024-09-26 VITALS
TEMPERATURE: 98.4 F | HEIGHT: 63 IN | WEIGHT: 162 LBS | SYSTOLIC BLOOD PRESSURE: 141 MMHG | HEART RATE: 78 BPM | DIASTOLIC BLOOD PRESSURE: 62 MMHG | BODY MASS INDEX: 28.7 KG/M2

## 2024-09-26 DIAGNOSIS — E78.2 MIXED HYPERLIPIDEMIA: ICD-10-CM

## 2024-09-26 DIAGNOSIS — Z13.6 SCREENING FOR CARDIOVASCULAR CONDITION: ICD-10-CM

## 2024-09-26 DIAGNOSIS — E11.65 TYPE 2 DIABETES MELLITUS WITH HYPERGLYCEMIA, WITHOUT LONG-TERM CURRENT USE OF INSULIN: ICD-10-CM

## 2024-09-26 DIAGNOSIS — R25.2 LEG CRAMPS: ICD-10-CM

## 2024-09-26 DIAGNOSIS — I10 ESSENTIAL HYPERTENSION: Primary | ICD-10-CM

## 2024-09-26 LAB
ALBUMIN SERPL-MCNC: 4.3 G/DL (ref 3.5–5.2)
ALBUMIN/GLOB SERPL: 1.7 G/DL
ALP SERPL-CCNC: 95 U/L (ref 39–117)
ALT SERPL W P-5'-P-CCNC: 12 U/L (ref 1–33)
ANION GAP SERPL CALCULATED.3IONS-SCNC: 11.7 MMOL/L (ref 5–15)
AST SERPL-CCNC: 12 U/L (ref 1–32)
BILIRUB SERPL-MCNC: 0.5 MG/DL (ref 0–1.2)
BUN SERPL-MCNC: 17 MG/DL (ref 8–23)
BUN/CREAT SERPL: 18.1 (ref 7–25)
CALCIUM SPEC-SCNC: 10 MG/DL (ref 8.6–10.5)
CHLORIDE SERPL-SCNC: 100 MMOL/L (ref 98–107)
CHOLEST SERPL-MCNC: 140 MG/DL (ref 0–200)
CO2 SERPL-SCNC: 27.3 MMOL/L (ref 22–29)
CREAT SERPL-MCNC: 0.94 MG/DL (ref 0.57–1)
DEPRECATED RDW RBC AUTO: 43.1 FL (ref 37–54)
EGFRCR SERPLBLD CKD-EPI 2021: 64.2 ML/MIN/1.73
ERYTHROCYTE [DISTWIDTH] IN BLOOD BY AUTOMATED COUNT: 13 % (ref 12.3–15.4)
GLOBULIN UR ELPH-MCNC: 2.6 GM/DL
GLUCOSE SERPL-MCNC: 182 MG/DL (ref 65–99)
HBA1C MFR BLD: 7.5 % (ref 4.8–5.6)
HCT VFR BLD AUTO: 48.5 % (ref 34–46.6)
HDLC SERPL-MCNC: 36 MG/DL (ref 40–60)
HGB BLD-MCNC: 15.8 G/DL (ref 12–15.9)
LDLC SERPL CALC-MCNC: 70 MG/DL (ref 0–100)
LDLC/HDLC SERPL: 1.76 {RATIO}
MAGNESIUM SERPL-MCNC: 1.6 MG/DL (ref 1.6–2.4)
MCH RBC QN AUTO: 28.6 PG (ref 26.6–33)
MCHC RBC AUTO-ENTMCNC: 32.6 G/DL (ref 31.5–35.7)
MCV RBC AUTO: 87.7 FL (ref 79–97)
PLATELET # BLD AUTO: 244 10*3/MM3 (ref 140–450)
PMV BLD AUTO: 9.6 FL (ref 6–12)
POTASSIUM SERPL-SCNC: 4.1 MMOL/L (ref 3.5–5.2)
PROT SERPL-MCNC: 6.9 G/DL (ref 6–8.5)
RBC # BLD AUTO: 5.53 10*6/MM3 (ref 3.77–5.28)
SODIUM SERPL-SCNC: 139 MMOL/L (ref 136–145)
TRIGL SERPL-MCNC: 203 MG/DL (ref 0–150)
VIT B12 BLD-MCNC: 438 PG/ML (ref 211–946)
VLDLC SERPL-MCNC: 34 MG/DL (ref 5–40)
WBC NRBC COR # BLD AUTO: 11.85 10*3/MM3 (ref 3.4–10.8)

## 2024-09-26 PROCEDURE — 99214 OFFICE O/P EST MOD 30 MIN: CPT | Performed by: NURSE PRACTITIONER

## 2024-09-26 PROCEDURE — 36415 COLL VENOUS BLD VENIPUNCTURE: CPT

## 2024-09-26 PROCEDURE — 3051F HG A1C>EQUAL 7.0%<8.0%: CPT | Performed by: NURSE PRACTITIONER

## 2024-09-26 PROCEDURE — 3078F DIAST BP <80 MM HG: CPT | Performed by: NURSE PRACTITIONER

## 2024-09-26 PROCEDURE — 80053 COMPREHEN METABOLIC PANEL: CPT

## 2024-09-26 PROCEDURE — 85027 COMPLETE CBC AUTOMATED: CPT

## 2024-09-26 PROCEDURE — 1160F RVW MEDS BY RX/DR IN RCRD: CPT | Performed by: NURSE PRACTITIONER

## 2024-09-26 PROCEDURE — 1126F AMNT PAIN NOTED NONE PRSNT: CPT | Performed by: NURSE PRACTITIONER

## 2024-09-26 PROCEDURE — 80061 LIPID PANEL: CPT

## 2024-09-26 PROCEDURE — 83036 HEMOGLOBIN GLYCOSYLATED A1C: CPT

## 2024-09-26 PROCEDURE — 82607 VITAMIN B-12: CPT

## 2024-09-26 PROCEDURE — 1159F MED LIST DOCD IN RCRD: CPT | Performed by: NURSE PRACTITIONER

## 2024-09-26 PROCEDURE — G2211 COMPLEX E/M VISIT ADD ON: HCPCS | Performed by: NURSE PRACTITIONER

## 2024-09-26 PROCEDURE — 3077F SYST BP >= 140 MM HG: CPT | Performed by: NURSE PRACTITIONER

## 2024-09-26 PROCEDURE — 83735 ASSAY OF MAGNESIUM: CPT

## 2024-09-26 RX ORDER — LOSARTAN POTASSIUM 25 MG/1
25 TABLET ORAL DAILY
Qty: 90 TABLET | Refills: 1 | Status: SHIPPED | OUTPATIENT
Start: 2024-09-26

## 2025-01-23 ENCOUNTER — TELEPHONE (OUTPATIENT)
Dept: FAMILY MEDICINE CLINIC | Age: 75
End: 2025-01-23

## 2025-01-23 ENCOUNTER — OFFICE VISIT (OUTPATIENT)
Dept: FAMILY MEDICINE CLINIC | Age: 75
End: 2025-01-23
Payer: MEDICARE

## 2025-01-23 ENCOUNTER — LAB (OUTPATIENT)
Dept: LAB | Facility: HOSPITAL | Age: 75
End: 2025-01-23
Payer: MEDICARE

## 2025-01-23 VITALS
HEART RATE: 74 BPM | OXYGEN SATURATION: 97 % | DIASTOLIC BLOOD PRESSURE: 71 MMHG | TEMPERATURE: 97.2 F | WEIGHT: 165.2 LBS | SYSTOLIC BLOOD PRESSURE: 100 MMHG | BODY MASS INDEX: 29.27 KG/M2 | HEIGHT: 63 IN

## 2025-01-23 DIAGNOSIS — I10 ESSENTIAL HYPERTENSION: ICD-10-CM

## 2025-01-23 DIAGNOSIS — E11.65 TYPE 2 DIABETES MELLITUS WITH HYPERGLYCEMIA, WITHOUT LONG-TERM CURRENT USE OF INSULIN: ICD-10-CM

## 2025-01-23 DIAGNOSIS — Z59.9 FINANCIAL DIFFICULTIES: Primary | ICD-10-CM

## 2025-01-23 DIAGNOSIS — I48.91 ATRIAL FIBRILLATION, UNSPECIFIED TYPE: Primary | ICD-10-CM

## 2025-01-23 DIAGNOSIS — E78.2 MIXED HYPERLIPIDEMIA: ICD-10-CM

## 2025-01-23 LAB
ALBUMIN SERPL-MCNC: 4.1 G/DL (ref 3.5–5.2)
ALBUMIN/GLOB SERPL: 1.5 G/DL
ALP SERPL-CCNC: 72 U/L (ref 39–117)
ALT SERPL W P-5'-P-CCNC: 11 U/L (ref 1–33)
ANION GAP SERPL CALCULATED.3IONS-SCNC: 10.8 MMOL/L (ref 5–15)
AST SERPL-CCNC: 14 U/L (ref 1–32)
BILIRUB SERPL-MCNC: 0.7 MG/DL (ref 0–1.2)
BUN SERPL-MCNC: 24 MG/DL (ref 8–23)
BUN/CREAT SERPL: 26.4 (ref 7–25)
CALCIUM SPEC-SCNC: 9.5 MG/DL (ref 8.6–10.5)
CHLORIDE SERPL-SCNC: 103 MMOL/L (ref 98–107)
CHOLEST SERPL-MCNC: 104 MG/DL (ref 0–200)
CO2 SERPL-SCNC: 25.2 MMOL/L (ref 22–29)
CREAT SERPL-MCNC: 0.91 MG/DL (ref 0.57–1)
EGFRCR SERPLBLD CKD-EPI 2021: 66.8 ML/MIN/1.73
GLOBULIN UR ELPH-MCNC: 2.7 GM/DL
GLUCOSE SERPL-MCNC: 212 MG/DL (ref 65–99)
HBA1C MFR BLD: 7.8 % (ref 4.8–5.6)
HDLC SERPL-MCNC: 33 MG/DL (ref 40–60)
LDLC SERPL CALC-MCNC: 50 MG/DL (ref 0–100)
LDLC/HDLC SERPL: 1.44 {RATIO}
POTASSIUM SERPL-SCNC: 3.6 MMOL/L (ref 3.5–5.2)
PROT SERPL-MCNC: 6.8 G/DL (ref 6–8.5)
SODIUM SERPL-SCNC: 139 MMOL/L (ref 136–145)
TRIGL SERPL-MCNC: 117 MG/DL (ref 0–150)
VLDLC SERPL-MCNC: 21 MG/DL (ref 5–40)

## 2025-01-23 PROCEDURE — 3078F DIAST BP <80 MM HG: CPT | Performed by: NURSE PRACTITIONER

## 2025-01-23 PROCEDURE — 1159F MED LIST DOCD IN RCRD: CPT | Performed by: NURSE PRACTITIONER

## 2025-01-23 PROCEDURE — 80053 COMPREHEN METABOLIC PANEL: CPT

## 2025-01-23 PROCEDURE — 93000 ELECTROCARDIOGRAM COMPLETE: CPT | Performed by: NURSE PRACTITIONER

## 2025-01-23 PROCEDURE — 3074F SYST BP LT 130 MM HG: CPT | Performed by: NURSE PRACTITIONER

## 2025-01-23 PROCEDURE — 99215 OFFICE O/P EST HI 40 MIN: CPT | Performed by: NURSE PRACTITIONER

## 2025-01-23 PROCEDURE — 36415 COLL VENOUS BLD VENIPUNCTURE: CPT

## 2025-01-23 PROCEDURE — 80061 LIPID PANEL: CPT

## 2025-01-23 PROCEDURE — 1160F RVW MEDS BY RX/DR IN RCRD: CPT | Performed by: NURSE PRACTITIONER

## 2025-01-23 PROCEDURE — 1126F AMNT PAIN NOTED NONE PRSNT: CPT | Performed by: NURSE PRACTITIONER

## 2025-01-23 PROCEDURE — 83036 HEMOGLOBIN GLYCOSYLATED A1C: CPT

## 2025-01-23 RX ORDER — ATORVASTATIN CALCIUM 10 MG/1
10 TABLET, FILM COATED ORAL DAILY
Qty: 90 TABLET | Refills: 3 | Status: SHIPPED | OUTPATIENT
Start: 2025-01-23

## 2025-01-23 RX ORDER — METOPROLOL SUCCINATE 25 MG/1
25 TABLET, EXTENDED RELEASE ORAL 2 TIMES DAILY
Qty: 180 TABLET | Refills: 0 | Status: SHIPPED | OUTPATIENT
Start: 2025-01-23

## 2025-01-23 RX ORDER — HYDROCHLOROTHIAZIDE 25 MG/1
25 TABLET ORAL DAILY
Qty: 90 TABLET | Refills: 3 | Status: SHIPPED | OUTPATIENT
Start: 2025-01-23

## 2025-01-23 RX ORDER — METOPROLOL SUCCINATE 25 MG/1
25 TABLET, EXTENDED RELEASE ORAL DAILY
Qty: 90 TABLET | Refills: 3 | Status: SHIPPED | OUTPATIENT
Start: 2025-01-23 | End: 2025-01-23

## 2025-01-23 RX ORDER — LOSARTAN POTASSIUM 25 MG/1
25 TABLET ORAL DAILY
Qty: 90 TABLET | Refills: 3 | Status: SHIPPED | OUTPATIENT
Start: 2025-01-23

## 2025-01-23 SDOH — ECONOMIC STABILITY - INCOME SECURITY: PROBLEM RELATED TO HOUSING AND ECONOMIC CIRCUMSTANCES, UNSPECIFIED: Z59.9

## 2025-01-23 NOTE — TELEPHONE ENCOUNTER
Caller: Zaria Howard    Relationship: Self    Best call back number: [phone]     Which medication are you concerned about: NEW MEDICATION PRESCRIBED ON 01.23.2025    Who prescribed you this medication: NEW HEART MEDICATION    When did you start taking this medication: HAS NOT STARTED TAKING YET    What are your concerns: MEDICATION IS $500 EVEN WITH HER INSURANCE.

## 2025-01-23 NOTE — PROGRESS NOTES
Chief Complaint  Zaria Howard presents to [facility] for Diabetes (Follow up )    Subjective     History of Present Illness  Zaria is in today to follow up on chronic conditions.  She is also reporting that back in September, she got an RSV vaccine at [facility] and has since been struggling with a wound at the injection site.  She has never had reaction to vaccines in the past     Her leg cramps have gotten better since taking B12 and vitamin D    Zaria presents today for follow up on hypertension.    Current medication treatment includes  losartan 25 daily mg, metoprolol succinate 25 daily  mg, and hydrochlorothiazide 25 daily  mg, and is compliant with medications.   Side effects reported :  No  Home blood pressure monitoring readings reported as home BP checks: not checked  Medication changes are not recommended at this time .    Upon exam today, Zaria has cardiac arrhythmia we will get an EKG.  She denies any palpitations or shortness of breath.  Her blood pressure is on the lower end of where she normally is in the office.  She denies any history of arrhythmias or atrial fib.  She is not currently ill but just feeling congested.  Other than the vaccine area on her right arm from September, no adverse health events since last visit She is under the care of cardiology with no previous complaints and follows up annually with Dr. Calderon    Diabetes type:  Type 2    A1C Last 3 Results          3/26/2024    09:26 2024    08:36 2024    09:51   HGBA1C Last 3 Results   Hemoglobin A1C 7.50  7.50  7.50      Microalbumin          3/26/2024    09:26   Microalbumin   Microalbumin, Urine <1.2        Current diabetic medications include Trulicity(dulaglutide) 3 mg    Diabetic Review of Systems:  Medication compliance: compliant all of the time  Diabetic diet compliance: noncompliant much of the time  Blood sugar lo-130's  Any hypoglycemic episodes? - No    Is She on ACE  inhibitor or angiotensin II receptor blocker and a statin?  losartan 25 mg    atorvastatin (lipitor) 10 mg               Assessment and Plan     Diagnoses and all orders for this visit:    1. Atrial fibrillation, unspecified type (Primary)  Comments:  D/w Dr. Calderon- abdon Nicolas, increase metoprolol BID high risk for emoblitic event due to comorbid conditions/ age ;  appt with cards 1/30  Orders:  -     ECG 12 Lead  -     apixaban (ELIQUIS) 5 MG tablet tablet; Take 1 tablet by mouth 2 (Two) Times a Day.  Dispense: 60 tablet; Refill: 0  -     metoprolol succinate XL (TOPROL-XL) 25 MG 24 hr tablet; Take 1 tablet by mouth 2 (Two) Times a Day.  Dispense: 180 tablet; Refill: 0    2. Type 2 diabetes mellitus with hyperglycemia, without long-term current use of insulin  Comments:  labs  continue current treatment   f/u 3 months  Orders:  -     Hemoglobin A1c; Future  -     Comprehensive metabolic panel; Future  -     Lipid panel; Future  -     Dulaglutide 3 MG/0.5ML solution auto-injector; Inject 3 mg under the skin into the appropriate area as directed 1 (One) Time Per Week. DO NOT INCREASE DOSE UNLESS DISCUSSED WITH PROVIDER  Dispense: 6 mL; Refill: 1    3. Essential hypertension  Comments:  continue current treatment   f/u next scheduled appt  Orders:  -     hydroCHLOROthiazide 25 MG tablet; Take 1 tablet by mouth Daily.  Dispense: 90 tablet; Refill: 3  -     losartan (Cozaar) 25 MG tablet; Take 1 tablet by mouth Daily.  Dispense: 90 tablet; Refill: 3  -     Discontinue: metoprolol succinate XL (TOPROL-XL) 25 MG 24 hr tablet; Take 1 tablet by mouth Daily.  Dispense: 90 tablet; Refill: 3  -     metoprolol succinate XL (TOPROL-XL) 25 MG 24 hr tablet; Take 1 tablet by mouth 2 (Two) Times a Day.  Dispense: 180 tablet; Refill: 0    4. Essential hypertension  -     hydroCHLOROthiazide 25 MG tablet; Take 1 tablet by mouth Daily.  Dispense: 90 tablet; Refill: 3  -     losartan (Cozaar) 25 MG tablet; Take 1 tablet by mouth  Daily.  Dispense: 90 tablet; Refill: 3  -     Discontinue: metoprolol succinate XL (TOPROL-XL) 25 MG 24 hr tablet; Take 1 tablet by mouth Daily.  Dispense: 90 tablet; Refill: 3  -     metoprolol succinate XL (TOPROL-XL) 25 MG 24 hr tablet; Take 1 tablet by mouth 2 (Two) Times a Day.  Dispense: 180 tablet; Refill: 0    5. Mixed hyperlipidemia  -     atorvastatin (LIPITOR) 10 MG tablet; Take 1 tablet by mouth Daily.  Dispense: 90 tablet; Refill: 3        I spent 45 minutes caring for Zaria on this date of service. This time includes time spent by me in the following activities:reviewing tests, obtaining and/or reviewing a separately obtained history, performing a medically appropriate examination and/or evaluation , counseling and educating the patient/family/caregiver, ordering medications, tests, or procedures, referring and communicating with other health care professionals , documenting information in the medical record, and care coordination    Follow Up   Return in about 3 months (around 4/23/2025) for Recheck - with cardiology as scheduled on 1/30/2025.  Future Appointments   Date Time Provider Department Center   1/30/2025 11:15 AM Denice Yao APRN [facility]   5/28/2025  8:15 AM Karoline Harman APRN [facility]   9/17/2025  1:00 PM Jose Elias Calderon MD [facility]       New Medications Ordered This Visit   Medications    hydroCHLOROthiazide 25 MG tablet     Sig: Take 1 tablet by mouth Daily.     Dispense:  90 tablet     Refill:  3     This prescription was filled on 6/26/2023. Any refills authorized will be placed on file.    losartan (Cozaar) 25 MG tablet     Sig: Take 1 tablet by mouth Daily.     Dispense:  90 tablet     Refill:  3    atorvastatin (LIPITOR) 10 MG tablet     Sig: Take 1 tablet by mouth Daily.     Dispense:  90 tablet     Refill:  3     This prescription was filled on 10/2/2023. Any refills authorized will be placed on file.    Dulaglutide 3 MG/0.5ML solution  "auto-injector     Sig: Inject 3 mg under the skin into the appropriate area as directed 1 (One) Time Per Week. DO NOT INCREASE DOSE UNLESS DISCUSSED WITH PROVIDER     Dispense:  6 mL     Refill:  1    apixaban (ELIQUIS) 5 MG tablet tablet     Sig: Take 1 tablet by mouth 2 (Two) Times a Day.     Dispense:  60 tablet     Refill:  0    metoprolol succinate XL (TOPROL-XL) 25 MG 24 hr tablet     Sig: Take 1 tablet by mouth 2 (Two) Times a Day.     Dispense:  180 tablet     Refill:  0     PLEASE NOTE FREQUENCY CHANGE       Medications Discontinued During This Encounter   Medication Reason    Dulaglutide (Trulicity) 3 MG/0.5ML solution pen-injector Other- See Medication Note    hydroCHLOROthiazide 25 MG tablet Reorder    metoprolol succinate XL (TOPROL-XL) 25 MG 24 hr tablet Reorder    atorvastatin (LIPITOR) 10 MG tablet Reorder    losartan (Cozaar) 25 MG tablet Reorder    metoprolol succinate XL (TOPROL-XL) 25 MG 24 hr tablet           Review of Systems    Objective     Vitals:    01/23/25 0821   BP: 100/71   BP Location: Right arm   Patient Position: Sitting   Cuff Size: Adult   Pulse: 74   Temp: 97.2 °F (36.2 °C)   TempSrc: Temporal   SpO2: 97%   Weight: 74.9 kg (165 lb 3.2 oz)   Height: 160 cm (62.99\")            Physical Exam  Constitutional:       General: She is not in acute distress.     Appearance: Normal appearance.   HENT:      Head: Normocephalic.   Cardiovascular:      Rate and Rhythm: Tachycardia present. Rhythm irregular.      Heart sounds: No murmur heard.  Pulmonary:      Effort: Pulmonary effort is normal.      Breath sounds: Normal breath sounds.   Musculoskeletal:         General: Normal range of motion.   Neurological:      General: No focal deficit present.      Mental Status: She is alert and oriented to person, place, and time.   Psychiatric:         Mood and Affect: Mood normal.         Behavior: Behavior normal.            ECG 12 Lead    Date/Time: 1/23/2025 1:27 PM  Performed by: Karoline Harman, " DOLORES    Authorized by: Karoline Harman APRN  Comparison: compared with previous ECG from 6/16/2021  Comparison to previous ECG: Significant changes  Rhythm: atrial fibrillation  Rate: tachycardic  QRS axis: normal    Clinical impression: abnormal EKG  Comments: Contacted Dr. Calderon, he has scheduled her with their office on 1/30/2025 - added Eliquis and increase metoprolol to BID - mjm          Result Review          Allergies   Allergen Reactions    Diphenhydramine Other (See Comments)     NUMB IN FACE    Sudafed [Pseudoephedrine Hcl] Swelling      Past Medical History:   Diagnosis Date    Diabetes mellitus     Hyperlipidemia     Hypertension      Current Outpatient Medications   Medication Sig Dispense Refill    albuterol sulfate  (90 Base) MCG/ACT inhaler Inhale 1 puff.      aspirin 81 MG EC tablet Take 1 tablet by mouth Daily.      atorvastatin (LIPITOR) 10 MG tablet Take 1 tablet by mouth Daily. 90 tablet 3    hydroCHLOROthiazide 25 MG tablet Take 1 tablet by mouth Daily. 90 tablet 3    isosorbide mononitrate (IMDUR) 30 MG 24 hr tablet Take 1 tablet by mouth Daily. 90 tablet 3    losartan (Cozaar) 25 MG tablet Take 1 tablet by mouth Daily. 90 tablet 3    meclizine (ANTIVERT) 12.5 MG tablet TAKE 2 TABLETS BY ORAL ROUTE 1 HOUR BEFORE EXPOSURE TO MOTION 30 tablet 0    metoprolol succinate XL (TOPROL-XL) 25 MG 24 hr tablet Take 1 tablet by mouth 2 (Two) Times a Day. 180 tablet 0    nitroglycerin (NITROSTAT) 0.4 MG SL tablet Place 1 tablet under the tongue Every 5 (Five) Minutes As Needed for Chest Pain. Take no more than 3 doses in 15 minutes. 25 tablet 2    ondansetron (ZOFRAN) 4 MG tablet Take 1 tablet by mouth.      apixaban (ELIQUIS) 5 MG tablet tablet Take 1 tablet by mouth 2 (Two) Times a Day. 60 tablet 0    Dulaglutide 3 MG/0.5ML solution auto-injector Inject 3 mg under the skin into the appropriate area as directed 1 (One) Time Per Week. DO NOT INCREASE DOSE UNLESS DISCUSSED WITH PROVIDER 6 mL 1      No current facility-administered medications for this visit.     Past Surgical History:   Procedure Laterality Date    BLADDER SUSPENSION  2018    HYSTERECTOMY        Health Maintenance Due   Topic Date Due    LUNG CANCER SCREENING  11/03/2021    ANNUAL WELLNESS VISIT  03/26/2025    HEMOGLOBIN A1C  03/26/2025      Immunization History   Administered Date(s) Administered    Arexvy (RSV, Adults 60+ yrs) 11/08/2024    COVID-19 (MODERNA) 1st,2nd,3rd Dose Monovalent 03/04/2021, 04/01/2021, 10/25/2021    COVID-19 (UNSPECIFIED) 07/07/2022    TD Preservative Free (Tenivac) 07/20/2005         Part of this note may be an electronic transcription/translation of spoken language to printed   text using the Dragon Dictation System.      Karoline Harman, APRN

## 2025-01-24 ENCOUNTER — REFERRAL TRIAGE (OUTPATIENT)
Age: 75
End: 2025-01-24
Payer: MEDICARE

## 2025-01-24 NOTE — TELEPHONE ENCOUNTER
Pt informed again that Kath Kevin with Dr Calderon sent a coupon for 30 days free to [facility] . I advised pt she needs to start on medication today . She is worried about the cost after 30 days I advised that Dr Calderon will address that and we are working on Harriet Cullen referral for pt assistance

## 2025-01-27 ENCOUNTER — PATIENT OUTREACH (OUTPATIENT)
Age: 75
End: 2025-01-27
Payer: MEDICARE

## 2025-01-27 NOTE — OUTREACH NOTE
Social Work Assessment  Questions/Answers      Flowsheet Row Most Recent Value   Referral Source physician   Reason for Consult community resources, financial concerns   Preferred Language English   Advance Care Planning Reviewed no concerns identified   Decision Making Considerations patient/family ability to make health care decisions   People in Home alone   Current Living Arrangements home   In the past 12 months has the electric, gas, oil, or water company threatened to shut off services in your home? No   Primary Care Provided by self   Family Caregiver if Needed sibling(s), child(shani), adult   Quality of Family Relationships helpful   Source of Income social security   Financial/Environmental Concerns unable to afford medication(s)   Application for Public Assistance obtained public assistance pending number   Do you want help finding or keeping work or a job? I do not need or want help          SDOH updated and reviewed with the patient during this program:  --     Disabilities: Not At Risk (1/27/2025)    Disabilities     Concentrating, Remembering, or Making Decisions Difficulty: no     Doing Errands Independently Difficulty: no      --     Employment: Not At Risk (1/27/2025)    Employment     Do you want help finding or keeping work or a job?: I do not need or want help      Financial Resource Strain: High Risk (1/27/2025)    Overall Financial Resource Strain (CARDIA)     Difficulty of Paying Living Expenses: Hard      --      Received from [employer]    Food Insecurity      --     Housing Stability: Unknown (1/27/2025)    Housing Stability     Current Living Arrangements: home      --     Transportation Needs: No Transportation Needs (1/27/2025)    PRAPARE - Transportation     Lack of Transportation (Medical): No     Lack of Transportation (Non-Medical): No      --     Utilities: Not At Risk (1/27/2025)    [facility] Utilities     Threatened with loss of utilities: No      Continuing Care    Community & DME   [facility] [place] [facility]    [id number], [place] [address]    Phone: [phone]    Request Status: Pending - No Request Sent    Services: Financial Assistance    Resource for: Financial Resource Strain, Utilities     Patient Outreach    MSW spoke with patient to discuss resources needed. Patient states that her Eliquis will be over $500 but she has a 30 day coupon. MSW spoke with patient about patient assistance and she goes to [facility] for Trulicity assistance, so she will go to [facility] tomorrow to fill out BMS PAP. Patient aware of their services through [place] prescription assistance program.     MSW did assist with the Low Income Subsidy patient application through [facility] office. Patient to complete the remaining parts through [facility] with Eliquis costs.They will fax to prescriber for their assistance on application.    Harriet ESTRADA -   Ambulatory Case Management    1/27/2025, 13:51 EST

## 2025-01-28 DIAGNOSIS — E11.65 TYPE 2 DIABETES MELLITUS WITH HYPERGLYCEMIA, WITHOUT LONG-TERM CURRENT USE OF INSULIN: ICD-10-CM

## 2025-01-30 ENCOUNTER — OFFICE VISIT (OUTPATIENT)
Age: 75
End: 2025-01-30
Payer: MEDICARE

## 2025-01-30 VITALS
SYSTOLIC BLOOD PRESSURE: 105 MMHG | HEIGHT: 63 IN | BODY MASS INDEX: 28.88 KG/M2 | DIASTOLIC BLOOD PRESSURE: 61 MMHG | WEIGHT: 163 LBS | HEART RATE: 77 BPM

## 2025-01-30 DIAGNOSIS — I10 ESSENTIAL HYPERTENSION: ICD-10-CM

## 2025-01-30 DIAGNOSIS — E78.2 MIXED HYPERLIPIDEMIA: ICD-10-CM

## 2025-01-30 DIAGNOSIS — R07.9 CHEST PAIN, UNSPECIFIED TYPE: ICD-10-CM

## 2025-01-30 DIAGNOSIS — I48.91 ATRIAL FIBRILLATION, UNSPECIFIED TYPE: Primary | ICD-10-CM

## 2025-01-30 DIAGNOSIS — R94.31 ABNORMAL ELECTROCARDIOGRAM (ECG) (EKG): ICD-10-CM

## 2025-01-30 NOTE — PROGRESS NOTES
Chief Complaint  Hypertension, Hyperlipidemia, Chest Pain, Atrial Fibrillation (Newly diagnosed), and Follow-up    Subjective        History of Present Illness  Zaria Howard presents to [facility]   Ms. Howard   is a 74 year old female that presents to clinic for follow up on chest pain, hypertension, mixed hyperlipidemia, and newly diagnosed atrial fibrillation. Patient denies any chest pain, palpitations, shortness of breath, dizziness, or edema. She recently saw her PCP on 1/23/2025  a cardiac arrhythmia was noted and EKG showed atrial fibrillation. She has no known history of arrhythmias or atrial afib. She was started on Eliquis 5 mg BID and metoprolol XL 25 mg was increased to BID dosing.  She denies any bleeding issues but does report she is staying fatigue since medication adjustment.       Past Medical History:   Diagnosis Date    Diabetes mellitus     Hyperlipidemia     Hypertension        Allergies   Allergen Reactions    Diphenhydramine Other (See Comments)     NUMB IN FACE    Sudafed [Pseudoephedrine Hcl] Swelling        Past Surgical History:   Procedure Laterality Date    BLADDER SUSPENSION  2018    HYSTERECTOMY          Social History  She  reports that she has been smoking cigarettes. She started smoking about 41 years ago. She has a 20.5 pack-year smoking history. She has been exposed to tobacco smoke. She has never used smokeless tobacco. She reports that she does not drink alcohol and does not use drugs.    Family History  Her family history includes Alzheimer's disease (age of onset: 85) in her sister; Aneurysm in her sister; Cancer in her brother; Coronary artery disease in her mother; Stroke in her father.       Current Outpatient Medications on File Prior to Visit   Medication Sig    albuterol sulfate  (90 Base) MCG/ACT inhaler Inhale 1 puff.    apixaban (ELIQUIS) 5 MG tablet tablet Take 1 tablet by mouth 2 (Two) Times a Day.    aspirin 81 MG EC tablet  "Take 1 tablet by mouth Daily.    atorvastatin (LIPITOR) 10 MG tablet Take 1 tablet by mouth Daily.    Dulaglutide 4.5 MG/0.5ML solution auto-injector Inject 4.5 mg under the skin into the appropriate area as directed 1 (One) Time Per Week. MAXIMUM DOSE - DO NOT TAKE MORE THAN PRESCRIBED    hydroCHLOROthiazide 25 MG tablet Take 1 tablet by mouth Daily.    isosorbide mononitrate (IMDUR) 30 MG 24 hr tablet Take 1 tablet by mouth Daily.    losartan (Cozaar) 25 MG tablet Take 1 tablet by mouth Daily.    meclizine (ANTIVERT) 12.5 MG tablet TAKE 2 TABLETS BY ORAL ROUTE 1 HOUR BEFORE EXPOSURE TO MOTION    metoprolol succinate XL (TOPROL-XL) 25 MG 24 hr tablet Take 1 tablet by mouth 2 (Two) Times a Day.    nitroglycerin (NITROSTAT) 0.4 MG SL tablet Place 1 tablet under the tongue Every 5 (Five) Minutes As Needed for Chest Pain. Take no more than 3 doses in 15 minutes.    ondansetron (ZOFRAN) 4 MG tablet Take 1 tablet by mouth.     No current facility-administered medications on file prior to visit.         Review of Systems   Constitutional:  Positive for fatigue.   Respiratory:  Negative for cough, chest tightness and shortness of breath.    Cardiovascular:  Negative for chest pain, palpitations and leg swelling.   Gastrointestinal:  Negative for nausea and vomiting.   Neurological:  Negative for dizziness and syncope.        Objective   Vitals:    01/30/25 1104   BP: 105/61   Pulse: 77   Weight: 73.9 kg (163 lb)   Height: 160 cm (63\")         Physical Exam  General : Alert, awake, no acute distress  Neck : Supple, no carotid bruit, no jugular venous distention  CVS : Irregular rhythm, no murmur, no rubs or gallops  Lungs: Clear to auscultation bilaterally, no crackles or rhonchi  Abdomen: Soft, nontender, bowel sounds active  Extremities: Warm, well-perfused, no pedal edema    Result Review     The following data was reviewed by DOLORES Reyes  No results found for: \"PROBNP\"  CMP          6/26/2024    08:36 9/26/2024 " "   09:51 1/23/2025    09:32   CMP   Glucose 150  182  212    BUN 18  17  24    Creatinine 0.73  0.94  0.91    EGFR 87.0  64.2  66.8    Sodium 141  139  139    Potassium 3.9  4.1  3.6    Chloride 102  100  103    Calcium 9.7  10.0  9.5    Total Protein 6.9  6.9  6.8    Albumin 4.3  4.3  4.1    Globulin 2.6  2.6  2.7    Total Bilirubin 0.4  0.5  0.7    Alkaline Phosphatase 83  95  72    AST (SGOT) 11  12  14    ALT (SGPT) 9  12  11    Albumin/Globulin Ratio 1.7  1.7  1.5    BUN/Creatinine Ratio 24.7  18.1  26.4    Anion Gap 11.7  11.7  10.8      CBC w/diff          9/26/2024    09:51   CBC w/Diff   WBC 11.85    RBC 5.53    Hemoglobin 15.8    Hematocrit 48.5    MCV 87.7    MCH 28.6    MCHC 32.6    RDW 13.0    Platelets 244       Lab Results   Component Value Date    TSH 3.570 09/21/2023      No results found for: \"FREET4\"   No results found for: \"DDIMERQUANT\"  Magnesium   Date Value Ref Range Status   09/26/2024 1.6 1.6 - 2.4 mg/dL Final      No results found for: \"DIGOXIN\"   No results found for: \"TROPONINT\"        Lipid Panel          6/26/2024    08:36 9/26/2024    09:51 1/23/2025    09:32   Lipid Panel   Total Cholesterol 122  140  104    Triglycerides 123  203  117    HDL Cholesterol 42  36  33    VLDL Cholesterol 22  34  21    LDL Cholesterol  58  70  50    LDL/HDL Ratio 1.32  1.76  1.44          Results for orders placed in visit on 06/07/21    Intracardiac echocardiogram             Assessment and Plan   Diagnoses and all orders for this visit:    1. Atrial fibrillation, unspecified type (Primary)  Assessment & Plan:  Newly diagnosed with afib , remains in afib, but with controlled ventricular rate, and denies symptoms of palpitations.  continue elqiuis 5 mg BID and metoprolol XL 25 mg BID for rate control , will order echocardiogram and CBC . Samples of Eliquis was provided to patient at visit along with patient assistance information.     Orders:  -     Adult Transthoracic Echo Complete W/ Cont if Necessary " Per Protocol; Future  -     CBC (No Diff); Future    2. Essential hypertension  Assessment & Plan:  Blood pressure is well-controlled today it was on lower side due to newly increased metoprolol . Recent labs showed normal renal function electrolytes. Continue hydrochlorothiazide 25 mg daily , losartan 25 mg daily and metoprolol XL 25 mg BID      3. Mixed hyperlipidemia  Assessment & Plan:   Most recently LDL 6/2024 is 58, which is at goal. Continue atorvastatin 10 mg nightly       4. Chest pain, unspecified type  Assessment & Plan:  No episodes of chest pain for the past several years. A previous SPECT stress test showed small segment of ischemia, however patient opted for medical management. She is already on aspirin and statin to be continued. Will continue long-acting nitrates.       Other orders  -     Discontinue: apixaban (ELIQUIS) 5 MG tablet tablet; Take 1 tablet by mouth 2 (Two) Times a Day.  Dispense: 60 tablet; Refill: 0  -     apixaban (ELIQUIS) 5 MG tablet tablet; Take 1 tablet by mouth 2 (Two) Times a Day.  Dispense: 60 tablet; Refill: 0                Follow Up   Return in about 4 months (around 5/30/2025) for Dr Yumiko abreu cancel September appt.    Patient was given instructions and counseling regarding her condition or for health maintenance advice. Please see specific information pulled into the AVS if appropriate.       Seen with DOLORES Martinez  Signed,  DOLORES Reyes  01/30/2025     Dictated Utilizing Dragon Dictation: Please note that portions of this note were completed with a voice recognition program.  Part of this note may be an electronic transcription/translation of spoken language to printed text using the Dragon Dictation System.

## 2025-01-30 NOTE — ASSESSMENT & PLAN NOTE
Newly diagnosed with afib , remains in afib, but with controlled ventricular rate, and denies symptoms of palpitations.  continue elqiuis 5 mg BID and metoprolol XL 25 mg BID for rate control , will order echocardiogram and CBC . Samples of Eliquis was provided to patient at visit along with patient assistance information.

## 2025-01-30 NOTE — ASSESSMENT & PLAN NOTE
Blood pressure is well-controlled today it was on lower side due to newly increased metoprolol . Recent labs showed normal renal function electrolytes. Continue hydrochlorothiazide 25 mg daily , losartan 25 mg daily and metoprolol XL 25 mg BID

## 2025-01-30 NOTE — PROGRESS NOTES
Chief Complaint  Hypertension, Hyperlipidemia, Chest Pain, Atrial Fibrillation (Newly diagnosed), and Follow-up    Subjective        Hypertension  Pertinent negatives include no chest pain, palpitations or shortness of breath.   Hyperlipidemia  Pertinent negatives include no chest pain or shortness of breath.   Chest Pain   Pertinent negatives include no abdominal pain, cough, dizziness, nausea, palpitations, shortness of breath or vomiting.   Her past medical history is significant for hyperlipidemia.   Atrial Fibrillation  Symptoms are negative for chest pain, dizziness, palpitations and shortness of breath. Past medical history includes atrial fibrillation and hyperlipidemia.     Zaria Howard presents to [facility] CARDIOLOGY   Ms. Howard  is a 74 year old female that presents to clinic for follow up on chest pain, hypertension, mixed hyperlipidemia, and newly diagnosed atrial fibrillation. Patient denies any chest pain, palpitations, shortness of breath, dizziness, or edema. She recently saw her PCP on 1/23/2025  a cardiac arrhythmia was noted and EKG showed atrial fibrillation. She has no known history of arrhythmias or atrial afib. She was started on Eliquis 5 mg BID and metoprolol XL 25 mg was increased to BID dosing. She denies any bleeding issues but does report she is staying fatigue since medication adjustment.     Past Medical History:   Diagnosis Date    Diabetes mellitus     Hyperlipidemia     Hypertension        Allergies   Allergen Reactions    Diphenhydramine Other (See Comments)     NUMB IN FACE    Sudafed [Pseudoephedrine Hcl] Swelling        Past Surgical History:   Procedure Laterality Date    BLADDER SUSPENSION  2018    HYSTERECTOMY          Social History  She  reports that she has been smoking cigarettes. She started smoking about 41 years ago. She has a 20.5 pack-year smoking history. She has been exposed to tobacco smoke. She has never used smokeless tobacco. She reports  that she does not drink alcohol and does not use drugs.    Family History  Her family history includes Alzheimer's disease (age of onset: 85) in her sister; Aneurysm in her sister; Cancer in her brother; Coronary artery disease in her mother; Stroke in her father.       Current Outpatient Medications on File Prior to Visit   Medication Sig    albuterol sulfate  (90 Base) MCG/ACT inhaler Inhale 1 puff.    apixaban (ELIQUIS) 5 MG tablet tablet Take 1 tablet by mouth 2 (Two) Times a Day.    aspirin 81 MG EC tablet Take 1 tablet by mouth Daily.    atorvastatin (LIPITOR) 10 MG tablet Take 1 tablet by mouth Daily.    Dulaglutide 4.5 MG/0.5ML solution auto-injector Inject 4.5 mg under the skin into the appropriate area as directed 1 (One) Time Per Week. MAXIMUM DOSE - DO NOT TAKE MORE THAN PRESCRIBED    hydroCHLOROthiazide 25 MG tablet Take 1 tablet by mouth Daily.    isosorbide mononitrate (IMDUR) 30 MG 24 hr tablet Take 1 tablet by mouth Daily.    losartan (Cozaar) 25 MG tablet Take 1 tablet by mouth Daily.    meclizine (ANTIVERT) 12.5 MG tablet TAKE 2 TABLETS BY ORAL ROUTE 1 HOUR BEFORE EXPOSURE TO MOTION    metoprolol succinate XL (TOPROL-XL) 25 MG 24 hr tablet Take 1 tablet by mouth 2 (Two) Times a Day.    nitroglycerin (NITROSTAT) 0.4 MG SL tablet Place 1 tablet under the tongue Every 5 (Five) Minutes As Needed for Chest Pain. Take no more than 3 doses in 15 minutes.    ondansetron (ZOFRAN) 4 MG tablet Take 1 tablet by mouth.     No current facility-administered medications on file prior to visit.         Review of Systems   Constitutional:  Positive for fatigue.   Respiratory:  Negative for cough, shortness of breath and wheezing.    Cardiovascular:  Negative for chest pain, palpitations and leg swelling.   Gastrointestinal:  Negative for abdominal pain, blood in stool, nausea and vomiting.   Neurological:  Negative for dizziness and light-headedness.        Objective   Vitals:    01/30/25 1104   BP: 105/61  "  Pulse: 77   Weight: 73.9 kg (163 lb)   Height: 160 cm (63\")         Physical Exam  General : Alert, awake, no acute distress  Neck : Supple, no carotid bruit, no jugular venous distention  CVS : Regular rate and irregular rhythm, no murmur, no rubs or gallops  Lungs: Clear to auscultation bilaterally, no crackles or rhonchi  Abdomen: Soft, nontender, bowel sounds active  Extremities: Warm, well-perfused, no pedal edema      Result Review     The following data was reviewed by DOLORES Martinez  No results found for: \"PROBNP\"  CMP          6/26/2024    08:36 9/26/2024    09:51 1/23/2025    09:32   CMP   Glucose 150  182  212    BUN 18  17  24    Creatinine 0.73  0.94  0.91    EGFR 87.0  64.2  66.8    Sodium 141  139  139    Potassium 3.9  4.1  3.6    Chloride 102  100  103    Calcium 9.7  10.0  9.5    Total Protein 6.9  6.9  6.8    Albumin 4.3  4.3  4.1    Globulin 2.6  2.6  2.7    Total Bilirubin 0.4  0.5  0.7    Alkaline Phosphatase 83  95  72    AST (SGOT) 11  12  14    ALT (SGPT) 9  12  11    Albumin/Globulin Ratio 1.7  1.7  1.5    BUN/Creatinine Ratio 24.7  18.1  26.4    Anion Gap 11.7  11.7  10.8      CBC w/diff          9/26/2024    09:51   CBC w/Diff   WBC 11.85    RBC 5.53    Hemoglobin 15.8    Hematocrit 48.5    MCV 87.7    MCH 28.6    MCHC 32.6    RDW 13.0    Platelets 244       Lab Results   Component Value Date    TSH 3.570 09/21/2023      No results found for: \"FREET4\"   No results found for: \"DDIMERQUANT\"  Magnesium   Date Value Ref Range Status   09/26/2024 1.6 1.6 - 2.4 mg/dL Final      No results found for: \"DIGOXIN\"   No results found for: \"TROPONINT\"        Lipid Panel          6/26/2024    08:36 9/26/2024    09:51 1/23/2025    09:32   Lipid Panel   Total Cholesterol 122  140  104    Triglycerides 123  203  117    HDL Cholesterol 42  36  33    VLDL Cholesterol 22  34  21    LDL Cholesterol  58  70  50    LDL/HDL Ratio 1.32  1.76  1.44          Results for orders placed in visit on " 06/07/21    Intracardiac echocardiogram             Assessment and Plan   Diagnoses and all orders for this visit:    1. Atrial fibrillation, unspecified type (Primary)  Assessment & Plan:  Newly diagnosed with afib , continue elqiuis 5 mg BID and metoprolol XL 25 mg BID , will order echocardiogram and CBC . Samples of Eliquis was provided to patient at visit along with patient assistance information.       2. Essential hypertension  Assessment & Plan:  Blood pressure is well-controlled today it was on lower side due to newly increased metoprolol . Recent labs showed normal renal function electrolytes. Continue hydrochlorothiazide 25 mg daily , losartan 25 mg daily and metoprolol XL 25 mg BID      3. Mixed hyperlipidemia  Assessment & Plan:   Most recently LDL 6/2024 is 58, which is at goal. Continue atorvastatin 10 mg nightly       4. Chest pain, unspecified type  Assessment & Plan:  No episodes of chest pain for the past several years. A previous SPECT stress test showed small segment of ischemia, however patient opted for medical management. She is already on aspirin and statin to be continued. Will continue long-acting nitrates.                  Follow Up   Return in about 4 months (around 5/30/2025) for Dr Yumiko alarcon September appt.      Zaria Howard  reports that she has been smoking cigarettes. She started smoking about 41 years ago. She has a 20.5 pack-year smoking history. She has been exposed to tobacco smoke. She has never used smokeless tobacco.            Patient was given instructions and counseling regarding her condition or for health maintenance advice. Please see specific information pulled into the AVS if appropriate.     Signed,  Marylou Gomez, APRN  01/30/2025     Dictated Utilizing Dragon Dictation: Please note that portions of this note were completed with a voice recognition program.  Part of this note may be an electronic transcription/translation of spoken language to printed text  using the Dragon Dictation System.

## 2025-02-07 DIAGNOSIS — R07.9 CHEST PAIN, UNSPECIFIED TYPE: ICD-10-CM

## 2025-02-07 RX ORDER — NITROGLYCERIN 0.4 MG/1
TABLET SUBLINGUAL
Qty: 25 TABLET | Refills: 2 | Status: SHIPPED | OUTPATIENT
Start: 2025-02-07

## 2025-02-14 ENCOUNTER — HOSPITAL ENCOUNTER (OUTPATIENT)
Facility: HOSPITAL | Age: 75
Discharge: HOME | End: 2025-02-14
Payer: MEDICARE

## 2025-02-14 DIAGNOSIS — I48.91 ATRIAL FIBRILLATION, UNSPECIFIED TYPE: ICD-10-CM

## 2025-02-14 DIAGNOSIS — R94.31 ABNORMAL ELECTROCARDIOGRAM (ECG) (EKG): ICD-10-CM

## 2025-02-14 LAB
AORTIC DIMENSIONLESS INDEX: 0.58 (DI)
ASCENDING AORTA: 3 CM
AV MEAN PRESS GRAD SYS DOP V1V2: 4 MMHG
AV VMAX SYS DOP: 136 CM/SEC
BH CV ECHO MEAS - ACS: 1.7 CM
BH CV ECHO MEAS - AO MAX PG: 7.4 MMHG
BH CV ECHO MEAS - AO ROOT DIAM: 3 CM
BH CV ECHO MEAS - AO V2 VTI: 24.1 CM
BH CV ECHO MEAS - AVA(I,D): 1.99 CM2
BH CV ECHO MEAS - EDV(CUBED): 166.4 ML
BH CV ECHO MEAS - EDV(MOD-SP2): 71.7 ML
BH CV ECHO MEAS - EDV(MOD-SP4): 87.3 ML
BH CV ECHO MEAS - EF(MOD-SP2): 49.4 %
BH CV ECHO MEAS - EF(MOD-SP4): 49.5 %
BH CV ECHO MEAS - ESV(CUBED): 50.7 ML
BH CV ECHO MEAS - ESV(MOD-SP2): 36.3 ML
BH CV ECHO MEAS - ESV(MOD-SP4): 44.1 ML
BH CV ECHO MEAS - FS: 32.7 %
BH CV ECHO MEAS - IVS/LVPW: 0.67 CM
BH CV ECHO MEAS - IVSD: 0.8 CM
BH CV ECHO MEAS - LA DIMENSION: 4.7 CM
BH CV ECHO MEAS - LV DIASTOLIC VOL/BSA (35-75): 49.2 CM2
BH CV ECHO MEAS - LV MASS(C)D: 213.2 GRAMS
BH CV ECHO MEAS - LV MAX PG: 3.7 MMHG
BH CV ECHO MEAS - LV MEAN PG: 2 MMHG
BH CV ECHO MEAS - LV SYSTOLIC VOL/BSA (12-30): 24.9 CM2
BH CV ECHO MEAS - LV V1 MAX: 96.3 CM/SEC
BH CV ECHO MEAS - LV V1 VTI: 15.3 CM
BH CV ECHO MEAS - LVIDD: 5.5 CM
BH CV ECHO MEAS - LVIDS: 3.7 CM
BH CV ECHO MEAS - LVOT AREA: 3.1 CM2
BH CV ECHO MEAS - LVOT DIAM: 2 CM
BH CV ECHO MEAS - LVPWD: 1.2 CM
BH CV ECHO MEAS - PA V2 MAX: 82 CM/SEC
BH CV ECHO MEAS - RAP SYSTOLE: 33 MMHG
BH CV ECHO MEAS - RVDD: 3.1 CM
BH CV ECHO MEAS - RVSP: 3 MMHG
BH CV ECHO MEAS - SV(LVOT): 48.1 ML
BH CV ECHO MEAS - SV(MOD-SP2): 35.4 ML
BH CV ECHO MEAS - SV(MOD-SP4): 43.2 ML
BH CV ECHO MEAS - SVI(LVOT): 27.1 ML/M2
BH CV ECHO MEAS - SVI(MOD-SP2): 20 ML/M2
BH CV ECHO MEAS - SVI(MOD-SP4): 24.4 ML/M2
BH CV ECHO MEAS - TAPSE (>1.6): 1.09 CM
BH CV ECHO MEAS - TR MAX PG: 30.3 MMHG
BH CV ECHO MEAS - TR MAX VEL: 275 CM/SEC
IVRT: 62 MS
LEFT ATRIUM VOLUME INDEX: 55.9 ML/M2
LV EF 2D ECHO EST: 50 %

## 2025-02-14 PROCEDURE — 93306 TTE W/DOPPLER COMPLETE: CPT

## 2025-02-19 ENCOUNTER — TELEPHONE (OUTPATIENT)
Dept: FAMILY MEDICINE CLINIC | Age: 75
End: 2025-02-19
Payer: MEDICARE

## 2025-02-19 NOTE — TELEPHONE ENCOUNTER
I received pt assistance form from the [facility] and the trulicty dose requested was 0.75 mg /0.5 ml , I called pt because per chart med list it says that she is supposed to be on 4.5 mg/0.5 ml . Pt said that she has always been on the 0.75 mg dose since she started and she said that she will not go any higher because he insurance will not pay . I called the [facility] clinic to see what doses that they have available if the provider wants to increase dose and they said they have 1.5 mg , 3 mg , 4.5 mg . I called pt and asked if she would be willing to increase to a higher dose because that was the intention and that she should be on the 4.5 mg ,and that her A1c has been above goal of 7.0 % for 2 years . She said that increasing to  the 1.5 mg dose would be ok. I called the [facility] clinic and left a message to return call .

## 2025-02-20 ENCOUNTER — TELEPHONE (OUTPATIENT)
Dept: CARDIOLOGY | Facility: CLINIC | Age: 75
End: 2025-02-20
Payer: MEDICARE

## 2025-02-20 NOTE — TELEPHONE ENCOUNTER
----- Message from Denice Yao sent at 2/20/2025 10:01 AM EST -----  Echocardiogram shows heart muscles are pumping well, there is some mild narrowing and leaking of her mitral valve, which we will continue to monitor with echocardiograms in the future.  Leaking was present on her previous echocardiogram as well.  At the time of echocardiogram it looks like she was in atrial fibrillation (not new) with an elevated heart rate, please check and see if she has noted what her heart rate and blood pressures have been running recently?  if they are on the high side I would like to increase metoprolol from 25 mg twice daily to 50 mg twice daily, however if her rate is fairly well-controlled or if her pressure is low I would rather she submit a log for the next week and then we can decide.

## 2025-02-20 NOTE — TELEPHONE ENCOUNTER
DARIA patient. Went over results. Patient states her BP has been up and down. Asked patient to keep BP log and call RN next week with results for possible medication adjustment to assist in controlling heart rate better. Patient verbalized understanding and appreciation.

## 2025-02-24 ENCOUNTER — TELEPHONE (OUTPATIENT)
Dept: FAMILY MEDICINE CLINIC | Age: 75
End: 2025-02-24
Payer: MEDICARE

## 2025-02-24 NOTE — TELEPHONE ENCOUNTER
Spoke with Olimpia at the [facility] clinic and they will get the paper work over to us I informed the pt to continue with the 0.75 mg until we get the paperwork completed

## 2025-02-24 NOTE — TELEPHONE ENCOUNTER
Caller: Zaria Howard    Relationship to patient: Self    Best call back number: [phone]     Patient is needing: PATIENT IS ASKING FOR QIAN TO CALL HER BACK ABOUT THE TRULICITY AND WHAT SHE IS TO DO

## 2025-02-24 NOTE — TELEPHONE ENCOUNTER
Patient called with BP/HR log. Patient noted she has only been taking her metoprolol once in am.     Patient bp/hr readings are:    Thursday: am: 120/  Friday : am: 114/; pm: 105/88  Saturday: am: 104/; pm: 112/  Sunday: am: 115/; pm: 108/    Patient reports she is not taking Eliquis. Within 3 days of starting Eliquis she was having nose bleeds and blood in her urine. Since stopping medication she was has not experienced any more.     Please advise

## 2025-02-24 NOTE — TELEPHONE ENCOUNTER
Her heart rate is not very well-controlled, recommend increasing dose of metoprolol to 25 mg twice daily.  Recommend she try restarting Eliquis at 2.5 mg twice daily and see if she is able to tolerate this without any bleeding.  Please have her submit a BP and heart rate log in 1 week.

## 2025-03-06 ENCOUNTER — READMISSION MANAGEMENT (OUTPATIENT)
Dept: CALL CENTER | Facility: HOSPITAL | Age: 75
End: 2025-03-06
Payer: MEDICARE

## 2025-03-06 NOTE — OUTREACH NOTE
Prep Survey      Flowsheet Row Responses   [facility] facility patient discharged from? Non-BH   Is LACE score < 7 ? Non-BH Discharge   Eligibility Not Eligible   What are the reasons patient is not eligible? Hospice/Pallative Care  [Discharge Disposition: Hospice/Medical Facility]   Does the patient have one of the following disease processes/diagnoses(primary or secondary)? Other   Prep survey completed? Yes            Esperanza BONILLA - Registered Nurse

## 2025-03-13 ENCOUNTER — TELEPHONE (OUTPATIENT)
Dept: FAMILY MEDICINE CLINIC | Age: 75
End: 2025-03-13
Payer: MEDICARE

## 2025-03-13 ENCOUNTER — READMISSION MANAGEMENT (OUTPATIENT)
Dept: CALL CENTER | Facility: HOSPITAL | Age: 75
End: 2025-03-13
Payer: MEDICARE

## 2025-03-13 NOTE — OUTREACH NOTE
Prep Survey      Flowsheet Row Responses   [facility] facility patient discharged from? Non-BH   Is LACE score < 7 ? Non- Discharge   Eligibility [facility]   Date of Discharge 03/12/25   Discharge Disposition Home or Self Care   Discharge diagnosis PNEUMONIA / AFIB   Does the patient have one of the following disease processes/diagnoses(primary or secondary)? Pneumonia   Prep survey completed? Yes            Caterina CANALES - Registered Nurse

## 2025-03-13 NOTE — TELEPHONE ENCOUNTER
Caller: Zaria Howard    Relationship to patient: Self    Best call back number: [phone]     New or established patient?  [] New  [x] Established    Date of discharge: 03/12/2025    Facility discharged from: [facility]    Diagnosis/Symptoms: PNEUMONIA / AFIB    Length of stay (If applicable): 6 DAYS

## 2025-03-14 ENCOUNTER — TRANSITIONAL CARE MANAGEMENT TELEPHONE ENCOUNTER (OUTPATIENT)
Dept: CALL CENTER | Facility: HOSPITAL | Age: 75
End: 2025-03-14
Payer: MEDICARE

## 2025-03-14 NOTE — OUTREACH NOTE
Call Center TCM Note      Flowsheet Row Responses   [facility] patient discharged from? Non-BH  [[facility]]   Does the patient have one of the following disease processes/diagnoses(primary or secondary)? Pneumonia   TCM attempt successful? Yes  [no names listed on verbal release]   Call start time 1013   Call end time 1018   Discharge diagnosis PNEUMONIA / AFIB/Ablation for Afib   Medication alerts for this patient ELIQUIS   Meds reviewed with patient/caregiver? Yes   Is the patient having any side effects they believe may be caused by any medication additions or changes? No   Does the patient have all medications ordered at discharge? Yes   Prescription comments benzonate added at d/c   Is the patient taking all medications as directed (includes completed medication regime)? Yes   Comments Hospital f/u 3/19/25@1100 (appt in place at time of call)   Does the patient have an appointment with their PCP within 7-14 days of discharge? Yes   Has home health visited the patient within 72 hours of discharge? N/A   Psychosocial issues? No   Did the patient receive a copy of their discharge instructions? Yes   Nursing interventions Reviewed instructions with patient  [this RN does not have AVS for review]   What is the patient's perception of their health status since discharge? Improving  [Pt reports she had an ablation for afib. Reviewed site care, activity, lifitng precautions with pt. No s/s bleeding at this time. Pt is walking hourly.  Denies any issues with afib, voices plans for watchman next month.  No questionsn.]   Is the patient/caregiver able to teach back signs and symptoms related to disease process for when to call PCP? Yes   Is the patient/caregiver able to teach back signs and symptoms related to disease process for when to call 911? Yes   TCM call completed? Yes   Call end time 1018            Ludy Amaral RN    3/14/2025, 10:21 EDT

## 2025-03-19 ENCOUNTER — OFFICE VISIT (OUTPATIENT)
Dept: FAMILY MEDICINE CLINIC | Age: 75
End: 2025-03-19
Payer: MEDICARE

## 2025-03-19 VITALS
HEART RATE: 63 BPM | HEIGHT: 63 IN | SYSTOLIC BLOOD PRESSURE: 124 MMHG | BODY MASS INDEX: 27.93 KG/M2 | WEIGHT: 157.6 LBS | DIASTOLIC BLOOD PRESSURE: 73 MMHG | OXYGEN SATURATION: 96 % | TEMPERATURE: 97.7 F

## 2025-03-19 DIAGNOSIS — I48.91 ATRIAL FIBRILLATION, UNSPECIFIED TYPE: Primary | ICD-10-CM

## 2025-03-19 DIAGNOSIS — R74.8 ELEVATED LIVER ENZYMES: ICD-10-CM

## 2025-03-19 DIAGNOSIS — R60.0 LOWER EXTREMITY EDEMA: ICD-10-CM

## 2025-03-19 DIAGNOSIS — J44.1 COPD WITH EXACERBATION: ICD-10-CM

## 2025-03-19 DIAGNOSIS — I50.33 ACUTE ON CHRONIC DIASTOLIC HF (HEART FAILURE): ICD-10-CM

## 2025-03-19 DIAGNOSIS — D72.829 LEUKOCYTOSIS, UNSPECIFIED TYPE: ICD-10-CM

## 2025-03-19 RX ORDER — METOPROLOL TARTRATE 25 MG/1
12.5 TABLET, FILM COATED ORAL 2 TIMES DAILY
COMMUNITY

## 2025-03-19 RX ORDER — MELATONIN 200 MCG
3 TABLET ORAL NIGHTLY PRN
COMMUNITY

## 2025-03-19 RX ORDER — MULTIVITAMIN WITH IRON
500 TABLET ORAL DAILY
COMMUNITY

## 2025-03-19 RX ORDER — DOCUSATE SODIUM 100 MG/1
100 CAPSULE, LIQUID FILLED ORAL DAILY PRN
COMMUNITY

## 2025-03-19 NOTE — PROGRESS NOTES
"Chief Complaint  Zaria Howard presents to [facility] for transition of care.      Zaria IS here today with family to follow up after a recent hospitalization.  The events that lead up to the hospitalization are as copied  from the H&P from the hospital admission :   \" Zaria Howard is a 74 y.o. female with history of hypertension, hyperlipidemia, type 2 diabetes, history of CAD, COPD, tobacco dependence, and newly diagnosed persistent atrial fibrillation which has been difficult to control. She was admitted on 3/4/25 with worsening shortness of breath due to poorly controlled persistent atrial fibrillation and acute on chronic HFpEF.  \"  She was then transferred to [facility] for management     Within 48 business hours after discharge our office contacted her via telephone to coordinate her care and needs.    Current outpatient and discharge medications have been reconciled for the patient.  Reviewed by: DOLORES Perdomo       Zaria was admitted to  [facility] (3/5/25 - 3/7/25) then transferred to [facility] on 3/7/2025 and was discharged on 3/12/2025 with a DIAGNOSIS  of  Afib, COPD acute on chronic HF and treated by Dr. Pederson, cardiology.      Her TREATMENT included heparinn gtt, metoprolol, afib ablation and loop recorder placed- to continue heparin and started on amiodarone during the hospital stay. Plan for Watchman in next month      -reviewed discharge summary? Yes  -reviewed medication changes and discussed those changes? Yes  Medication changes as listed: started metoprolol, amiodarone, eliquis   -reviewed and discussed hospital problems Yes  -reviewed and discussed abnormal values of inpatient lab results (only those noted in results review) Yes  -reviewed and discussed abnormal findings from inpatient diagnostic studies (only those noted in results review) Yes    Result Review   The following data was reviewed by: DOLORES Perdomo on 03/19/2025:  Labs " from [facility] care everywhere from 3/4/2025 and 3/5/2025   CXR from 3/4/2025  Additional discharge recommendations include:f/u in month for watchman placement . Planned for 4/16/2025     Zaria reports that her condition is improved since discharge.    Assessment and Plan     I have advised that Zaria keep all specialty appointments that have been recommended during the hospital stay to ensure adequate follow up and management of any conditions that require ongoing treatment and monitoring.      Diagnoses and all orders for this visit:    1. Atrial fibrillation, unspecified type (Primary)  Comments:  continue metoprolol, eliquis as recommended, follow up with cardiology as recommended    2. COPD with exacerbation    3. Acute on chronic diastolic HF (heart failure)  Comments:  continue to follow up with cardiology as scheduled    4. Lower extremity edema  Comments:  not on diuretic chronically, will follow up with cardiology for further recommendations, keep feet elevated    5. Leukocytosis, unspecified type  Comments:  repeat labs in 2-3 weeks - may be reactive from her procedure  no s/s infection today  Orders:  -     CBC & Differential; Future    6. Elevated liver enzymes  -     Comprehensive metabolic panel; Future        Follow Up   No follow-ups on file.  Follow up appts currently scheduled  include:    Future Appointments   Date Time Provider Department Center   5/21/2025 11:15 AM Jose Elias Calderon MD [facility]   5/28/2025  8:15 AM Karoline Harman APRN [facility]   9/17/2025  1:00 PM Jose Elias Calderon MD [facility]    .      Appointments that need to be made include:none, cardiology appt already made    No orders of the defined types were placed in this encounter.      Medications Discontinued During This Encounter   Medication Reason    apixaban (ELIQUIS) 5 MG tablet tablet Duplicate order    metoprolol succinate XL (TOPROL-XL) 25 MG 24 hr tablet Dose adjustment       Review of  "Systems    Objective     Vitals:    25 1030   BP: 124/73   BP Location: Left arm   Patient Position: Sitting   Cuff Size: Adult   Pulse: 63   Temp: 97.7 °F (36.5 °C)   TempSrc: Oral   SpO2: 96%   Weight: 71.5 kg (157 lb 9.6 oz)   Height: 160 cm (63\")     Body mass index is 27.92 kg/m².     Physical Exam  Constitutional:       General: She is not in acute distress.     Appearance: Normal appearance.   HENT:      Head: Normocephalic.   Cardiovascular:      Rate and Rhythm: Normal rate and regular rhythm.   Pulmonary:      Effort: Pulmonary effort is normal.      Breath sounds: Normal breath sounds.   Musculoskeletal:         General: Normal range of motion.      Right lower le+ Pitting Edema present.      Left lower le+ Pitting Edema present.   Skin:     Findings: Wound present.          Neurological:      General: No focal deficit present.      Mental Status: She is alert and oriented to person, place, and time.   Psychiatric:         Mood and Affect: Mood normal.         Behavior: Behavior normal.                        Allergies   Allergen Reactions    Diphenhydramine Other (See Comments)     NUMB IN FACE    Sudafed [Pseudoephedrine Hcl] Swelling      Past Medical History:   Diagnosis Date    Diabetes mellitus     Hyperlipidemia     Hypertension      Current Outpatient Medications   Medication Sig Dispense Refill    albuterol sulfate  (90 Base) MCG/ACT inhaler Inhale 1 puff.      apixaban (ELIQUIS) 5 MG tablet tablet Take 1 tablet by mouth 2 (Two) Times a Day. 60 tablet 0    aspirin 81 MG EC tablet Take 1 tablet by mouth Daily.      atorvastatin (LIPITOR) 10 MG tablet Take 1 tablet by mouth Daily. 90 tablet 3    docusate sodium (COLACE) 100 MG capsule Take 1 capsule by mouth Daily As Needed for Constipation.      Dulaglutide 4.5 MG/0.5ML solution auto-injector Inject 4.5 mg under the skin into the appropriate area as directed 1 (One) Time Per Week. MAXIMUM DOSE - DO NOT TAKE MORE THAN " PRESCRIBED 6 mL 1    hydroCHLOROthiazide 25 MG tablet Take 1 tablet by mouth Daily. 90 tablet 3    isosorbide mononitrate (IMDUR) 30 MG 24 hr tablet Take 1 tablet by mouth Daily. 90 tablet 3    losartan (Cozaar) 25 MG tablet Take 1 tablet by mouth Daily. 90 tablet 3    meclizine (ANTIVERT) 12.5 MG tablet TAKE 2 TABLETS BY ORAL ROUTE 1 HOUR BEFORE EXPOSURE TO MOTION 30 tablet 0    Melatonin 3 MG tablet Take 1 tablet by mouth At Night As Needed for Sleep.      metoprolol tartrate (LOPRESSOR) 25 MG tablet Take 0.5 tablets by mouth 2 (Two) Times a Day.      nitroglycerin (NITROSTAT) 0.4 MG SL tablet DISSOLVE 1 TABLET UNDER THE TONGUE EVERY 5 MINUTES AS NEEDED FOR CHEST PAIN. DO NOT EXCEED A TOTAL OF 3 DOSES IN 15 MINUTES. IF NO RELIEF, CALL 911 25 tablet 2    ondansetron (ZOFRAN) 4 MG tablet Take 1 tablet by mouth.      vitamin B-12 (CYANOCOBALAMIN) 500 MCG tablet Take 1 tablet by mouth Daily.      vitamin D3 125 MCG (5000 UT) capsule capsule Take 1 capsule by mouth Daily.       No current facility-administered medications for this visit.     Past Surgical History:   Procedure Laterality Date    BLADDER SUSPENSION  2018    HYSTERECTOMY        Health Maintenance Due   Topic Date Due    ZOSTER VACCINE (1 of 2) Never done    LUNG CANCER SCREENING  11/03/2021    COVID-19 Vaccine (5 - 2024-25 season) 09/01/2024    ANNUAL WELLNESS VISIT  03/26/2025    URINE MICROALBUMIN-CREATININE RATIO (uACR)  03/26/2025      Immunization History   Administered Date(s) Administered    Arexvy (RSV, Adults 60+ yrs) 11/08/2024    COVID-19 (MODERNA) 1st,2nd,3rd Dose Monovalent 03/04/2021, 04/01/2021, 10/25/2021    COVID-19 (UNSPECIFIED) 07/07/2022    TD Preservative Free (Tenivac) 07/20/2005         Part of this note may be an electronic transcription/translation of spoken language to printed   text using the Dragon Dictation System.      DOLORES Perdomo

## 2025-04-22 ENCOUNTER — TELEPHONE (OUTPATIENT)
Dept: FAMILY MEDICINE CLINIC | Age: 75
End: 2025-04-22
Payer: MEDICARE

## 2025-05-02 ENCOUNTER — TELEPHONE (OUTPATIENT)
Age: 75
End: 2025-05-02
Payer: MEDICARE

## 2025-05-02 NOTE — TELEPHONE ENCOUNTER
PT voiced understanding she had lab orders that has never been drawn, reminded her of her appt 5/22/25

## 2025-05-06 ENCOUNTER — LAB (OUTPATIENT)
Dept: LAB | Facility: HOSPITAL | Age: 75
End: 2025-05-06
Payer: MEDICARE

## 2025-05-06 DIAGNOSIS — I48.91 ATRIAL FIBRILLATION, UNSPECIFIED TYPE: ICD-10-CM

## 2025-05-06 DIAGNOSIS — R74.8 ELEVATED LIVER ENZYMES: ICD-10-CM

## 2025-05-06 DIAGNOSIS — D72.829 LEUKOCYTOSIS, UNSPECIFIED TYPE: ICD-10-CM

## 2025-05-06 LAB
BASOPHILS # BLD AUTO: 0.02 10*3/MM3 (ref 0–0.2)
BASOPHILS NFR BLD AUTO: 0.2 % (ref 0–1.5)
DEPRECATED RDW RBC AUTO: 46.6 FL (ref 37–54)
EOSINOPHIL # BLD AUTO: 0.31 10*3/MM3 (ref 0–0.4)
EOSINOPHIL NFR BLD AUTO: 3.1 % (ref 0.3–6.2)
ERYTHROCYTE [DISTWIDTH] IN BLOOD BY AUTOMATED COUNT: 14.5 % (ref 12.3–15.4)
HCT VFR BLD AUTO: 41.2 % (ref 34–46.6)
HGB BLD-MCNC: 13 G/DL (ref 12–15.9)
IMM GRANULOCYTES # BLD AUTO: 0.05 10*3/MM3 (ref 0–0.05)
IMM GRANULOCYTES NFR BLD AUTO: 0.5 % (ref 0–0.5)
LYMPHOCYTES # BLD AUTO: 2.94 10*3/MM3 (ref 0.7–3.1)
LYMPHOCYTES NFR BLD AUTO: 29.6 % (ref 19.6–45.3)
MCH RBC QN AUTO: 27 PG (ref 26.6–33)
MCHC RBC AUTO-ENTMCNC: 31.6 G/DL (ref 31.5–35.7)
MCV RBC AUTO: 85.7 FL (ref 79–97)
MONOCYTES # BLD AUTO: 0.65 10*3/MM3 (ref 0.1–0.9)
MONOCYTES NFR BLD AUTO: 6.6 % (ref 5–12)
NEUTROPHILS NFR BLD AUTO: 5.95 10*3/MM3 (ref 1.7–7)
NEUTROPHILS NFR BLD AUTO: 60 % (ref 42.7–76)
PLATELET # BLD AUTO: 258 10*3/MM3 (ref 140–450)
PMV BLD AUTO: 9.4 FL (ref 6–12)
RBC # BLD AUTO: 4.81 10*6/MM3 (ref 3.77–5.28)
WBC NRBC COR # BLD AUTO: 9.92 10*3/MM3 (ref 3.4–10.8)

## 2025-05-06 PROCEDURE — 36415 COLL VENOUS BLD VENIPUNCTURE: CPT

## 2025-05-06 PROCEDURE — 85025 COMPLETE CBC W/AUTO DIFF WBC: CPT

## 2025-05-06 PROCEDURE — 80053 COMPREHEN METABOLIC PANEL: CPT

## 2025-05-07 LAB
ALBUMIN SERPL-MCNC: 3.8 G/DL (ref 3.5–5.2)
ALBUMIN/GLOB SERPL: 1.4 G/DL
ALP SERPL-CCNC: 109 U/L (ref 39–117)
ALT SERPL W P-5'-P-CCNC: 14 U/L (ref 1–33)
ANION GAP SERPL CALCULATED.3IONS-SCNC: 13.2 MMOL/L (ref 5–15)
AST SERPL-CCNC: 13 U/L (ref 1–32)
BILIRUB SERPL-MCNC: <0.2 MG/DL (ref 0–1.2)
BUN SERPL-MCNC: 18 MG/DL (ref 8–23)
BUN/CREAT SERPL: 22 (ref 7–25)
CALCIUM SPEC-SCNC: 9.7 MG/DL (ref 8.6–10.5)
CHLORIDE SERPL-SCNC: 101 MMOL/L (ref 98–107)
CO2 SERPL-SCNC: 24.8 MMOL/L (ref 22–29)
CREAT SERPL-MCNC: 0.82 MG/DL (ref 0.57–1)
EGFRCR SERPLBLD CKD-EPI 2021: 75.2 ML/MIN/1.73
GLOBULIN UR ELPH-MCNC: 2.8 GM/DL
GLUCOSE SERPL-MCNC: 176 MG/DL (ref 65–99)
POTASSIUM SERPL-SCNC: 4 MMOL/L (ref 3.5–5.2)
PROT SERPL-MCNC: 6.6 G/DL (ref 6–8.5)
SODIUM SERPL-SCNC: 139 MMOL/L (ref 136–145)

## 2025-07-07 RX ORDER — MECLIZINE HCL 12.5 MG 12.5 MG/1
TABLET ORAL
Qty: 30 TABLET | Refills: 0 | Status: SHIPPED | OUTPATIENT
Start: 2025-07-07